# Patient Record
Sex: FEMALE | Race: WHITE | NOT HISPANIC OR LATINO | Employment: OTHER | URBAN - NONMETROPOLITAN AREA
[De-identification: names, ages, dates, MRNs, and addresses within clinical notes are randomized per-mention and may not be internally consistent; named-entity substitution may affect disease eponyms.]

---

## 2017-01-12 ENCOUNTER — OFFICE VISIT (OUTPATIENT)
Dept: NEUROLOGY | Facility: CLINIC | Age: 32
End: 2017-01-12

## 2017-01-12 VITALS
HEIGHT: 66 IN | DIASTOLIC BLOOD PRESSURE: 72 MMHG | OXYGEN SATURATION: 97 % | HEART RATE: 78 BPM | SYSTOLIC BLOOD PRESSURE: 112 MMHG

## 2017-01-12 DIAGNOSIS — E66.01 MORBID OBESITY DUE TO EXCESS CALORIES (HCC): ICD-10-CM

## 2017-01-12 DIAGNOSIS — M51.27 HERNIATED NUCLEUS PULPOSUS, L5-S1: ICD-10-CM

## 2017-01-12 DIAGNOSIS — M54.40 ACUTE LOW BACK PAIN WITH SCIATICA, SCIATICA LATERALITY UNSPECIFIED, UNSPECIFIED BACK PAIN LATERALITY: Primary | ICD-10-CM

## 2017-01-12 PROCEDURE — 99213 OFFICE O/P EST LOW 20 MIN: CPT | Performed by: PSYCHIATRY & NEUROLOGY

## 2017-01-12 RX ORDER — ARIPIPRAZOLE 20 MG/1
20 TABLET ORAL DAILY
COMMUNITY
End: 2017-06-08 | Stop reason: ALTCHOICE

## 2017-01-12 NOTE — MR AVS SNAPSHOT
Vicky Maxwell   1/12/2017 9:00 AM   Office Visit    Dept Phone:  [phone]   Encounter #:  [id number]    Provider:  Mariano Leon MD, FAAN   Department:  [facility] NEUROLOGY                Your Full Care Plan              Today's Medication Changes          These changes are accurate as of: 1/12/17  9:47 AM.  If you have any questions, ask your nurse or doctor.               Medication(s)that have changed:     ABILIFY 20 MG tablet   Generic drug:  ARIPiprazole   Take 20 mg by mouth Daily.   What changed:  Another medication with the same name was removed. Continue taking this medication, and follow the directions you see here.       lamoTRIgine 200 MG tablet   Commonly known as:  LaMICtal   Take 200 mg by mouth 2 (Two) Times a Day. 2 TABS BID   What changed:  Another medication with the same name was removed. Continue taking this medication, and follow the directions you see here.         Stop taking medication(s)listed here:     hydrOXYzine 25 MG tablet   Commonly known as:  ATARAX                      Your Updated Medication List          This list is accurate as of: 1/12/17  9:47 AM.  Always use your most recent med list.                ABILIFY 20 MG tablet   Generic drug:  ARIPiprazole       clonazePAM 0.5 MG tablet   Commonly known as:  KlonoPIN       ibuprofen 800 MG tablet   Commonly known as:  ADVIL,MOTRIN       lamoTRIgine 200 MG tablet   Commonly known as:  LaMICtal       VENTOLIN  (90 BASE) MCG/ACT inhaler   Generic drug:  albuterol               You Were Diagnosed With        Codes Comments    Acute low back pain with sciatica, sciatica laterality unspecified, unspecified back pain laterality    -  Primary ICD-10-CM: M54.40  ICD-9-CM: 724.2, 724.3       Instructions     None    Patient Instructions History      Upcoming Appointments     Visit Type Date Time Department    FOLLOW UP 1/12/2017  9:00 AM [facility] [place] [place]    FOLLOW UP 4/13/2017   "9:30 AM [facility]     Our records indicate that you have declined [facility] sign[facility]. If you would like to sign up for [email or web address], please email [email or web address] or call [phone] to obtain an activation code.             Other Info from Your Visit           Your Appointments     Apr 13, 2017  9:30 AM EDT   Follow Up with Mariano Leon MD, FAAN   [facility] NEUROLOGY (--)    [address], [address]  [facility] [address] [phone]           Arrive 15 minutes prior to appointment.              Allergies     No Known Allergies      Reason for Visit     Follow-up MRI. Dr Leon requested pt be seen sooner to review results      Vital Signs     Blood Pressure Pulse Height Oxygen Saturation Smoking Status       112/72 (BP Location: Right arm, Patient Position: Sitting) 78 66\" (167.6 cm) 97% Never Smoker       Problems and Diagnoses Noted     Acute low back pain with sciatica, sciatica laterality unspecified, unspecified back pain laterality    -  Primary        "

## 2017-01-13 NOTE — PROGRESS NOTES
[facility] NEUROLOGY [place] PROGRESS NOTE  History of Present Illness     Date: 2/19/2017    Patient Identification  Vicky Maxwell is a 32 y.o. female.    Patient information was obtained from patient.  History/Exam limitations: none.    Original consultation requested by: Aria JUÁREZ      Chief Complaint   Follow-up (MRI. Dr Leon requested pt be seen sooner to review results)      History of Present Illness    Vicky is a delightful 32-year-old referred to neurology clinic for evaluation of right foot numbness involving the last 2 digits of her right foot.  Her symptoms has been going on for the last 6 months.   she has tried conservative nonsteroidal anti-inflammatory drugs without significant improvement .    Interval history since last visit , and underwent MRI of the lumbar sacral spine.   I personally reviewed the MRI scan with the patient.   MRI scan show multilevel degenerative changes with significant broad based disc bulging at L5-S1 giving rise to central canal stenosis and bilateral foraminal narrowing .     In today's visit , after reviewing the MRI with the patient we provide opportunity for the patient asked questions we have also discussed about treatment options including physical therapy and neurosurgical intervention .    PMH:   Past Medical History   Diagnosis Date   • Anxiety    • Asthma    • Bipolar disorder    • Depression    • Mitral valve prolapse    • Tattoo        Past Surgical History:   Past Surgical History   Procedure Laterality Date   • Fibula fracture surgery  2014, 2015   • Mandible fracture surgery  2002       Family Hisotry:   Family History   Problem Relation Age of Onset   • Breast cancer Mother    • Colon cancer Neg Hx        Social History:   Social History     Social History   • Marital status:      Spouse name: N/A   • Number of children: N/A   • Years of education: N/A     Occupational History   • Not on file.     Social History Main Topics   • Smoking  status: Never Smoker   • Smokeless tobacco: Never Used   • Alcohol use 0.6 oz/week     1 Glasses of wine per week      Comment: social   • Drug use: No   • Sexual activity: Not on file     Other Topics Concern   • Not on file     Social History Narrative       Medications:   Current Outpatient Prescriptions   Medication Sig Dispense Refill   • ARIPiprazole (ABILIFY) 20 MG tablet Take 20 mg by mouth Daily.     • clonazePAM (KlonoPIN) 0.5 MG tablet TAKE 1 TABLET TWICE A DAY  0   • ibuprofen (ADVIL,MOTRIN) 800 MG tablet 3 (Three) Times a Day As Needed.  1   • lamoTRIgine (LaMICtal) 200 MG tablet Take 200 mg by mouth 2 (Two) Times a Day. 2 TABS BID  1   • VENTOLIN  (90 BASE) MCG/ACT inhaler INHALE 2 PUFF BY INHALATION ROUTE EVERY 4 - 6 HOURS AS NEEDED  0     No current facility-administered medications for this visit.        Allergy: No Known Allergies    Review of Systems:  Review of Systems   Constitutional: Negative for chills and fever.   HENT: Negative for congestion, ear pain, hearing loss, rhinorrhea and sore throat.    Eyes: Negative for pain, discharge and redness.   Respiratory: Negative for cough, shortness of breath, wheezing and stridor.    Cardiovascular: Negative for chest pain, palpitations and leg swelling.   Gastrointestinal: Negative for abdominal pain, constipation, nausea and vomiting.   Endocrine: Negative for cold intolerance, heat intolerance and polyphagia.   Genitourinary: Negative for dysuria, flank pain, frequency and urgency.   Musculoskeletal: Positive for arthralgias, back pain and gait problem. Negative for joint swelling, myalgias, neck pain and neck stiffness.   Skin: Negative for pallor, rash and wound.   Allergic/Immunologic: Negative for environmental allergies.   Neurological: Positive for numbness. Negative for dizziness, tremors, seizures, syncope, facial asymmetry, speech difficulty, weakness, light-headedness and headaches.   Hematological: Negative for adenopathy.  "  Psychiatric/Behavioral: Negative for confusion and hallucinations. The patient is not nervous/anxious.        Physical Exam     Vitals:    01/12/17 0912   BP: 112/72   BP Location: Right arm   Patient Position: Sitting   Pulse: 78   SpO2: 97%   Weight: Comment: scale could not register   Height: 66\" (167.6 cm)     GENERAL: Patient is pleasant, cooperative, appears to be stated age.  Body habitus is endomorphic.  SKIN AND EXTREMITIES:  No skin rashes or lesions are noted.  No cyanosis, clubbing or edema of the extremities.    HEAD:  Head is normocephalic and atraumatic.    NECK: Neck are non-tender without thyromegaly or adenopathy.  Carotic upstrokes are 1+/4.  No cranial or cervical bruits.  The neck is supple with a full range of motion.   ENT: palate elevate symmetrically, no evidence of high arch palate, tongue midline erythema in posterior pharynx, Mallampati Classification Class III   CARDIOVASCULAR:  Regular rate and rhythm with normal S1 and S2 without rub or gallop.  RESPIRATORY:  Clear to auscultation without wheezes or crackle   ABDOMEN:  Soft and non-tender, positive bowel sound without hepatosplenomegaly  BACK:  Back is straight without midline defect.    PSYCH:  Higher cortical function/mental status:  The patient is alert.  She is oriented x3 to time, place and person.  Recent and the remote memory appear normal.  The patient has a good fund of knowledge.  There is no visual or auditory hallucination or suicidal or homicidal ideation.  SPEECH:There is no gross evidence of aphasia, dysarthria or agnosia.      CRANIAL NERVES:  Pupils are 4mm, equal round reactive to light, reacting briskly to 2mm without afferent pupillary defect.  Visual fields are intact to confrontation testing.  Fundoscopic examination reveals sharp disk margins with normal vasculature.  No papilledema, hemorrhages or exudates.  Extraocular movements are full and smooth with normal pursuits and saccades.  No nystagmus noted.  The " face is symmetric. palate elevate symmetrically, Tongue midline, positive gag reflex. The remainder of the cranial nerves are intact and symmetrical.    MOTOR: Strength is 5/5 throughout with normal tone and bulk with the following exceptions, 4/5 intrinsic muscles of the hands and feet.  No involuntary movements noted.    Deep Tendon Reflexes: are 1/4 and symmetrical in the upper extremities, 2/4 and symmetrical at the knees and 0/4 at the Achilles tendon.  Plantar responses were down-going bilaterally.    SENSATION:  Intact to pinprick, light touch, vibration and proprioception.  Decreased to light touch sensation and temperature sensation following S1 dermatome on her right foot.  Coordination:  The patient normally performs finger-nose-finger, heel-to-knee-to-shin and rapid alternating movements in symmetrical fashion.    COORDINATION AND GAIT:  The patient walks with a narrow-based gait.  Patient is able to heel-toe and tandem walk forward and backwards without difficulty.  Romberg and monopedal  Romberg are negative.    MUSCULOSKELETAL: Range of motion normal, no clubbing, cyanosis, or edema.  No joint swelling.            Review of Imaging: I have personally reviewed the following images and discussed with the patient.  MRI scan show multilevel degenerative changes with significant broad based disc bulging at L5-S1 giving rise to central canal stenosis and bilateral foraminal narrowing .     Records Reviewed: I have personally reviewed her previous medical record.    Vicky was seen today for follow-up.    Diagnoses and all orders for this visit:    Acute low back pain with sciatica, sciatica laterality unspecified, unspecified back pain laterality  -     Ambulatory Referral to Physical Therapy    Herniated nucleus pulposus, L5-S1    Morbid obesity due to excess calories      Treatments:  1.  We have discussed about the importance of weight reduction of her low back pain   2.  We have discussed about  [employer] exercises for her low back pain   3.  Patient is not familiar with [employer] exercises we will refer this patient to have physical therapy to evaluate and treat her low back pain   4.  We have discussed about surgical intervention the patient had failed conservative treatment with medication and physical therapy after 6 month trial .  5.  Counseled patient has follow   Chronic low back pain is a common problem in clinical practice. A history and physical examination should place patients into one of several categories: (1) nonspecific low back pain; (2) back pain associated with radiculopathy or spinal stenosis; (3) back pain referred from a nonspinal source; or (4) back pain associated with another specific spinal cause. For patients who have back pain associated with radiculopathy, spinal stenosis, or another specific spinal cause, magnetic resonance imaging or computed tomography may establish the diagnosis and guide management.  Acetaminophen and nonsteroidal anti-inflammatory drugs are first-line medications for chronic low back pain. Tramadol, opioids, and other adjunctive medications may benefit some patients who do not respond to nonsteroidal anti-inflammatory drugs. Acupuncture, exercise therapy, multidisciplinary rehabilitation programs, massage, behavior therapy, and spinal manipulation are effective in certain clinical situations. Patients with radicular symptoms may benefit from epidural steroid injections, but studies have produced mixed results. Most patients with chronic low back pain would not require surgical intervention. A surgical evaluation may be considered for select patients with functional disabilities or refractory pain despite multiple nonsurgical treatments.       This Document is signed by Mariano Leon MD, FAAN, FAASM    January 12, 2017

## 2017-02-08 ENCOUNTER — TREATMENT (OUTPATIENT)
Dept: PHYSICAL THERAPY | Facility: CLINIC | Age: 32
End: 2017-02-08

## 2017-02-08 DIAGNOSIS — M54.40 ACUTE LOW BACK PAIN WITH SCIATICA, SCIATICA LATERALITY UNSPECIFIED, UNSPECIFIED BACK PAIN LATERALITY: Primary | ICD-10-CM

## 2017-02-08 PROCEDURE — 97110 THERAPEUTIC EXERCISES: CPT | Performed by: PHYSICAL THERAPIST

## 2017-02-08 PROCEDURE — 97161 PT EVAL LOW COMPLEX 20 MIN: CPT | Performed by: PHYSICAL THERAPIST

## 2017-02-08 NOTE — PROGRESS NOTES
Physical Therapy Initial Evaluation and Plan of Care      Patient: Vicky Maxwell   : 1985  Diagnosis/ICD-10 Code:  Acute low back pain with sciatica, sciatica laterality unspecified, unspecified back pain laterality [M54.40]  Referring practitioner: Mariano Leon MD, FAAN    Subjective Evaluation    History of Present Illness  Date of onset: 2007  Mechanism of injury: Insidious onset of back pain and then R LE pain 5 years ago.  R LE pain to the toes and top of the foot.     Pain  Current pain ratin  At best pain ratin  At worst pain ratin  Location: R L/S primary area now.  R LE pain after sitting for long periods of time or walking long distances.   Quality: pressure and dull ache  Relieving factors: relaxation, rest and medications (laying on her side.  R side better. )  Aggravating factors: keyboarding (walking and standing)  Progression: worsening    Diagnostic Tests  MRI studies: abnormal (bulging disk)    Patient Goals  Patient goals for therapy: independence with ADLs/IADLs, decreased pain and increased motion  Patient goal: Walk for 30 minutes without increased pain.            Objective     Neurological Testing   Sensation     Lumbar     Right   Diminished: light touch    Comments   Right light touch: R lateral thigh    Active Range of Motion   Cervical/Thoracic Spine     Thoracic   Flexion: WFL and with pain  Extension: WFL and with pain  Left lateral flexion: WFL and with pain  Right lateral flexion: WFL    Lumbar   Flexion: WFL and with pain  Extension: WFL and with pain  Left lateral flexion: WFL and with pain  Right lateral flexion: WFL    Additional Active Range of Motion Details  Segmental limitations secondary to adipose tissue.     R hip flexion limited and groin pain noted passively and actively.     General Comments     Spine Comments  Supine Hooklying 3/10 initially,  Then complete relief for only 30 seconds.   Prone position 0/10 pain initially Then slow elevation  in pain after 3 minutes.     Sitting EOB trunk flexion + relief.          Assessment & Plan     Assessment  Impairments: impaired physical strength, lacks appropriate home exercise program, pain with function and weight-bearing intolerance  Assessment details: Patient is a 32 year old female who comes to physical therapy with chronic back pain. Signs and symptoms are consistent with lumbar strain/sprain resulting in pain, decreased ROM, decreased strength, and inability to perform all essential functional activities. Pt has some limitations secondary to obesity. Pt will benefit from skilled PT services to address the above issues.     Barriers to therapy: Obesity  Prognosis: fair  Prognosis details:      SHORT TERM GOALS:     2 weeks  1. Pt independent with HEP  2. Pt to demonstrate trunk AROM 50-75% of expected norms to allow for improved ability to perform ADL's  3. Pt to demonstrate bilateral hip strength 4/5 in all planes to improved stability of the core/trunk     LONG TERM GOALS:   6 weeks  1. Pt to demonstrate trunk AROM % of expected norms to allow for improved ability to perform functional activities  2. Pt to demonstrate ability to perform full functional squat with good form and without increased pain in the low back   3. Pt to report being able to work full shift or work in the home without increase in pain in the back  4. Pt to report cessation of pain/numbness/tingling into the right leg to show decreased nerve compression      Goals  Walk for 30 minutes without increased pain.     Plan  Therapy options: will be seen for skilled physical therapy services  Planned modality interventions: thermotherapy (hydrocollator packs) and cryotherapy  Planned therapy interventions: abdominal trunk stabilization, body mechanics training, flexibility, functional ROM exercises, home exercise program, joint mobilization, therapeutic activities, stretching, strengthening, spinal/joint mobilization and manual  therapy  Frequency: 1x week  Duration in weeks: 6  Treatment plan discussed with: patient        Manual Therapy:    0     mins  98151;  Therapeutic Exercise:    13     mins  95879;     Neuromuscular Tico:        mins  82992;    Therapeutic Activity:     3     mins  32952;     Gait Training:           mins  89725;     Ultrasound:          mins  33495;    Electrical Stimulation:         mins  84016 ( );  Dry Needling          mins self-pay    Timed Treatment:   16   mins   Total Treatment:     50   mins    PT SIGNATURE: Huber Camacho, PT   DATE TREATMENT INITIATED: 2/8/2017    Initial Certification  Certification Period: 5/9/2017  I certify that the therapy services are furnished while this patient is under my care.  The services outlined above are required by this patient, and will be reviewed every 90 days.     PHYSICIAN: Mariano Leon MD, FAAN      DATE:     Please sign and return via fax to  .. Thank you, [facility] Physical Therapy.

## 2017-02-15 ENCOUNTER — TREATMENT (OUTPATIENT)
Dept: PHYSICAL THERAPY | Facility: CLINIC | Age: 32
End: 2017-02-15

## 2017-02-15 DIAGNOSIS — M54.40 ACUTE LOW BACK PAIN WITH SCIATICA, SCIATICA LATERALITY UNSPECIFIED, UNSPECIFIED BACK PAIN LATERALITY: Primary | ICD-10-CM

## 2017-02-15 PROCEDURE — 97110 THERAPEUTIC EXERCISES: CPT | Performed by: PHYSICAL THERAPIST

## 2017-02-15 PROCEDURE — 97140 MANUAL THERAPY 1/> REGIONS: CPT | Performed by: PHYSICAL THERAPIST

## 2017-02-15 NOTE — PROGRESS NOTES
Physical Therapy Daily Progress Note        Vicky Hans reports: Pt reports feeling about the same.  Pain is in the same location. 6/10. Pt reports trunk flexion in sitting seems to help a little.          Subjective     Objective   Pt with no obvious distress at rest.  Pt able to reproduce sitting trunk flexion exercise with only minimal assistance.     Prone over T-ball 0/10 pain during activity.     Post PT 2/10 pain.  See Exercise, Manual, and Modality Logs for complete treatment.       Assessment/Plan  Pt with some relief from PT activity today.  Pts pain is fairly localized in the R Upper L/S today.     Progress per Plan of Care           Manual Therapy:    9     mins  21784;  Therapeutic Exercise:    44     mins  10797;     Neuromuscular Tico:        mins  27662;    Therapeutic Activity:          mins  51815;     Gait Training:           mins  33766;     Ultrasound:          mins  65874;    Electrical Stimulation:         mins  20351 ( );  Dry Needling          mins self-pay    Timed Treatment:   53   mins   Total Treatment:     56   mins    Huber Camacho, PT  Physical Therapist

## 2017-06-08 ENCOUNTER — OFFICE VISIT (OUTPATIENT)
Dept: NEUROLOGY | Facility: CLINIC | Age: 32
End: 2017-06-08

## 2017-06-08 VITALS
HEIGHT: 66 IN | SYSTOLIC BLOOD PRESSURE: 128 MMHG | DIASTOLIC BLOOD PRESSURE: 80 MMHG | OXYGEN SATURATION: 97 % | HEART RATE: 110 BPM | BODY MASS INDEX: 47.09 KG/M2 | WEIGHT: 293 LBS

## 2017-06-08 DIAGNOSIS — G89.29 CHRONIC LOW BACK PAIN WITH SCIATICA, SCIATICA LATERALITY UNSPECIFIED, UNSPECIFIED BACK PAIN LATERALITY: Primary | ICD-10-CM

## 2017-06-08 DIAGNOSIS — G57.11 MERALGIA PARAESTHETICA, RIGHT: ICD-10-CM

## 2017-06-08 DIAGNOSIS — M54.40 CHRONIC LOW BACK PAIN WITH SCIATICA, SCIATICA LATERALITY UNSPECIFIED, UNSPECIFIED BACK PAIN LATERALITY: Primary | ICD-10-CM

## 2017-06-08 PROCEDURE — 99213 OFFICE O/P EST LOW 20 MIN: CPT | Performed by: PSYCHIATRY & NEUROLOGY

## 2017-06-08 RX ORDER — RISPERIDONE 4 MG/1
1 TABLET ORAL DAILY
Refills: 1 | COMMUNITY
Start: 2017-05-17 | End: 2017-07-21

## 2017-06-08 RX ORDER — BUPROPION HYDROCHLORIDE 150 MG/1
300 TABLET ORAL
Refills: 0 | COMMUNITY
Start: 2017-05-17 | End: 2017-11-20 | Stop reason: DRUGHIGH

## 2017-06-08 NOTE — PROGRESS NOTES
[facility] NEUROLOGY [place] PROGRESS NOTE  History of Present Illness     Date: 6/8/2017    Patient Identification  Vicky Maxwell is a 32 y.o. female.    Patient information was obtained from patient.  History/Exam limitations: none.    Original consultation requested by: Aria JUÁREZ      Chief Complaint   Back Pain (After MRI pt was referred to PT. Pt states she had not been able to go to PT due to losing insurance, has been doing what she learned at home) and Numbness (Pt c/o numbness in Right thigh )      History of Present Illness   Patient is a pleasant 32-year-old lady presented office for follow-up.  Patient persistent back pain.  She underwent physical therapy for short period of time but when her insurance ran out she lost her physical therapist.  She is now complaining of having numbness radiating down to her right thigh but not yet on her right knee following the lateral femoral cutaneous nerve distribution.  Also reports to have difficulty with sleeping at nighttime because of the persistent pain.    PMH:   Past Medical History:   Diagnosis Date   • Anxiety    • Asthma    • Bipolar disorder    • Depression    • Mitral valve prolapse    • Tattoo        Past Surgical History:   Past Surgical History:   Procedure Laterality Date   • FIBULA FRACTURE SURGERY  2014, 2015   • MANDIBLE FRACTURE SURGERY  2002       Family Hisotry:   Family History   Problem Relation Age of Onset   • Breast cancer Mother    • Colon cancer Neg Hx        Social History:   Social History     Social History   • Marital status:      Spouse name: N/A   • Number of children: N/A   • Years of education: N/A     Occupational History   • Not on file.     Social History Main Topics   • Smoking status: Never Smoker   • Smokeless tobacco: Never Used   • Alcohol use 0.6 oz/week     1 Glasses of wine per week      Comment: social   • Drug use: No   • Sexual activity: Not on file     Other Topics Concern   • Not on file      Social History Narrative       Medications:   Current Outpatient Prescriptions   Medication Sig Dispense Refill   • buPROPion XL (WELLBUTRIN XL) 150 MG 24 hr tablet 300 mg.  0   • clonazePAM (KlonoPIN) 0.5 MG tablet TAKE 1 TABLET TWICE A DAY  0   • ibuprofen (ADVIL,MOTRIN) 800 MG tablet 3 (Three) Times a Day As Needed.  1   • lamoTRIgine (LaMICtal) 200 MG tablet Take 200 mg by mouth 2 (Two) Times a Day. 2 TABS BID  1   • risperiDONE (risperDAL) 4 MG tablet 1 tablet Daily.    1   • VENTOLIN  (90 BASE) MCG/ACT inhaler INHALE 2 PUFF BY INHALATION ROUTE EVERY 4 - 6 HOURS AS NEEDED  0     No current facility-administered medications for this visit.        Allergy: No Known Allergies    Review of Systems:  Review of Systems   Constitutional: Negative for chills and fever.   HENT: Negative for congestion, ear pain, hearing loss, rhinorrhea and sore throat.    Eyes: Negative for pain, discharge and redness.   Respiratory: Negative for cough, shortness of breath, wheezing and stridor.    Cardiovascular: Negative for chest pain, palpitations and leg swelling.   Gastrointestinal: Negative for abdominal pain, constipation, nausea and vomiting.   Endocrine: Negative for cold intolerance, heat intolerance and polyphagia.   Genitourinary: Negative for dysuria, flank pain, frequency and urgency.   Musculoskeletal: Positive for back pain and gait problem. Negative for joint swelling, myalgias, neck pain and neck stiffness.   Skin: Negative for pallor, rash and wound.   Allergic/Immunologic: Negative for environmental allergies.   Neurological: Positive for weakness and numbness. Negative for dizziness, tremors, seizures, syncope, facial asymmetry, speech difficulty, light-headedness and headaches.   Hematological: Negative for adenopathy.   Psychiatric/Behavioral: Positive for sleep disturbance. Negative for confusion and hallucinations. The patient is not nervous/anxious.        Physical Exam     Vitals:    06/08/17 1051  "  BP: 128/80   Pulse: 110   SpO2: 97%   Weight: (!) 400 lb 2 oz (181 kg)   Height: 66\" (167.6 cm)     GENERAL: Patient is pleasant, cooperative, appears to be stated age.  Body habitus is endomorphic.  SKIN AND EXTREMITIES:  No skin rashes or lesions are noted.  No cyanosis, clubbing or edema of the extremities.    HEAD:  Head is normocephalic and atraumatic.    NECK: Neck are non-tender without thyromegaly or adenopathy.  Carotic upstrokes are 1+/4.  No cranial or cervical bruits.  The neck is supple with a full range of motion.   ENT: palate elevate symmetrically, no evidence of high arch palate, tongue midline erythema in posterior pharynx, Mallampati Classification Class III   CARDIOVASCULAR:  Regular rate and rhythm with normal S1 and S2 without rub or gallop.  RESPIRATORY:  Clear to auscultation without wheezes or crackle   ABDOMEN:  Soft and non-tender, positive bowel sound without hepatosplenomegaly  BACK:  Back is straight without midline defect.    PSYCH:  Higher cortical function/mental status:  The patient is alert.  She is oriented x3 to time, place and person.  Recent and the remote memory appear normal.  The patient has a good fund of knowledge.  There is no visual or auditory hallucination or suicidal or homicidal ideation.  SPEECH:There is no gross evidence of aphasia, dysarthria or agnosia.      CRANIAL NERVES:  Pupils are 4mm, equal round reactive to light, reacting briskly to 2mm without afferent pupillary defect.  Visual fields are intact to confrontation testing.  Fundoscopic examination reveals sharp disk margins with normal vasculature.  No papilledema, hemorrhages or exudates.  Extraocular movements are full and smooth with normal pursuits and saccades.  No nystagmus noted.  The face is symmetric. palate elevate symmetrically, Tongue midline, positive gag reflex. The remainder of the cranial nerves are intact and symmetrical.    MOTOR: Strength is 5/5 throughout with normal tone and bulk " with the following exceptions, 4/5 intrinsic muscles of the hands and feet.  No involuntary movements noted.    Deep Tendon Reflexes: are 2/4 and symmetrical in the upper extremities, 2/4 and symmetrical at the knees and 1/4 and symmetrical at the Achilles tendon.  Plantar responses were down-going bilaterally.    SENSATION:  Intact to pinprick, light touch, vibration and proprioception.  Coordination:  The patient normally performs finger-nose-finger, heel-to-knee-to-shin and rapid alternating movements in symmetrical fashion.    COORDINATION AND GAIT:  The patient walks with a narrow-based gait.  Patient is able to heel-toe and tandem walk forward and backwards without difficulty.  Romberg and monopedal  Romberg are negative.    MUSCULOSKELETAL: Range of motion normal, no clubbing, cyanosis, or edema.  No joint swelling.            Studies: I have personally reviewed the following and discussed with the patient.  Results for orders placed or performed during the hospital encounter of 10/17/16   Sedimentation Rate   Result Value Ref Range    Sed Rate 20 0 - 20 mm/hr   Antinuclear Antibody With Reflex Cascade   Result Value Ref Range    COLLEEN Negative Negative    COLLEEN Comment .       Records Reviewed: I have personally reviewed her previous medical record.    Vicky was seen today for back pain and numbness.    Diagnoses and all orders for this visit:    Chronic low back pain with sciatica, sciatica laterality unspecified, unspecified back pain laterality  -     Inject Trigger Points, > 3    Meralgia paraesthetica, right        Treatments:  1.  We'll schedule patient to have a follow-up visit for trigger point injection  2.  We'll try to see if we can make another arrangement for her to see physical therapist.  3.  Continue muscle relaxant  4.  Continue anti-inflammatory medication  5.  Stretching exercise  6 counseled patient low back pain is follow  Chronic low back pain is a common problem in clinical practice. A  history and physical examination should place patients into one of several categories: (1) nonspecific low back pain; (2) back pain associated with radiculopathy or spinal stenosis; (3) back pain referred from a nonspinal source; or (4) back pain associated with another specific spinal cause. For patients who have back pain associated with radiculopathy, spinal stenosis, or another specific spinal cause, magnetic resonance imaging or computed tomography may establish the diagnosis and guide management.  Acetaminophen and nonsteroidal anti-inflammatory drugs are first-line medications for chronic low back pain. Tramadol, opioids, and other adjunctive medications may benefit some patients who do not respond to nonsteroidal anti-inflammatory drugs. Acupuncture, exercise therapy, multidisciplinary rehabilitation programs, massage, behavior therapy, and spinal manipulation are effective in certain clinical situations. Patients with radicular symptoms may benefit from epidural steroid injections, but studies have produced mixed results. Most patients with chronic low back pain would not require surgical intervention. A surgical evaluation may be considered for select patients with functional disabilities or refractory pain despite multiple nonsurgical treatments.   .    This Document is signed by Mariano Leon MD, FAAN, FAASM   June 8, 20179:48 PM

## 2017-07-21 ENCOUNTER — OFFICE VISIT (OUTPATIENT)
Dept: OBSTETRICS AND GYNECOLOGY | Facility: CLINIC | Age: 32
End: 2017-07-21

## 2017-07-21 VITALS
DIASTOLIC BLOOD PRESSURE: 70 MMHG | HEIGHT: 65 IN | BODY MASS INDEX: 48.82 KG/M2 | WEIGHT: 293 LBS | SYSTOLIC BLOOD PRESSURE: 132 MMHG

## 2017-07-21 DIAGNOSIS — N92.6 IRREGULAR MENSES: Primary | ICD-10-CM

## 2017-07-21 PROCEDURE — 99203 OFFICE O/P NEW LOW 30 MIN: CPT | Performed by: OBSTETRICS & GYNECOLOGY

## 2017-07-21 RX ORDER — ARIPIPRAZOLE 30 MG/1
TABLET ORAL DAILY
COMMUNITY
Start: 2017-07-19 | End: 2020-01-23

## 2017-07-21 NOTE — PATIENT INSTRUCTIONS
Abnormal Uterine Bleeding  Abnormal uterine bleeding means bleeding from the vagina that is not your normal menstrual period. This can be:  · Bleeding or spotting between periods.  · Bleeding after sex (sexual intercourse).  · Bleeding that is heavier or more than normal.  · Periods that last longer than usual.  · Bleeding after menopause.  There are many problems that may cause this. Treatment will depend on the cause of the bleeding. Any kind of bleeding that is not normal should be reviewed by your doctor.   HOME CARE  Watch your condition for any changes. These actions may lessen any discomfort you are having:  · Do not use tampons or douches as told by your doctor.  · Change your pads often.  You should get regular pelvic exams and Pap tests. Keep all appointments for tests as told by your doctor.  GET HELP IF:  · You are bleeding for more than 1 week.  · You feel dizzy at times.  GET HELP RIGHT AWAY IF:   · You pass out.  · You have to change pads every 15 to 30 minutes.  · You have belly pain.  · You have a fever.  · You become sweaty or weak.  · You are passing large blood clots from the vagina.  · You feel sick to your stomach (nauseous) and throw up (vomit).  MAKE SURE YOU:  · Understand these instructions.  · Will watch your condition.  · Will get help right away if you are not doing well or get worse.     This information is not intended to replace advice given to you by your health care provider. Make sure you discuss any questions you have with your health care provider.     Document Released: 10/15/2010 Document Revised: 12/23/2014 Document Reviewed: 07/17/2014  [employer] Patient Education ©2017 [employer]

## 2017-07-21 NOTE — PROGRESS NOTES
Subjective   Chief Complaint   Patient presents with   • Polycystic Ovary Syndrome      NEW PT   having alot of S&S of pcos   • Amenorrhea     2 months with no periods, [facility]     Vicky Maxwell is a 32 y.o. year old .  Patient's last menstrual period was 2017.  She presents to be seen because of amnoreha for 2m oths-was on REsperdal. Period usually regular proir to starting Resperdal--has been taken off of it. Sexually active--no OCP   LABs WNL-no DM, normal Thyroid  On psychotrpoics since --stefanyonw lsight elevation of PRL.--Anxiety/Dpressoin/Auditory hallucination--Mariano Walker MD    OTHER COMPLAINTS:  Nothing else    The following portions of the patient's history were reviewed and updated as appropriate:  She  has a past medical history of Anxiety; Anxiety; Asthma; Bipolar disorder; Depression; Mitral valve prolapse; and Tattoo.  She  does not have a problem list on file.  She  has a past surgical history that includes Fibula Fracture Surgery (, ) and Mandible fracture surgery ().  Her family history includes Breast cancer in her mother; Diabetes in her maternal grandfather. There is no history of Colon cancer.  She  reports that she has never smoked. She has never used smokeless tobacco. She reports that she drinks about 0.6 oz of alcohol per week  She reports that she does not use illicit drugs.  Current Outpatient Prescriptions   Medication Sig Dispense Refill   • ARIPiprazole (ABILIFY) 30 MG tablet      • buPROPion XL (WELLBUTRIN XL) 150 MG 24 hr tablet 300 mg.  0   • clonazePAM (KlonoPIN) 0.5 MG tablet TAKE 1 TABLET TWICE A DAY  0   • ibuprofen (ADVIL,MOTRIN) 800 MG tablet 3 (Three) Times a Day As Needed.  1   • lamoTRIgine (LaMICtal) 200 MG tablet Take 200 mg by mouth 2 (Two) Times a Day. 2 TABS BID  1   • VENTOLIN  (90 BASE) MCG/ACT inhaler INHALE 2 PUFF BY INHALATION ROUTE EVERY 4 - 6 HOURS AS NEEDED  0     No current  "facility-administered medications for this visit.      Current Outpatient Prescriptions on File Prior to Visit   Medication Sig   • buPROPion XL (WELLBUTRIN XL) 150 MG 24 hr tablet 300 mg.   • clonazePAM (KlonoPIN) 0.5 MG tablet TAKE 1 TABLET TWICE A DAY   • ibuprofen (ADVIL,MOTRIN) 800 MG tablet 3 (Three) Times a Day As Needed.   • lamoTRIgine (LaMICtal) 200 MG tablet Take 200 mg by mouth 2 (Two) Times a Day. 2 TABS BID   • VENTOLIN  (90 BASE) MCG/ACT inhaler INHALE 2 PUFF BY INHALATION ROUTE EVERY 4 - 6 HOURS AS NEEDED   • [DISCONTINUED] risperiDONE (risperDAL) 4 MG tablet 1 tablet Daily.       No current facility-administered medications on file prior to visit.      She has No Known Allergies.    Smoking status: Never Smoker                                                              Smokeless status: Never Used                        Review of Systems  Consitutional POS: nothing reported    NEG: anorexia or night sweats   Gastointestinal POS: nothing reported    NEG: bloating, change in bowel habits, melena or reflux symptoms   Genitourinary POS: nothing reported    NEG: dysuria or hematuria   Integument POS: nothing reported    NEG: moles that are changing in size, shape, color or rashes   Breast POS: nothing reported    NEG: persistent breast lump, skin dimpling or nipple discharge         Respiratory: negative  Cardiovascular: negative  Musculoskeletal:negative  Neurological: negative  Behavioral/Psych: positive for anxiety and depression          Objective   /70  Ht 65\" (165.1 cm)  Wt (!) 401 lb (182 kg)  LMP 05/01/2017  BMI 66.73 kg/m2    General:  well developed; well nourished  no acute distress  obese - Body mass index is 66.73 kg/(m^2).   Skin:  No suspicious lesions seen   Thyroid: normal to inspection and palpation   Lungs:  breathing is unlabored   Heart:  regular rate and rhythm, S1, S2 normal, no murmur, click, rub or gallop   Breasts:  Not performed.   Abdomen: soft, non-tender; " no masses  no umbilical or inginual hernias are present  no hepato-splenomegaly  Abnormal findings: morbid obesity   Pelvis: Not performed.     Lab Review   No data reviewed    Imaging   No data reviewed        Assessment   1. Irregular menstruation.  Patient is a long history of psychotropic use most recent prolactin was elevated over 100 though she denies galactorrhea.  She has super morbid obesity.  And infertility for 5+ years.  She is an open relationship with 2 partners.  1 of which has had a child.  She does use marijuana on occasion as well.  However preceding these last 2 skies cycles that she skipped she states her periods were regular and she had moliminal symptoms.     Plan   1. PCO labs have been ordered.  TVS in 3 weeks.       This note was electronically signed.      July 21, 2017

## 2017-07-22 LAB
DHEA-S SERPL-MCNC: 419.8 UG/DL (ref 84.8–378)
HBA1C MFR BLD: 5.3 %
PROLACTIN SERPL-MCNC: 26.8 NG/ML (ref 4.8–23.3)
TESTOST FREE SERPL-MCNC: 4 PG/ML (ref 0–4.2)

## 2017-07-26 ENCOUNTER — TRANSCRIBE ORDERS (OUTPATIENT)
Dept: MAMMOGRAPHY | Facility: HOSPITAL | Age: 32
End: 2017-07-26

## 2017-07-26 DIAGNOSIS — Z13.9 SCREENING: Primary | ICD-10-CM

## 2017-08-07 ENCOUNTER — PROCEDURE VISIT (OUTPATIENT)
Dept: NEUROLOGY | Facility: CLINIC | Age: 32
End: 2017-08-07

## 2017-08-07 VITALS
SYSTOLIC BLOOD PRESSURE: 128 MMHG | DIASTOLIC BLOOD PRESSURE: 70 MMHG | HEIGHT: 65 IN | BODY MASS INDEX: 48.82 KG/M2 | WEIGHT: 293 LBS | OXYGEN SATURATION: 95 % | HEART RATE: 101 BPM

## 2017-08-07 DIAGNOSIS — M54.40 BILATERAL LOW BACK PAIN WITH SCIATICA, SCIATICA LATERALITY UNSPECIFIED, UNSPECIFIED CHRONICITY: Primary | ICD-10-CM

## 2017-08-07 DIAGNOSIS — M79.18 MYOFASCIAL MUSCLE PAIN: ICD-10-CM

## 2017-08-07 PROCEDURE — 20553 NJX 1/MLT TRIGGER POINTS 3/>: CPT | Performed by: PSYCHIATRY & NEUROLOGY

## 2017-08-07 RX ORDER — METHYLPREDNISOLONE ACETATE 40 MG/ML
200 INJECTION, SUSPENSION INTRA-ARTICULAR; INTRALESIONAL; INTRAMUSCULAR; SOFT TISSUE ONCE
Status: COMPLETED | OUTPATIENT
Start: 2017-08-07 | End: 2017-08-07

## 2017-08-07 RX ORDER — BUPIVACAINE HYDROCHLORIDE 7.5 MG/ML
5 INJECTION, SOLUTION EPIDURAL; RETROBULBAR ONCE
Status: DISCONTINUED | OUTPATIENT
Start: 2017-08-07 | End: 2019-08-28

## 2017-08-07 RX ADMIN — METHYLPREDNISOLONE ACETATE 200 MG: 40 INJECTION, SUSPENSION INTRA-ARTICULAR; INTRALESIONAL; INTRAMUSCULAR; SOFT TISSUE at 19:30

## 2017-08-07 NOTE — PROGRESS NOTES
Procedure note    Procedure: Trigger point injection    Indication: Persistent low back pain and myofascial muscle pain.   patient underwent physical therapy for duration of time but her insurance ran out she lost her physical therapist.  Patient has tried ibuprofen without any benefit.  Patient subsequently presented here for trigger point injection.    Procedure note  Patient is suffering from headache and myofacial pain syndrome. Risks and benefits of the Trigger point injection procedure have been explained to the patient.  Patient has no contraindications such as bleed diathesis, recent acute trauma at the muscle sites, anesthetic allergy or anticoagulation.  Mechanism of trigger point injection has been explained to patient.     Procedure Note:  1.         Patient was positioned comfortably  2.         Before injections are started, 10 Trigger Points sites are identified throughout the bilateral upper trapezius muscle, levator scapulae, and erector spinae muscles.    3.         Overlying skin area was cleaned with Alcohol swab                                                                                                              4.         Before injection, trigger points sites were palpated for local twitch and referred pain to confirms placement                         5.         Local anesthetic was mixed with Depo-Medrol (5 cc of Marcaine 0.5% mixed with 5 cc of Depo-Medrol at 40 mg/ml - for a total of 10 cc)  6.         30 gauge ½ inch needle was utilized to ensure patient comfort          7.         I started with the most tender spot in above mentioned Trigger Points   1.   Localize most tender spot within taut muscle-fibers  2.   Fix tender spot between fingers (1-2 cm in size)   1.   Prevents from rolling away from needle  2.   Controls subcutaneous bleeding  3.   Before injection, patient was instructed of possible pain on injection  4.   Direct needle at 30 degree angle off skin   8.          Insert needle into skin 1-2 cm from Trigger Point   9.         Advance needle into Trigger Point   10.       Use 1cc anesthetic at each Trigger Points identified in step #2  11.       Redirect needle and reinject                                                                                              1.   Withdraw needle to subcutaneous tissue  2.   Redirect needle into adjacent tender areas  3.   Repeat until local twitch or tautness resolves  12.   After each of the 10 injections I held direct pressure at injection site for 2 minutes to prevents hematoma formation  13.   The same procedure was repeated for other paraspinous muscles in lumbar spine at L1 to L5 bilaterally  14.   Patient was instructed to gently stretch injected areas to full active range of motion in all directions and was instructed to repeat range of motion three times after injection  15.   Post-Procedure patient was instructed to avoid over-using injected area for 3-4 days   1.   Maintain active range of motion of injected muscle  2.   Patient applies ice to injected areas for a few hours  3.   Anticipate post-injection soreness for 3-4 days  There was no evidence of complications from injection was noted such as local Skin Infection  at injection site or local hematoma at injection site      Marcaine lot #[id number]  Expiration date January 1, 2019    Depo-Medrol lots number [id number]  Expiration date October 2019    Mariano Leon MD, FAAN  08/07/2017

## 2017-08-11 ENCOUNTER — HOSPITAL ENCOUNTER (OUTPATIENT)
Dept: MAMMOGRAPHY | Facility: HOSPITAL | Age: 32
Discharge: HOME | End: 2017-08-11

## 2017-08-11 DIAGNOSIS — Z13.9 SCREENING: ICD-10-CM

## 2017-10-17 ENCOUNTER — OFFICE VISIT (OUTPATIENT)
Dept: OBSTETRICS AND GYNECOLOGY | Facility: CLINIC | Age: 32
End: 2017-10-17

## 2017-10-17 VITALS
DIASTOLIC BLOOD PRESSURE: 70 MMHG | BODY MASS INDEX: 48.82 KG/M2 | HEIGHT: 65 IN | SYSTOLIC BLOOD PRESSURE: 141 MMHG | WEIGHT: 293 LBS

## 2017-10-17 DIAGNOSIS — Z31.69 INFERTILITY COUNSELING: Primary | ICD-10-CM

## 2017-10-17 PROCEDURE — 99213 OFFICE O/P EST LOW 20 MIN: CPT | Performed by: OBSTETRICS & GYNECOLOGY

## 2017-10-17 NOTE — PROGRESS NOTES
Subjective   Chief Complaint   Patient presents with   • Follow-up     TVS- irregular menses, infertility     Vicky Maxwell is a 32 y.o. year old .  No LMP recorded.  She presents to be seen because of AUB and infert. Labs shows mildly elevated DHEAS, PRL 26--dpown from over 100 since adjusting psychotropics. Since being seen has had 2 periods. TVS today WNL, ET 8mm, no masses, no FF    OTHER COMPLAINTS:  Nothing else    The following portions of the patient's history were reviewed and updated as appropriate:  She  has a past medical history of Anxiety; Anxiety; Asthma; Bipolar disorder; Depression; Mitral valve prolapse; and Tattoo.  She  does not have a problem list on file.  She  has a past surgical history that includes Fibula Fracture Surgery (, ) and Mandible fracture surgery ().  Her family history includes Breast cancer in her mother; Diabetes in her maternal grandfather. There is no history of Colon cancer.  She  reports that she has never smoked. She has never used smokeless tobacco. She reports that she drinks about 0.6 oz of alcohol per week  She reports that she does not use illicit drugs.  Current Outpatient Prescriptions   Medication Sig Dispense Refill   • ARIPiprazole (ABILIFY) 30 MG tablet      • buPROPion XL (WELLBUTRIN XL) 150 MG 24 hr tablet 300 mg.  0   • clonazePAM (KlonoPIN) 0.5 MG tablet TAKE 1 TABLET TWICE A DAY  0   • ibuprofen (ADVIL,MOTRIN) 800 MG tablet 3 (Three) Times a Day As Needed.  1   • lamoTRIgine (LaMICtal) 200 MG tablet Take 200 mg by mouth 2 (Two) Times a Day. 2 TABS BID  1   • VENTOLIN  (90 BASE) MCG/ACT inhaler INHALE 2 PUFF BY INHALATION ROUTE EVERY 4 - 6 HOURS AS NEEDED  0     Current Facility-Administered Medications   Medication Dose Route Frequency Provider Last Rate Last Dose   • bupivacaine PF (MARCAINE) 0.75 % injection 37.5 mg  5 mL Injection Once Mariano Leon MD, FAAN         Current Outpatient Prescriptions on File Prior to Visit  "  Medication Sig   • ARIPiprazole (ABILIFY) 30 MG tablet    • buPROPion XL (WELLBUTRIN XL) 150 MG 24 hr tablet 300 mg.   • clonazePAM (KlonoPIN) 0.5 MG tablet TAKE 1 TABLET TWICE A DAY   • ibuprofen (ADVIL,MOTRIN) 800 MG tablet 3 (Three) Times a Day As Needed.   • lamoTRIgine (LaMICtal) 200 MG tablet Take 200 mg by mouth 2 (Two) Times a Day. 2 TABS BID   • VENTOLIN  (90 BASE) MCG/ACT inhaler INHALE 2 PUFF BY INHALATION ROUTE EVERY 4 - 6 HOURS AS NEEDED     Current Facility-Administered Medications on File Prior to Visit   Medication   • bupivacaine PF (MARCAINE) 0.75 % injection 37.5 mg     She has No Known Allergies.    Smoking status: Never Smoker                                                              Smokeless status: Never Used                        Review of Systems  Consitutional POS: nothing reported    NEG: anorexia or night sweats   Gastointestinal POS: nothing reported    NEG: bloating, change in bowel habits, melena or reflux symptoms   Genitourinary POS: nothing reported    NEG: dysuria or hematuria   Integument POS: nothing reported    NEG: moles that are changing in size, shape, color or rashes   Breast POS: nothing reported    NEG: persistent breast lump, skin dimpling or nipple discharge         Pertinent items are noted in HPI.          Objective   /70  Ht 65\" (165.1 cm)  Wt (!) 400 lb (181 kg)  BMI 66.56 kg/m2    General:  well developed; well nourished  no acute distress   Skin:  No suspicious lesions seen   Thyroid: normal to inspection and palpation   Lungs:  breathing is unlabored  clear to auscultation bilaterally   Heart:  regular rate and rhythm, S1, S2 normal, no murmur, click, rub or gallop   Breasts:  Not performed.   Abdomen: Not performed.   Pelvis: Not performed.     Psychiatric: Alert and oriented ×3, mood and affect appropriate  HEENT: Atraumatic, normocephalic, normal scleral icterus  Extremities: 2+ pulses bilaterally, no edema      Lab Review   CMP, PRL and " TSH    Imaging   Pelvic ultrasound images independantly reviewed - as above        Assessment   1. Infertilty x 5     Plan   1. Semen analysis. Timed intercourse. OPK's  2. Day 21 progsterone if OPK's not success    No orders of the defined types were placed in this encounter.         This note was electronically signed.      October 17, 2017

## 2017-11-20 ENCOUNTER — PROCEDURE VISIT (OUTPATIENT)
Dept: NEUROLOGY | Facility: CLINIC | Age: 32
End: 2017-11-20

## 2017-11-20 VITALS
DIASTOLIC BLOOD PRESSURE: 68 MMHG | HEART RATE: 94 BPM | SYSTOLIC BLOOD PRESSURE: 126 MMHG | OXYGEN SATURATION: 96 % | HEIGHT: 65 IN | WEIGHT: 293 LBS | BODY MASS INDEX: 48.82 KG/M2

## 2017-11-20 DIAGNOSIS — M54.40 MIDLINE LOW BACK PAIN WITH SCIATICA, SCIATICA LATERALITY UNSPECIFIED, UNSPECIFIED CHRONICITY: Primary | ICD-10-CM

## 2017-11-20 DIAGNOSIS — M79.18 MYOFASCIAL MUSCLE PAIN: ICD-10-CM

## 2017-11-20 DIAGNOSIS — R26.9 GAIT DIFFICULTY: ICD-10-CM

## 2017-11-20 PROCEDURE — 20553 NJX 1/MLT TRIGGER POINTS 3/>: CPT | Performed by: PSYCHIATRY & NEUROLOGY

## 2017-11-20 RX ORDER — METHYLPREDNISOLONE ACETATE 40 MG/ML
200 INJECTION, SUSPENSION INTRA-ARTICULAR; INTRALESIONAL; INTRAMUSCULAR; SOFT TISSUE ONCE
Status: COMPLETED | OUTPATIENT
Start: 2017-11-20 | End: 2017-11-20

## 2017-11-20 RX ORDER — DICLOFENAC SODIUM 75 MG/1
1 TABLET, DELAYED RELEASE ORAL 2 TIMES DAILY
Refills: 0 | COMMUNITY
Start: 2017-11-13 | End: 2018-02-26

## 2017-11-20 RX ORDER — BUPROPION HYDROCHLORIDE 300 MG/1
1 TABLET ORAL DAILY
Refills: 2 | COMMUNITY
Start: 2017-10-04 | End: 2019-10-11

## 2017-11-20 RX ORDER — BUPIVACAINE HYDROCHLORIDE 7.5 MG/ML
5 INJECTION, SOLUTION EPIDURAL; RETROBULBAR ONCE
Status: DISCONTINUED | OUTPATIENT
Start: 2017-11-20 | End: 2019-08-28

## 2017-11-20 RX ADMIN — METHYLPREDNISOLONE ACETATE 200 MG: 40 INJECTION, SUSPENSION INTRA-ARTICULAR; INTRALESIONAL; INTRAMUSCULAR; SOFT TISSUE at 10:20

## 2017-11-20 NOTE — PROGRESS NOTES
Procedure note    Procedure: Trigger point injection    Indication: Patient has persistent bilateral low back pain.  Patient underwent physical therapy, patient has tried ibuprofen without significant improvement.  Patient benefited from trigger point injection in last visit.  Patient reported that the benefit wear off a week prior to this injection.  She had difficulty even tried to get off from the chair.    Procedure note  Patient is suffering from low back pain and myofacial pain syndrome. Risks and benefits of the Trigger point injection procedure have been explained to the patient.  Patient has no contraindications such as bleed diathesis, recent acute trauma at the muscle sites, anesthetic allergy or anticoagulation.  Mechanism of trigger point injection has been explained to patient.     Procedure Note:  1.         Patient was positioned comfortably  2.         Before injections are started, 10 Trigger Points sites are identified throughout the bilateral L1, L2, L3, L4, L5 paraspinals muscle bilaterally.    3.         Overlying skin area was cleaned with Alcohol swab                                                                                                              4.         Before injection, trigger points sites were palpated for local twitch and referred pain to confirms placement                         5.         Local anesthetic was mixed with Depo-Medrol (5 cc of Marcaine 0.5% mixed with 5 cc of Depo-Medrol at 40 mg/ml - for a total of 10 cc)  6.         30 gauge ½ inch needle was utilized to ensure patient comfort          7.         I started with the most tender spot in above mentioned Trigger Points   1.   Localize most tender spot within taut muscle-fibers  2.   Fix tender spot between fingers (1-2 cm in size)   1.   Prevents from rolling away from needle  2.   Controls subcutaneous bleeding  3.   Before injection, patient was instructed of possible pain on injection  4.   Direct needle  at 30 degree angle off skin   8.         Insert needle into skin 1-2 cm from Trigger Point   9.         Advance needle into Trigger Point   10.       Use 1cc anesthetic at each Trigger Points identified in step #2  11.       Redirect needle and reinject                                                                                              1.   Withdraw needle to subcutaneous tissue  2.   Redirect needle into adjacent tender areas  3.   Repeat until local twitch or tautness resolves  12.   After each of the 10 injections I held direct pressure at injection site for 2 minutes to prevents hematoma formation  13.   The same procedure was repeated for other Tender Points located in L1, L2, L3, L4, L5 paraspinous muscles on both sides  14.   Patient was instructed to gently stretch injected areas to full active range of motion in all directions and was instructed to repeat range of motion three times after injection  15.   Post-Procedure patient was instructed to avoid over-using injected area for 3-4 days   1.   Maintain active range of motion of injected muscle  2.   Patient applies ice to injected areas for a few hours  3.   Anticipate post-injection soreness for 3-4 days  There was no evidence of complications from injection was noted such as local Skin Infection  at injection site or local hematoma at injection site    Marcaine lot #[id number]  Expiration date February 1, 2019  NDC #0409-161 0-5 0    Depo-Medrol lots number [id number]  Expiration date April 2020  NDC #0009-028 0-02    Mariano Leon MD, FAAN  11/20/2017

## 2018-02-14 ENCOUNTER — TELEPHONE (OUTPATIENT)
Dept: NEUROLOGY | Facility: CLINIC | Age: 33
End: 2018-02-14

## 2018-02-14 ENCOUNTER — HOSPITAL ENCOUNTER (EMERGENCY)
Facility: HOSPITAL | Age: 33
Discharge: HOME | End: 2018-02-14
Attending: EMERGENCY MEDICINE | Admitting: EMERGENCY MEDICINE

## 2018-02-14 VITALS
HEART RATE: 112 BPM | TEMPERATURE: 98.7 F | DIASTOLIC BLOOD PRESSURE: 86 MMHG | OXYGEN SATURATION: 98 % | WEIGHT: 293 LBS | SYSTOLIC BLOOD PRESSURE: 129 MMHG | HEIGHT: 65 IN | BODY MASS INDEX: 48.82 KG/M2 | RESPIRATION RATE: 18 BRPM

## 2018-02-14 DIAGNOSIS — IMO0001 RADICULAR PAIN OF RIGHT LOWER BACK: Primary | ICD-10-CM

## 2018-02-14 PROCEDURE — 99282 EMERGENCY DEPT VISIT SF MDM: CPT

## 2018-02-14 RX ORDER — CYCLOBENZAPRINE HCL 10 MG
10 TABLET ORAL 2 TIMES DAILY PRN
Qty: 12 TABLET | Refills: 0 | Status: SHIPPED | OUTPATIENT
Start: 2018-02-14 | End: 2018-06-04

## 2018-02-14 NOTE — TELEPHONE ENCOUNTER
"Pt called stating that her back \"popped\" last night and since that happened she has been having pain in her legs and causing her difficulty to walk.      Please advise  "

## 2018-02-14 NOTE — DISCHARGE INSTRUCTIONS
You may take Tylenol 650mg every 6-8 hours as well as ibuprofen 600mg every 6-8 hours as needed for pain or fever.    Please take the prescribed flexeril only when your pain is not otherwise controlled with Tylenol or ibuprofen and when you do not have any dangerous activities to perform such as driving, as this can be dangerous.           Back Exercises  If you have pain in your back, do these exercises 2-3 times each day or as told by your doctor. When the pain goes away, do the exercises once each day, but repeat the steps more times for each exercise (do more repetitions). If you do not have pain in your back, do these exercises once each day or as told by your doctor.  Exercises  Single Knee to Chest     Do these steps 3-5 times in a row for each le. Lie on your back on a firm bed or the floor with your legs stretched out.  2. Bring one knee to your chest.  3. Hold your knee to your chest by grabbing your knee or thigh.  4. Pull on your knee until you feel a gentle stretch in your lower back.  5. Keep doing the stretch for 10-30 seconds.  6. Slowly let go of your leg and straighten it.  Pelvic Tilt     Do these steps 5-10 times in a row:  1. Lie on your back on a firm bed or the floor with your legs stretched out.  2. Bend your knees so they point up to the ceiling. Your feet should be flat on the floor.  3. Tighten your lower belly (abdomen) muscles to press your lower back against the floor. This will make your tailbone point up to the ceiling instead of pointing down to your feet or the floor.  4. Stay in this position for 5-10 seconds while you gently tighten your muscles and breathe evenly.  Cat-Cow     Do these steps until your lower back bends more easily:  1. Get on your hands and knees on a firm surface. Keep your hands under your shoulders, and keep your knees under your hips. You may put padding under your knees.  2. Let your head hang down, and make your tailbone point down to the floor so your  lower back is round like the back of a cat.  3. Stay in this position for 5 seconds.  4. Slowly lift your head and make your tailbone point up to the ceiling so your back hangs low (sags) like the back of a cow.  5. Stay in this position for 5 seconds.  Press-Ups     Do these steps 5-10 times in a row:  1. Lie on your belly (face-down) on the floor.  2. Place your hands near your head, about shoulder-width apart.  3. While you keep your back relaxed and keep your hips on the floor, slowly straighten your arms to raise the top half of your body and lift your shoulders. Do not use your back muscles. To make yourself more comfortable, you may change where you place your hands.  4. Stay in this position for 5 seconds.  5. Slowly return to lying flat on the floor.  Bridges     Do these steps 10 times in a row:  1. Lie on your back on a firm surface.  2. Bend your knees so they point up to the ceiling. Your feet should be flat on the floor.  3. Tighten your butt muscles and lift your butt off of the floor until your waist is almost as high as your knees. If you do not feel the muscles working in your butt and the back of your thighs, slide your feet 1-2 inches farther away from your butt.  4. Stay in this position for 3-5 seconds.  5. Slowly lower your butt to the floor, and let your butt muscles relax.  If this exercise is too easy, try doing it with your arms crossed over your chest.  Belly Crunches     Do these steps 5-10 times in a row:  1. Lie on your back on a firm bed or the floor with your legs stretched out.  2. Bend your knees so they point up to the ceiling. Your feet should be flat on the floor.  3. Cross your arms over your chest.  4. Tip your chin a little bit toward your chest but do not bend your neck.  5. Tighten your belly muscles and slowly raise your chest just enough to lift your shoulder blades a tiny bit off of the floor.  6. Slowly lower your chest and your head to the floor.  Back Lifts   Do these  steps 5-10 times in a row:  1. Lie on your belly (face-down) with your arms at your sides, and rest your forehead on the floor.  2. Tighten the muscles in your legs and your butt.  3. Slowly lift your chest off of the floor while you keep your hips on the floor. Keep the back of your head in line with the curve in your back. Look at the floor while you do this.  4. Stay in this position for 3-5 seconds.  5. Slowly lower your chest and your face to the floor.  Contact a doctor if:  · Your back pain gets a lot worse when you do an exercise.  · Your back pain does not lessen 2 hours after you exercise.  If you have any of these problems, stop doing the exercises. Do not do them again unless your doctor says it is okay.  Get help right away if:  · You have sudden, very bad back pain. If this happens, stop doing the exercises. Do not do them again unless your doctor says it is okay.  This information is not intended to replace advice given to you by your health care provider. Make sure you discuss any questions you have with your health care provider.  Document Released: 01/20/2012 Document Revised: 05/25/2017 Document Reviewed: 02/11/2016  Elsevier Interactive Patient Education © 2017 [employer]

## 2018-02-14 NOTE — ED PROVIDER NOTES
Subjective   History of Present Illness   33M with morbid obesity, chronic lower back pain presenting with right lower back pain radiating down the leg.  States that she was in bed 2 nights ago when she changes position and felt a pop in her lower back.  Since then she has had radicular pain from her right lower back to find her right knee.  Worse when she stands up or walks.  Better when she is reclining. Denies any weakness/numbness in legs, loss of control of bowels or bladder, fever, IVDA.       Review of Systems   Musculoskeletal: Positive for back pain.   All other systems reviewed and are negative.      Past Medical History:   Diagnosis Date   • Anxiety    • Anxiety    • Asthma    • Bipolar disorder    • Depression    • Mitral valve prolapse    • Tattoo        No Known Allergies    Past Surgical History:   Procedure Laterality Date   • FIBULA FRACTURE SURGERY  2014, 2015   • MANDIBLE FRACTURE SURGERY  2002       Family History   Problem Relation Age of Onset   • Breast cancer Mother    • Diabetes Maternal Grandfather    • Colon cancer Neg Hx        Social History     Social History   • Marital status:      Spouse name: N/A   • Number of children: N/A   • Years of education: N/A     Social History Main Topics   • Smoking status: Never Smoker   • Smokeless tobacco: Never Used   • Alcohol use 0.6 oz/week     1 Glasses of wine per week      Comment: social   • Drug use: No   • Sexual activity: Yes     Partners: Male     Birth control/ protection: None     Other Topics Concern   • None     Social History Narrative           Objective   Physical Exam   Constitutional: She is oriented to person, place, and time. She appears well-nourished. No distress.   HENT:   Head: Normocephalic.   Mouth/Throat: Oropharynx is clear and moist. No oropharyngeal exudate.   Eyes: No scleral icterus.   Neck: Neck supple. No tracheal deviation present.   Cardiovascular: Normal rate, regular rhythm, normal heart sounds and intact  distal pulses.  Exam reveals no gallop and no friction rub.    No murmur heard.  Pulmonary/Chest: Effort normal and breath sounds normal. No stridor. No respiratory distress. She has no wheezes. She has no rales.   Abdominal: Soft. She exhibits no distension and no mass. There is no tenderness. There is no rebound and no guarding.   Musculoskeletal: She exhibits tenderness (R SI joint. No midling back ttp). She exhibits no edema or deformity.   Neurological: She is alert and oriented to person, place, and time.   Strength and sensation intact to BLE     Skin: Skin is warm and dry. She is not diaphoretic. No erythema. No pallor.   Psychiatric: She has a normal mood and affect. Her behavior is normal.   Nursing note and vitals reviewed.      Procedures         ED Course  ED Course                  MDM  33-year-old female here with right lower back pain with radicular symptoms.  Afebrile, vital signs stable.  Likely sciatica versus herniated disc.  Discussed use of Tylenol, ibuprofen, will give a short prescription for Flexeril.  Discussed importance of follow-up with her primary care for further management.    Final diagnoses:   Radicular pain of right lower back            Navneet Napoles MD  02/14/18 [id number]

## 2018-02-15 NOTE — TELEPHONE ENCOUNTER
Pt was advised that pain like this could be due to injury that cannot be seen, especially after feeling a pop. Pt was advised to contact PCP or local ED and have this checked out immediately. Pt verbalized understanding

## 2018-02-19 ENCOUNTER — HOSPITAL ENCOUNTER (EMERGENCY)
Facility: HOSPITAL | Age: 33
Discharge: HOME | End: 2018-02-19
Attending: EMERGENCY MEDICINE | Admitting: EMERGENCY MEDICINE

## 2018-02-19 VITALS
HEART RATE: 97 BPM | TEMPERATURE: 98.9 F | HEIGHT: 65 IN | OXYGEN SATURATION: 97 % | WEIGHT: 293 LBS | DIASTOLIC BLOOD PRESSURE: 86 MMHG | BODY MASS INDEX: 48.82 KG/M2 | RESPIRATION RATE: 17 BRPM | SYSTOLIC BLOOD PRESSURE: 130 MMHG

## 2018-02-19 DIAGNOSIS — S91.312A LACERATION OF LEFT FOOT, INITIAL ENCOUNTER: Primary | ICD-10-CM

## 2018-02-19 DIAGNOSIS — M54.31 SCIATICA, RIGHT SIDE: ICD-10-CM

## 2018-02-19 PROCEDURE — 99283 EMERGENCY DEPT VISIT LOW MDM: CPT

## 2018-02-19 PROCEDURE — 63710000001 PREDNISONE PER 5 MG: Performed by: PHYSICIAN ASSISTANT

## 2018-02-19 PROCEDURE — 90715 TDAP VACCINE 7 YRS/> IM: CPT | Performed by: PHYSICIAN ASSISTANT

## 2018-02-19 PROCEDURE — 90471 IMMUNIZATION ADMIN: CPT | Performed by: PHYSICIAN ASSISTANT

## 2018-02-19 PROCEDURE — 25010000002 TDAP 5-2.5-18.5 LF-MCG/0.5 SUSPENSION: Performed by: PHYSICIAN ASSISTANT

## 2018-02-19 RX ORDER — PREDNISONE 20 MG/1
60 TABLET ORAL DAILY
Qty: 12 TABLET | Refills: 0 | Status: SHIPPED | OUTPATIENT
Start: 2018-02-19 | End: 2018-02-26

## 2018-02-19 RX ADMIN — PREDNISONE 60 MG: 50 TABLET ORAL at 18:35

## 2018-02-19 RX ADMIN — TETANUS TOXOID, REDUCED DIPHTHERIA TOXOID AND ACELLULAR PERTUSSIS VACCINE, ADSORBED 0.5 ML: 5; 2.5; 8; 8; 2.5 SUSPENSION INTRAMUSCULAR at 18:50

## 2018-02-19 NOTE — ED NOTES
"Pt reports \"hearing a pop\" about two weeks ago, states pain in R hip that radiates down right leg since then. Pt states ambulation and bending down to sit cause pain to worsen. Pt denies any numbness/tingling at this time.      Karen Mathews RN  02/19/18 1802    "

## 2018-02-20 NOTE — DISCHARGE INSTRUCTIONS
Take meds as directed.  Follow with Edward tomorrow.  Follow with priamry care.  Return to ER for worsening symptms.

## 2018-02-20 NOTE — ED PROVIDER NOTES
Subjective   HPI Comments: Patient is 33-year-old female who is here today complaining of right buttocks pain.  Patient states that 1-1/2-2 weeks ago she heard a pop in her back as she was changing positions, she then began to have pain in her central buttocks on the right side that radiates to her right calf.  She states it is radiating in nature begins at the buttocks.  Worse with walking movement, burning in nature.  She states she was seen here 4-5 days ago given ibuprofen and Flexeril without significant improvement in her symptoms.  Patient denies loss of bowel or bladder, weakness of the right lower extremity back pain.      History provided by:  Patient      Review of Systems   Musculoskeletal:        Pain right lower extremity   All other systems reviewed and are negative.      Past Medical History:   Diagnosis Date   • Anxiety    • Anxiety    • Asthma    • Bipolar disorder    • Depression    • Mitral valve prolapse    • Tattoo        No Known Allergies    Past Surgical History:   Procedure Laterality Date   • FIBULA FRACTURE SURGERY  2014, 2015   • MANDIBLE FRACTURE SURGERY  2002       Family History   Problem Relation Age of Onset   • Breast cancer Mother    • Diabetes Maternal Grandfather    • Colon cancer Neg Hx        Social History     Social History   • Marital status:      Spouse name: N/A   • Number of children: N/A   • Years of education: N/A     Social History Main Topics   • Smoking status: Never Smoker   • Smokeless tobacco: Never Used   • Alcohol use 0.6 oz/week     1 Glasses of wine per week      Comment: social   • Drug use: No   • Sexual activity: Yes     Partners: Male     Birth control/ protection: None     Other Topics Concern   • None     Social History Narrative           Objective   Physical Exam   Constitutional: She is oriented to person, place, and time. She appears well-developed and well-nourished.   Morbid obesity   HENT:   Head: Normocephalic and atraumatic.   Eyes: EOM  are normal.   Cardiovascular: Regular rhythm and normal heart sounds.    tachy   Pulmonary/Chest: Effort normal and breath sounds normal.   Abdominal: Soft. Bowel sounds are normal.   Musculoskeletal: Normal range of motion.     In the right buttocks cheek, positive straight leg raise right side.  Range of motion is normal   Neurological: She is alert and oriented to person, place, and time.   Sensation right lower extremity is normal, strength is symmetric bilateral lower extremities without deficit   Skin: Skin is warm. No rash noted. No erythema.   Psychiatric: She has a normal mood and affect. Her behavior is normal. Thought content normal.   Nursing note and vitals reviewed.      Procedures         ED Course  ED Course      Patient was seen here in the emergency department for similar symptoms 2-3 days prior.  I personally reviewed that note.  Patient continues to have radicular symptoms of the right lower extremity, it is most consistent with sciatica.  We will give patient a steroid burst.  Patient has a follow-up appointment with Dr. Leon Monday after next and she should follow with that appointment.  Return for any worsening symptoms.            MDM    Final diagnoses:   Laceration of left foot, initial encounter   Sciatica, right side            Rina Raymundo PA-C  02/19/18 [id number]

## 2018-02-26 ENCOUNTER — PROCEDURE VISIT (OUTPATIENT)
Dept: NEUROLOGY | Facility: CLINIC | Age: 33
End: 2018-02-26

## 2018-02-26 VITALS
SYSTOLIC BLOOD PRESSURE: 132 MMHG | HEIGHT: 65 IN | OXYGEN SATURATION: 98 % | HEART RATE: 122 BPM | WEIGHT: 293 LBS | BODY MASS INDEX: 48.82 KG/M2 | DIASTOLIC BLOOD PRESSURE: 78 MMHG

## 2018-02-26 DIAGNOSIS — M79.18 MYOFASCIAL MUSCLE PAIN: Primary | ICD-10-CM

## 2018-02-26 PROCEDURE — 20553 NJX 1/MLT TRIGGER POINTS 3/>: CPT | Performed by: PSYCHIATRY & NEUROLOGY

## 2018-02-26 RX ORDER — TOPIRAMATE 100 MG/1
TABLET, FILM COATED ORAL
Refills: 1 | COMMUNITY
Start: 2018-01-31 | End: 2019-10-11

## 2018-02-26 RX ORDER — BUPIVACAINE HYDROCHLORIDE 7.5 MG/ML
5 INJECTION, SOLUTION EPIDURAL; RETROBULBAR ONCE
Status: DISCONTINUED | OUTPATIENT
Start: 2018-02-26 | End: 2019-08-28

## 2018-02-26 RX ORDER — METHYLPREDNISOLONE ACETATE 40 MG/ML
200 INJECTION, SUSPENSION INTRA-ARTICULAR; INTRALESIONAL; INTRAMUSCULAR; SOFT TISSUE ONCE
Status: COMPLETED | OUTPATIENT
Start: 2018-02-26 | End: 2018-02-26

## 2018-02-26 RX ADMIN — METHYLPREDNISOLONE ACETATE 200 MG: 40 INJECTION, SUSPENSION INTRA-ARTICULAR; INTRALESIONAL; INTRAMUSCULAR; SOFT TISSUE at 20:06

## 2018-02-27 NOTE — PROGRESS NOTES
Procedure note    Procedure: Trigger point injection    Indication: Patient has persistent bilateral low back pain patient underwent physical therapy, ibuprofen without significant improvement patient reports that she only benefits from previous trigger point injection and last visit.  Patient reported that her low back pain recur up to 2-1/2 weeks of benefit.  Patient even have difficulty arising from chair.    Procedure note  Patient is suffering from headache and myofacial pain syndrome. Risks and benefits of the Trigger point injection procedure have been explained to the patient.  Patient has no contraindications such as bleed diathesis, recent acute trauma at the muscle sites, anesthetic allergy or anticoagulation.  Mechanism of trigger point injection has been explained to patient.     Procedure Note:  1.         Patient was positioned comfortably  2.         Before injections are started, 10 Trigger Points sites are identified throughout the bilateral upper trapezius muscle, levator scapulae, and erector spinae muscles.    3.         Overlying skin area was cleaned with Alcohol swab                                                                                                              4.         Before injection, trigger points sites were palpated for local twitch and referred pain to confirms placement                         5.         Local anesthetic was mixed with Depo-Medrol (5 cc of Marcaine 0.5% mixed with 5 cc of Depo-Medrol at 40 mg/ml - for a total of 10 cc)  6.         30 gauge ½ inch needle was utilized to ensure patient comfort          7.         I started with the most tender spot in above mentioned Trigger Points   1.   Localize most tender spot within taut muscle-fibers  2.   Fix tender spot between fingers (1-2 cm in size)   1.   Prevents from rolling away from needle  2.   Controls subcutaneous bleeding  3.   Before injection, patient was instructed of possible pain on injection  4.    Direct needle at 30 degree angle off skin   8.         Insert needle into skin 1-2 cm from Trigger Point   9.         Advance needle into Trigger Point   10.       Use 1cc anesthetic at each Trigger Points identified in step #2  11.       Redirect needle and reinject                                                                                              1.   Withdraw needle to subcutaneous tissue  2.   Redirect needle into adjacent tender areas  3.   Repeat until local twitch or tautness resolves  12.   After each of the 10 injections I held direct pressure at injection site for 2 minutes to prevents hematoma formation  13.   The same procedure was repeated for other paraspinous muscle in bilateral lumbar region L1, L2, L3, L4, L5 and bilateral hip  14.   Patient was instructed to gently stretch injected areas to full active range of motion in all directions and was instructed to repeat range of motion three times after injection  15.   Post-Procedure patient was instructed to avoid over-using injected area for 3-4 days   1.   Maintain active range of motion of injected muscle  2.   Patient applies ice to injected areas for a few hours  3.   Anticipate post-injection soreness for 3-4 days  There was no evidence of complications from injection was noted such as local Skin Infection  at injection site or local hematoma at injection site    Marcaine lot number [id number]  Expiration date October 2019  NDC #5515 0-1 6 9-1 0    Depo-Medrol NDC #0009-028 0-02  Expiration date July 2020  Lots number [id number]    Mariano Leon MD, FAAN  02/26/2018

## 2018-03-06 ENCOUNTER — APPOINTMENT (OUTPATIENT)
Dept: CT IMAGING | Facility: HOSPITAL | Age: 33
End: 2018-03-06

## 2018-03-06 ENCOUNTER — HOSPITAL ENCOUNTER (EMERGENCY)
Facility: HOSPITAL | Age: 33
Discharge: HOME | End: 2018-03-06
Attending: EMERGENCY MEDICINE | Admitting: EMERGENCY MEDICINE

## 2018-03-06 VITALS
TEMPERATURE: 98 F | SYSTOLIC BLOOD PRESSURE: 125 MMHG | OXYGEN SATURATION: 97 % | HEART RATE: 99 BPM | HEIGHT: 65 IN | BODY MASS INDEX: 48.82 KG/M2 | WEIGHT: 293 LBS | RESPIRATION RATE: 18 BRPM | DIASTOLIC BLOOD PRESSURE: 74 MMHG

## 2018-03-06 DIAGNOSIS — N39.0 URINARY TRACT INFECTION WITH HEMATURIA, SITE UNSPECIFIED: ICD-10-CM

## 2018-03-06 DIAGNOSIS — R31.9 URINARY TRACT INFECTION WITH HEMATURIA, SITE UNSPECIFIED: ICD-10-CM

## 2018-03-06 DIAGNOSIS — R10.9 LEFT FLANK PAIN: Primary | ICD-10-CM

## 2018-03-06 LAB
ALBUMIN SERPL-MCNC: 4.5 G/DL (ref 3.5–5)
ALBUMIN/GLOB SERPL: 1.5 G/DL (ref 1–2)
ALP SERPL-CCNC: 91 U/L (ref 38–126)
ALT SERPL W P-5'-P-CCNC: 34 U/L (ref 13–69)
AMORPH URATE CRY URNS QL MICRO: ABNORMAL /HPF
ANION GAP SERPL CALCULATED.3IONS-SCNC: 20.7 MMOL/L
AST SERPL-CCNC: 19 U/L (ref 15–46)
B-HCG UR QL: NEGATIVE
BACTERIA UR QL AUTO: ABNORMAL /HPF
BASOPHILS # BLD AUTO: 0.04 10*3/MM3 (ref 0–0.2)
BASOPHILS NFR BLD AUTO: 0.4 % (ref 0–2.5)
BILIRUB SERPL-MCNC: 0.5 MG/DL (ref 0.2–1.3)
BILIRUB UR QL STRIP: ABNORMAL
BUN BLD-MCNC: 21 MG/DL (ref 7–20)
BUN/CREAT SERPL: 16.2 (ref 7.1–23.5)
CALCIUM SPEC-SCNC: 9.8 MG/DL (ref 8.4–10.2)
CHLORIDE SERPL-SCNC: 104 MMOL/L (ref 98–107)
CLARITY UR: CLEAR
CO2 SERPL-SCNC: 21 MMOL/L (ref 26–30)
COD CRY URNS QL: ABNORMAL /HPF
COLOR UR: YELLOW
CREAT BLD-MCNC: 1.3 MG/DL (ref 0.6–1.3)
DEPRECATED RDW RBC AUTO: 49.1 FL (ref 37–54)
EOSINOPHIL # BLD AUTO: 0.03 10*3/MM3 (ref 0–0.7)
EOSINOPHIL NFR BLD AUTO: 0.3 % (ref 0–7)
ERYTHROCYTE [DISTWIDTH] IN BLOOD BY AUTOMATED COUNT: 13.6 % (ref 11.5–14.5)
GFR SERPL CREATININE-BSD FRML MDRD: 47 ML/MIN/1.73
GLOBULIN UR ELPH-MCNC: 3 GM/DL
GLUCOSE BLD-MCNC: 119 MG/DL (ref 74–98)
GLUCOSE UR STRIP-MCNC: NEGATIVE MG/DL
HCT VFR BLD AUTO: 44.2 % (ref 37–47)
HGB BLD-MCNC: 13.8 G/DL (ref 12–16)
HGB UR QL STRIP.AUTO: ABNORMAL
HYALINE CASTS UR QL AUTO: ABNORMAL /LPF
IMM GRANULOCYTES # BLD: 0.04 10*3/MM3 (ref 0–0.06)
IMM GRANULOCYTES NFR BLD: 0.4 % (ref 0–0.6)
KETONES UR QL STRIP: NEGATIVE
LEUKOCYTE ESTERASE UR QL STRIP.AUTO: NEGATIVE
LIPASE SERPL-CCNC: 119 U/L (ref 23–300)
LYMPHOCYTES # BLD AUTO: 1.29 10*3/MM3 (ref 0.6–3.4)
LYMPHOCYTES NFR BLD AUTO: 13.9 % (ref 10–50)
MCH RBC QN AUTO: 30.5 PG (ref 27–31)
MCHC RBC AUTO-ENTMCNC: 31.2 G/DL (ref 30–37)
MCV RBC AUTO: 97.8 FL (ref 81–99)
MONOCYTES # BLD AUTO: 0.85 10*3/MM3 (ref 0–0.9)
MONOCYTES NFR BLD AUTO: 9.1 % (ref 0–12)
NEUTROPHILS # BLD AUTO: 7.04 10*3/MM3 (ref 2–6.9)
NEUTROPHILS NFR BLD AUTO: 75.9 % (ref 37–80)
NITRITE UR QL STRIP: NEGATIVE
NRBC BLD MANUAL-RTO: 0 /100 WBC (ref 0–0)
PH UR STRIP.AUTO: 5.5 [PH] (ref 5–8)
PLATELET # BLD AUTO: 232 10*3/MM3 (ref 130–400)
PMV BLD AUTO: 10.5 FL (ref 6–12)
POTASSIUM BLD-SCNC: 3.7 MMOL/L (ref 3.5–5.1)
PROT SERPL-MCNC: 7.5 G/DL (ref 6.3–8.2)
PROT UR QL STRIP: ABNORMAL
RBC # BLD AUTO: 4.52 10*6/MM3 (ref 4.2–5.4)
RBC # UR: ABNORMAL /HPF
REF LAB TEST METHOD: ABNORMAL
SODIUM BLD-SCNC: 142 MMOL/L (ref 137–145)
SP GR UR STRIP: >=1.03 (ref 1–1.03)
SQUAMOUS #/AREA URNS HPF: ABNORMAL /HPF
UROBILINOGEN UR QL STRIP: ABNORMAL
WBC NRBC COR # BLD: 9.29 10*3/MM3 (ref 4.8–10.8)
WBC UR QL AUTO: ABNORMAL /HPF

## 2018-03-06 PROCEDURE — 87086 URINE CULTURE/COLONY COUNT: CPT | Performed by: PHYSICIAN ASSISTANT

## 2018-03-06 PROCEDURE — 99283 EMERGENCY DEPT VISIT LOW MDM: CPT

## 2018-03-06 PROCEDURE — 81025 URINE PREGNANCY TEST: CPT | Performed by: PHYSICIAN ASSISTANT

## 2018-03-06 PROCEDURE — 96361 HYDRATE IV INFUSION ADD-ON: CPT

## 2018-03-06 PROCEDURE — 25010000002 ONDANSETRON PER 1 MG: Performed by: PHYSICIAN ASSISTANT

## 2018-03-06 PROCEDURE — 81001 URINALYSIS AUTO W/SCOPE: CPT | Performed by: PHYSICIAN ASSISTANT

## 2018-03-06 PROCEDURE — 96374 THER/PROPH/DIAG INJ IV PUSH: CPT

## 2018-03-06 PROCEDURE — 85025 COMPLETE CBC W/AUTO DIFF WBC: CPT | Performed by: PHYSICIAN ASSISTANT

## 2018-03-06 PROCEDURE — 80053 COMPREHEN METABOLIC PANEL: CPT | Performed by: PHYSICIAN ASSISTANT

## 2018-03-06 PROCEDURE — 83690 ASSAY OF LIPASE: CPT | Performed by: PHYSICIAN ASSISTANT

## 2018-03-06 PROCEDURE — 74176 CT ABD & PELVIS W/O CONTRAST: CPT

## 2018-03-06 PROCEDURE — 25010000002 KETOROLAC TROMETHAMINE PER 15 MG: Performed by: PHYSICIAN ASSISTANT

## 2018-03-06 PROCEDURE — 96375 TX/PRO/DX INJ NEW DRUG ADDON: CPT

## 2018-03-06 RX ORDER — ONDANSETRON 2 MG/ML
4 INJECTION INTRAMUSCULAR; INTRAVENOUS ONCE
Status: COMPLETED | OUTPATIENT
Start: 2018-03-06 | End: 2018-03-06

## 2018-03-06 RX ORDER — SODIUM CHLORIDE 0.9 % (FLUSH) 0.9 %
10 SYRINGE (ML) INJECTION AS NEEDED
Status: DISCONTINUED | OUTPATIENT
Start: 2018-03-06 | End: 2018-03-07 | Stop reason: HOSPADM

## 2018-03-06 RX ORDER — NAPROXEN 500 MG/1
500 TABLET ORAL 2 TIMES DAILY PRN
Qty: 14 TABLET | Refills: 0 | Status: SHIPPED | OUTPATIENT
Start: 2018-03-06 | End: 2019-10-11

## 2018-03-06 RX ORDER — KETOROLAC TROMETHAMINE 30 MG/ML
30 INJECTION, SOLUTION INTRAMUSCULAR; INTRAVENOUS ONCE
Status: COMPLETED | OUTPATIENT
Start: 2018-03-06 | End: 2018-03-06

## 2018-03-06 RX ORDER — CEPHALEXIN 250 MG/1
500 CAPSULE ORAL ONCE
Status: COMPLETED | OUTPATIENT
Start: 2018-03-06 | End: 2018-03-06

## 2018-03-06 RX ORDER — ONDANSETRON 4 MG/1
4 TABLET, ORALLY DISINTEGRATING ORAL EVERY 6 HOURS PRN
Qty: 12 TABLET | Refills: 0 | Status: SHIPPED | OUTPATIENT
Start: 2018-03-06 | End: 2018-06-04

## 2018-03-06 RX ORDER — PHENAZOPYRIDINE HYDROCHLORIDE 100 MG/1
200 TABLET, FILM COATED ORAL ONCE
Status: COMPLETED | OUTPATIENT
Start: 2018-03-06 | End: 2018-03-06

## 2018-03-06 RX ORDER — CEPHALEXIN 500 MG/1
500 CAPSULE ORAL 3 TIMES DAILY
Qty: 30 CAPSULE | Refills: 0 | Status: SHIPPED | OUTPATIENT
Start: 2018-03-06 | End: 2018-06-04

## 2018-03-06 RX ORDER — PHENAZOPYRIDINE HYDROCHLORIDE 200 MG/1
200 TABLET, FILM COATED ORAL 3 TIMES DAILY PRN
Qty: 5 TABLET | Refills: 0 | Status: SHIPPED | OUTPATIENT
Start: 2018-03-06 | End: 2018-06-04

## 2018-03-06 RX ADMIN — KETOROLAC TROMETHAMINE 30 MG: 30 INJECTION, SOLUTION INTRAMUSCULAR at 19:59

## 2018-03-06 RX ADMIN — PHENAZOPYRIDINE HYDROCHLORIDE 200 MG: 100 TABLET ORAL at 22:56

## 2018-03-06 RX ADMIN — SODIUM CHLORIDE 1000 ML: 9 INJECTION, SOLUTION INTRAVENOUS at 19:59

## 2018-03-06 RX ADMIN — ONDANSETRON 4 MG: 2 INJECTION INTRAMUSCULAR; INTRAVENOUS at 20:00

## 2018-03-06 RX ADMIN — CEPHALEXIN 500 MG: 250 CAPSULE ORAL at 22:57

## 2018-03-07 NOTE — DISCHARGE INSTRUCTIONS
Increase fluids, lots of cranberry juice.  Return to the ER for markedly worsening flank pain, high fevers, vomiting, new or worsening symptoms.    Follow-up with your doctor in 2-3 days for further workup, treatment and evaluation if not improving.

## 2018-03-07 NOTE — ED PROVIDER NOTES
Subjective   HPI Comments: 33-year-old female here with severe, sharp, stabbing pain in her left flank and lower back radiating through to her left lower quadrant and down into her left groin region since around 5:30 this evening, sudden onset.  No urinary complaints, vomiting or diarrhea.  She does have some nausea.  No history of kidney stones or kidney infections.  LMP 3 weeks ago.  Denies pregnancy.  Symptoms are progressively worsening.    Aggravating factors: None.  Alleviating factors: None.  Treatment prior to arrival: None.      History provided by:  Patient  History limited by:  Acuity of condition   used: No        Review of Systems   Constitutional: Negative.    HENT: Negative.    Eyes: Negative.    Respiratory: Negative.    Cardiovascular: Negative.  Negative for chest pain.   Gastrointestinal: Positive for abdominal pain and nausea.   Endocrine: Negative.    Genitourinary: Positive for flank pain. Negative for dysuria.   Skin: Negative.    Allergic/Immunologic: Negative.    Neurological: Negative.    Hematological: Negative.    Psychiatric/Behavioral: Negative.    All other systems reviewed and are negative.      Past Medical History:   Diagnosis Date   • Anxiety    • Anxiety    • Asthma    • Bipolar disorder    • Depression    • Mitral valve prolapse    • Tattoo        No Known Allergies    Past Surgical History:   Procedure Laterality Date   • FIBULA FRACTURE SURGERY  2014, 2015   • MANDIBLE FRACTURE SURGERY  2002       Family History   Problem Relation Age of Onset   • Breast cancer Mother    • Diabetes Maternal Grandfather    • Colon cancer Neg Hx        Social History     Social History   • Marital status:      Social History Main Topics   • Smoking status: Never Smoker   • Smokeless tobacco: Never Used   • Alcohol use 0.6 oz/week     1 Glasses of wine per week      Comment: social   • Drug use: No   • Sexual activity: Yes     Partners: Male     Birth control/  protection: None           Objective   Physical Exam   Constitutional: She is oriented to person, place, and time. She appears well-developed and well-nourished. No distress.   HENT:   Head: Normocephalic and atraumatic.   Right Ear: External ear normal.   Left Ear: External ear normal.   Eyes: EOM are normal. Pupils are equal, round, and reactive to light.   Neck: Normal range of motion. Neck supple.   Cardiovascular: Normal rate, regular rhythm and normal heart sounds.    Pulmonary/Chest: Effort normal and breath sounds normal. No stridor. She has no wheezes. She exhibits no tenderness.   Abdominal: Soft. She exhibits no distension. There is no tenderness. There is no rebound and no guarding.   Left CVA tenderness noted.   Musculoskeletal: Normal range of motion. She exhibits no edema.   Neurological: She is alert and oriented to person, place, and time.   Skin: Skin is warm and dry. No rash noted. She is not diaphoretic.   Psychiatric: She has a normal mood and affect. Her behavior is normal. Judgment and thought content normal.   Nursing note and vitals reviewed.      Procedures         ED Course  ED Course                  MDM  Number of Diagnoses or Management Options  Left flank pain: new and requires workup  Urinary tract infection with hematuria, site unspecified: new and requires workup     Amount and/or Complexity of Data Reviewed  Clinical lab tests: reviewed and ordered  Tests in the radiology section of CPT®: ordered and reviewed  Discuss the patient with other providers: yes    Risk of Complications, Morbidity, and/or Mortality  Presenting problems: moderate  Diagnostic procedures: low  Management options: moderate  General comments: Left flank pain with hematuria and bacteriuria.  No pyuria.  Negative CT scan for kidney stones.  We will treat for UTI as the cause of her left flank pain. Dr Leyva agrees.    Patient Progress  Patient progress: stable      Final diagnoses:   Left flank pain    Urinary tract infection with hematuria, site unspecified            Payal Biggs PA-C  03/06/18 [id number]

## 2018-03-08 LAB — BACTERIA SPEC AEROBE CULT: NORMAL

## 2018-03-20 ENCOUNTER — TRANSCRIBE ORDERS (OUTPATIENT)
Dept: LAB | Facility: HOSPITAL | Age: 33
End: 2018-03-20

## 2018-03-20 ENCOUNTER — LAB (OUTPATIENT)
Dept: LAB | Facility: HOSPITAL | Age: 33
End: 2018-03-20

## 2018-03-20 DIAGNOSIS — N39.9 DISEASE OF URINARY TRACT: Primary | ICD-10-CM

## 2018-03-20 DIAGNOSIS — R73.9 BLOOD GLUCOSE ELEVATED: ICD-10-CM

## 2018-03-20 DIAGNOSIS — N39.9 DISEASE OF URINARY TRACT: ICD-10-CM

## 2018-03-20 LAB
ANION GAP SERPL CALCULATED.3IONS-SCNC: 19.1 MMOL/L
BILIRUB UR QL STRIP: NEGATIVE
BUN BLD-MCNC: 17 MG/DL (ref 7–20)
BUN/CREAT SERPL: 14.2 (ref 7.1–23.5)
CALCIUM SPEC-SCNC: 9.7 MG/DL (ref 8.4–10.2)
CHLORIDE SERPL-SCNC: 103 MMOL/L (ref 98–107)
CLARITY UR: ABNORMAL
CO2 SERPL-SCNC: 27 MMOL/L (ref 26–30)
COLOR UR: YELLOW
CREAT BLD-MCNC: 1.2 MG/DL (ref 0.6–1.3)
GFR SERPL CREATININE-BSD FRML MDRD: 52 ML/MIN/1.73
GLUCOSE BLD-MCNC: 87 MG/DL (ref 74–98)
GLUCOSE UR STRIP-MCNC: NEGATIVE MG/DL
HBA1C MFR BLD: 5.2 % (ref 3–6)
HGB UR QL STRIP.AUTO: ABNORMAL
KETONES UR QL STRIP: NEGATIVE
LEUKOCYTE ESTERASE UR QL STRIP.AUTO: NEGATIVE
NITRITE UR QL STRIP: NEGATIVE
PH UR STRIP.AUTO: 6.5 [PH] (ref 5–8)
POTASSIUM BLD-SCNC: 4.1 MMOL/L (ref 3.5–5.1)
PROT UR QL STRIP: NEGATIVE
SODIUM BLD-SCNC: 145 MMOL/L (ref 137–145)
SP GR UR STRIP: 1.02 (ref 1–1.03)
UROBILINOGEN UR QL STRIP: ABNORMAL

## 2018-03-20 PROCEDURE — 81003 URINALYSIS AUTO W/O SCOPE: CPT

## 2018-03-20 PROCEDURE — 80048 BASIC METABOLIC PNL TOTAL CA: CPT

## 2018-03-20 PROCEDURE — 87086 URINE CULTURE/COLONY COUNT: CPT

## 2018-03-20 PROCEDURE — 36415 COLL VENOUS BLD VENIPUNCTURE: CPT

## 2018-03-20 PROCEDURE — 83036 HEMOGLOBIN GLYCOSYLATED A1C: CPT

## 2018-03-22 LAB — BACTERIA SPEC AEROBE CULT: NORMAL

## 2018-05-23 ENCOUNTER — TRANSCRIBE ORDERS (OUTPATIENT)
Dept: ADMINISTRATIVE | Facility: HOSPITAL | Age: 33
End: 2018-05-23

## 2018-05-23 ENCOUNTER — LAB (OUTPATIENT)
Dept: LAB | Facility: HOSPITAL | Age: 33
End: 2018-05-23

## 2018-05-23 DIAGNOSIS — N28.9 KIDNEY DYSFUNCTION: Primary | ICD-10-CM

## 2018-05-23 DIAGNOSIS — N28.9 KIDNEY DYSFUNCTION: ICD-10-CM

## 2018-05-23 LAB
ANION GAP SERPL CALCULATED.3IONS-SCNC: 15.3 MMOL/L (ref 10–20)
BUN BLD-MCNC: 18 MG/DL (ref 7–20)
BUN/CREAT SERPL: 15 (ref 7.1–23.5)
CALCIUM SPEC-SCNC: 9.6 MG/DL (ref 8.4–10.2)
CHLORIDE SERPL-SCNC: 104 MMOL/L (ref 98–107)
CO2 SERPL-SCNC: 27 MMOL/L (ref 26–30)
CREAT BLD-MCNC: 1.2 MG/DL (ref 0.6–1.3)
GFR SERPL CREATININE-BSD FRML MDRD: 52 ML/MIN/1.73
GLUCOSE BLD-MCNC: 84 MG/DL (ref 74–98)
POTASSIUM BLD-SCNC: 4.3 MMOL/L (ref 3.5–5.1)
SODIUM BLD-SCNC: 142 MMOL/L (ref 137–145)

## 2018-05-23 PROCEDURE — 36415 COLL VENOUS BLD VENIPUNCTURE: CPT

## 2018-05-23 PROCEDURE — 80048 BASIC METABOLIC PNL TOTAL CA: CPT

## 2018-06-04 ENCOUNTER — PROCEDURE VISIT (OUTPATIENT)
Dept: NEUROLOGY | Facility: CLINIC | Age: 33
End: 2018-06-04

## 2018-06-04 VITALS
BODY MASS INDEX: 48.82 KG/M2 | HEIGHT: 65 IN | HEART RATE: 96 BPM | SYSTOLIC BLOOD PRESSURE: 134 MMHG | WEIGHT: 293 LBS | DIASTOLIC BLOOD PRESSURE: 84 MMHG | OXYGEN SATURATION: 98 %

## 2018-06-04 DIAGNOSIS — M54.50 CHRONIC BILATERAL LOW BACK PAIN WITHOUT SCIATICA: Primary | ICD-10-CM

## 2018-06-04 DIAGNOSIS — G89.29 CHRONIC BILATERAL LOW BACK PAIN WITHOUT SCIATICA: Primary | ICD-10-CM

## 2018-06-04 PROCEDURE — 20553 NJX 1/MLT TRIGGER POINTS 3/>: CPT | Performed by: PSYCHIATRY & NEUROLOGY

## 2018-06-04 RX ORDER — METHYLPREDNISOLONE ACETATE 40 MG/ML
200 INJECTION, SUSPENSION INTRA-ARTICULAR; INTRALESIONAL; INTRAMUSCULAR; SOFT TISSUE ONCE
Status: COMPLETED | OUTPATIENT
Start: 2018-06-04 | End: 2018-06-04

## 2018-06-04 RX ORDER — BUPIVACAINE HYDROCHLORIDE 5 MG/ML
5 INJECTION, SOLUTION PERINEURAL ONCE
Status: DISCONTINUED | OUTPATIENT
Start: 2018-06-04 | End: 2019-08-28

## 2018-06-04 RX ORDER — LAMOTRIGINE 200 MG/1
300 TABLET ORAL 2 TIMES DAILY
COMMUNITY
End: 2019-10-11 | Stop reason: SDUPTHER

## 2018-06-04 RX ADMIN — METHYLPREDNISOLONE ACETATE 200 MG: 40 INJECTION, SUSPENSION INTRA-ARTICULAR; INTRALESIONAL; INTRAMUSCULAR; SOFT TISSUE at 21:12

## 2018-06-04 NOTE — PROGRESS NOTES
Procedure note    Procedure: Trigger point injection    Indication: Patient has persistent bilateral low back pain patient underwent physical therapy and ibuprofen without any improvement.  Patient has benefited from previous trigger point injection.  Patient reported that the benefits wear off a week prior to each injection.  When it wear off patient reported that she had difficulty even get off from the chair.    Procedure note  Patient is suffering from headache and myofacial pain syndrome. Risks and benefits of the Trigger point injection procedure have been explained to the patient.  Patient has no contraindications such as bleed diathesis, recent acute trauma at the muscle sites, anesthetic allergy or anticoagulation.  Mechanism of trigger point injection has been explained to patient.     Procedure Note:  1.         Patient was positioned comfortably  2.         Before injections are started, 10 Trigger Points sites are identified throughout the bilateral L1, L2 ,  L3, L4,  L5,   paraspinous muscle                                                                                                  4.         Before injection, trigger points sites were palpated for local twitch and referred pain to confirms placement                         5.         Local anesthetic was mixed with Depo-Medrol (5 cc of Marcaine 0.5% mixed with 5 cc of Depo-Medrol at 40 mg/ml - for a total of 10 cc)  6.         30 gauge ½ inch needle was utilized to ensure patient comfort          7.         I started with the most tender spot in above mentioned Trigger Points   1.   Localize most tender spot within taut muscle-fibers  2.   Fix tender spot between fingers (1-2 cm in size)   1.   Prevents from rolling away from needle  2.   Controls subcutaneous bleeding  3.   Before injection, patient was instructed of possible pain on injection  4.   Direct needle at 30 degree angle off skin   8.         Insert needle into skin 1-2 cm from  Trigger Point   9.         Advance needle into Trigger Point   10.       Use 1cc anesthetic at each Trigger Points identified in step #2  11.       Redirect needle and reinject                                                                                              1.   Withdraw needle to subcutaneous tissue  2.   Redirect needle into adjacent tender areas  3.   Repeat until local twitch or tautness resolves  12.   After each of the 10 injections I held direct pressure at injection site for 2 minutes to prevents hematoma formation  13.   The same procedure was repeated for other Tender Points located in the bilateral L1, L2 ,  L3, L4,  L5,   paraspinous muscle.                                                                                      14.   Patient was instructed to gently stretch injected areas to full active range of motion in all directions and was instructed to repeat range of motion three times after injection  15.   Post-Procedure patient was instructed to avoid over-using injected area for 3-4 days   1.   Maintain active range of motion of injected muscle  2.   Patient applies ice to injected areas for a few hours  3.   Anticipate post-injection soreness for 3-4 days  There was no evidence of complications from injection was noted such as local Skin Infection  at injection site or local hematoma at injection site    Marcaine  NDC #0409-116 0-1 8  Lots number [id number]  Expiration date January 1, 2020    Depo-Medrol  NDC #0009-028 0-02  Lots number [id number]  Expiration date July 2020    Mariano Leon MD, FAAN  06/04/2018

## 2018-06-05 ENCOUNTER — LAB (OUTPATIENT)
Dept: LAB | Facility: HOSPITAL | Age: 33
End: 2018-06-05
Attending: INTERNAL MEDICINE

## 2018-06-05 ENCOUNTER — TRANSCRIBE ORDERS (OUTPATIENT)
Dept: LAB | Facility: HOSPITAL | Age: 33
End: 2018-06-05

## 2018-06-05 DIAGNOSIS — I47.1 SVT (SUPRAVENTRICULAR TACHYCARDIA) (HCC): Primary | ICD-10-CM

## 2018-06-05 DIAGNOSIS — I47.1 SVT (SUPRAVENTRICULAR TACHYCARDIA) (HCC): ICD-10-CM

## 2018-06-05 LAB
T4 FREE SERPL-MCNC: 1.09 NG/DL (ref 0.78–2.19)
TSH SERPL DL<=0.05 MIU/L-ACNC: 1.56 MIU/ML (ref 0.47–4.68)

## 2018-06-05 PROCEDURE — 84439 ASSAY OF FREE THYROXINE: CPT

## 2018-06-05 PROCEDURE — 84443 ASSAY THYROID STIM HORMONE: CPT

## 2018-06-05 PROCEDURE — 36415 COLL VENOUS BLD VENIPUNCTURE: CPT

## 2018-07-10 ENCOUNTER — OFFICE VISIT (OUTPATIENT)
Dept: UROLOGY | Facility: CLINIC | Age: 33
End: 2018-07-10

## 2018-07-10 VITALS
TEMPERATURE: 98.3 F | WEIGHT: 293 LBS | OXYGEN SATURATION: 99 % | BODY MASS INDEX: 48.82 KG/M2 | HEIGHT: 65 IN | HEART RATE: 90 BPM

## 2018-07-10 DIAGNOSIS — R31.9 HEMATURIA, UNSPECIFIED TYPE: Primary | ICD-10-CM

## 2018-07-10 LAB
BILIRUB BLD-MCNC: NEGATIVE MG/DL
CLARITY, POC: CLEAR
COLOR UR: YELLOW
GLUCOSE UR STRIP-MCNC: NEGATIVE MG/DL
KETONES UR QL: NEGATIVE
LEUKOCYTE EST, POC: NEGATIVE
NITRITE UR-MCNC: NEGATIVE MG/ML
PH UR: 6 [PH] (ref 5–8)
PROT UR STRIP-MCNC: NEGATIVE MG/DL
RBC # UR STRIP: ABNORMAL /UL
SP GR UR: 1.03 (ref 1–1.03)
UROBILINOGEN UR QL: NORMAL

## 2018-07-10 PROCEDURE — 52265 CYSTOSCOPY AND TREATMENT: CPT | Performed by: UROLOGY

## 2018-07-10 PROCEDURE — 81003 URINALYSIS AUTO W/O SCOPE: CPT | Performed by: UROLOGY

## 2018-07-10 PROCEDURE — 99203 OFFICE O/P NEW LOW 30 MIN: CPT | Performed by: UROLOGY

## 2018-07-10 NOTE — PROGRESS NOTES
Chief Complaint  Blood in Urine (Patient was referred for hematuria.)      HPI  Vicky Maxwell is a 33 y.o.female who referred for evaluation of microscopic hematuria.  On 3 occasions she's had a tiny trace of blood and microscopically revealed 0-2 red cells which is insignificant.  She's had several urine cultures that were contaminated are no growth based on voided samples.  She has a history of irregular menstrual periods although she states that's better now.  On one occasion she went to the emergency room for left-sided flank pain and underwent a CT scan that was within normal limits with specifically no signs of any renal pathology.  She also had a CT scan in 2016 that was within normal limits.    Vitals:    07/10/18 1500   Pulse: 90   Temp: 98.3 °F (36.8 °C)   SpO2: 99%       Past Medical History  Past Medical History:   Diagnosis Date   • Anxiety    • Anxiety    • Asthma    • Bipolar disorder (CMS/HCC)    • Depression    • Mitral valve prolapse    • Tattoo        Past Surgical History  Past Surgical History:   Procedure Laterality Date   • FIBULA FRACTURE SURGERY  2014, 2015   • MANDIBLE FRACTURE SURGERY  2002       Medications  has a current medication list which includes the following prescription(s): aripiprazole, bupropion xl, clonazepam, ibuprofen, lamotrigine, naproxen, topiramate, and ventolin hfa, and the following Facility-Administered Medications: bupivacaine, bupivacaine pf, bupivacaine pf, and bupivacaine pf.    Allergies  No Known Allergies    Social History  Social History     Social History   • Marital status:      Spouse name: N/A   • Number of children: N/A   • Years of education: N/A     Occupational History   • Not on file.     Social History Main Topics   • Smoking status: Never Smoker   • Smokeless tobacco: Never Used   • Alcohol use 0.6 oz/week     1 Glasses of wine per week      Comment: social   • Drug use: No   • Sexual activity: Yes     Partners: Male     Birth  control/ protection: None     Other Topics Concern   • Not on file     Social History Narrative   • No narrative on file       Family History  Family History   Problem Relation Age of Onset   • Breast cancer Mother    • Diabetes Maternal Grandfather    • Colon cancer Neg Hx        Review of Systems  Review of Systems  Positive for weight gain fast heart rate chest pain palpitations shortness of breath diarrhea numbness sleep disturbances anxiety depression emotional problems sadness but negative in all other categories.  Physical Exam  Physical Exam   Constitutional: She is oriented to person, place, and time. She appears well-developed and well-nourished.   HENT:   Head: Normocephalic.   Eyes: EOM are normal. Pupils are equal, round, and reactive to light.   Neck: Normal range of motion. Neck supple.   Cardiovascular: Normal rate, regular rhythm and normal heart sounds.    Pulmonary/Chest: Effort normal.   Abdominal: Soft. Bowel sounds are normal.   Genitourinary: Vagina normal and uterus normal.   Neurological: She is alert and oriented to person, place, and time.   Skin: Skin is warm and dry.   Psychiatric: She has a normal mood and affect.       Labs recent and today in the office:  Results for orders placed or performed in visit on 07/10/18   POC Urinalysis Dipstick, Automated   Result Value Ref Range    Color Yellow Yellow, Straw, Dark Yellow, Deepa    Clarity, UA Clear Clear    Specific Gravity  1.030 1.005 - 1.030    pH, Urine 6.0 5.0 - 8.0    Leukocytes Negative Negative    Nitrite, UA Negative Negative    Protein, POC Negative Negative mg/dL    Glucose, UA Negative Negative, 1000 mg/dL (3+) mg/dL    Ketones, UA Negative Negative    Urobilinogen, UA Normal Normal    Bilirubin Negative Negative    Blood, UA Trace (A) Negative      Female cystoscopy:     The patient is prepped and draped in the dorsal lithotomy position in a routine sterile fashion. The vulva and urethral meatus are inspected and found to be  normal. The panendoscope is inserted in the bladder which is visualized with the Foroblique and 70 degree lenses and found to be free of foreign bodies and mucosal lesions. Ureteral orifices are normal location and effluxing clear urine bilaterally.  Her postvoid residual is only 2 ml and occluded with Xylocaine jelly renders catheter specimen for dipstick hemoglobin inaccurate.  The bladder is distended to 500 mL drained and on reinspection there are no signs of stones tumor or polyp or interstitial cystitis.    Assessment & Plan  Hematuria: Microscopic hematuria of no clinical significance in light of 2 normal CT scans and cystoscopy.  No  pathology is demonstrated recommend she check back with her family doctor for problems.  With antibiotics to prevent infection from the instrumentation.

## 2018-10-12 ENCOUNTER — HOSPITAL ENCOUNTER (OUTPATIENT)
Dept: GENERAL RADIOLOGY | Facility: HOSPITAL | Age: 33
Discharge: HOME | End: 2018-10-12
Admitting: NURSE PRACTITIONER

## 2018-10-12 ENCOUNTER — TRANSCRIBE ORDERS (OUTPATIENT)
Dept: ADMINISTRATIVE | Facility: HOSPITAL | Age: 33
End: 2018-10-12

## 2018-10-12 DIAGNOSIS — M25.561 ACUTE PAIN OF RIGHT KNEE: Primary | ICD-10-CM

## 2018-10-12 PROCEDURE — 73562 X-RAY EXAM OF KNEE 3: CPT

## 2018-11-26 DIAGNOSIS — R52 PAIN: Primary | ICD-10-CM

## 2019-01-03 ENCOUNTER — OFFICE VISIT (OUTPATIENT)
Dept: ORTHOPEDIC SURGERY | Facility: CLINIC | Age: 34
End: 2019-01-03

## 2019-01-03 VITALS — HEIGHT: 65 IN | RESPIRATION RATE: 18 BRPM | BODY MASS INDEX: 48.82 KG/M2 | WEIGHT: 293 LBS

## 2019-01-03 DIAGNOSIS — M17.10 ARTHRITIS OF KNEE: Primary | ICD-10-CM

## 2019-01-03 PROCEDURE — 99204 OFFICE O/P NEW MOD 45 MIN: CPT | Performed by: ORTHOPAEDIC SURGERY

## 2019-01-03 RX ORDER — BUPROPION HYDROCHLORIDE 150 MG/1
150 TABLET ORAL DAILY
Refills: 1 | COMMUNITY
Start: 2018-12-26 | End: 2019-10-11

## 2019-01-03 RX ORDER — CLONAZEPAM 1 MG/1
1 TABLET ORAL 2 TIMES DAILY
Refills: 1 | COMMUNITY
Start: 2018-12-26 | End: 2019-10-11

## 2019-01-03 RX ORDER — BISOPROLOL FUMARATE 10 MG/1
10 TABLET, FILM COATED ORAL DAILY
Refills: 3 | COMMUNITY
Start: 2018-12-26 | End: 2020-01-23

## 2019-01-03 NOTE — PROGRESS NOTES
Subjective   Patient ID: Vicky Maxwell is a 33 y.o. female  Pain of the Right Knee (Patient states she fell about 3 months ago and landed on her butt, she doesn't recall twisting her knee but states ever since she fell, her knee has hurt. She states she has always had trouble with her knees and the right hurts more than the left. Her pa)             History of Present Illness    Bilateral knee pain right worse than left she has lost over 33 pounds last for 5 months, denies acute trauma, pain is worse going up and down stairs getting out of a chair, denies posterior knee pain paresthesias swelling in the ankle.  Has tried medications with no improvement has not done physical therapy.  Has never had injections.    Review of Systems   Constitutional: Negative for fever.   HENT: Negative for voice change.    Eyes: Negative for visual disturbance.   Respiratory: Negative for shortness of breath.    Cardiovascular: Negative for chest pain.   Gastrointestinal: Negative for abdominal distention and abdominal pain.   Genitourinary: Negative for dysuria.   Musculoskeletal: Positive for arthralgias. Negative for gait problem and joint swelling.   Skin: Negative for rash.   Neurological: Negative for speech difficulty.   Hematological: Does not bruise/bleed easily.   Psychiatric/Behavioral: Negative for confusion.       Past Medical History:   Diagnosis Date   • Anxiety    • Anxiety    • Asthma    • Bipolar disorder (CMS/HCC)    • Depression    • Mitral valve prolapse    • Tattoo         Past Surgical History:   Procedure Laterality Date   • FIBULA FRACTURE SURGERY  2014, 2015   • MANDIBLE FRACTURE SURGERY  2002       Family History   Problem Relation Age of Onset   • Breast cancer Mother    • Diabetes Maternal Grandfather    • Colon cancer Neg Hx        Social History     Socioeconomic History   • Marital status:      Spouse name: Not on file   • Number of children: Not on file   • Years of education: Not on  file   • Highest education level: Not on file   Social Needs   • Financial resource strain: Not on file   • Food insecurity - worry: Not on file   • Food insecurity - inability: Not on file   • Transportation needs - medical: Not on file   • Transportation needs - non-medical: Not on file   Occupational History   • Not on file   Tobacco Use   • Smoking status: Never Smoker   • Smokeless tobacco: Never Used   Substance and Sexual Activity   • Alcohol use: Yes     Alcohol/week: 0.6 oz     Types: 1 Glasses of wine per week     Comment: social   • Drug use: No   • Sexual activity: Yes     Partners: Male     Birth control/protection: None   Other Topics Concern   • Not on file   Social History Narrative   • Not on file       I have reviewed all of the above social hx, family hx, surgical hx, medications, allergies & ROS and confirm that it is accurate.    No Known Allergies      Current Outpatient Medications:   •  ARIPiprazole (ABILIFY) 30 MG tablet, Daily., Disp: , Rfl:   •  bisoprolol (ZEBeta) 10 MG tablet, Take 10 mg by mouth Daily., Disp: , Rfl: 3  •  buPROPion XL (WELLBUTRIN XL) 150 MG 24 hr tablet, Take 150 mg by mouth Daily., Disp: , Rfl: 1  •  buPROPion XL (WELLBUTRIN XL) 300 MG 24 hr tablet, 1 tablet Daily., Disp: , Rfl: 2  •  clonazePAM (KlonoPIN) 0.5 MG tablet, TAKE 1 TABLET TWICE A DAY, Disp: , Rfl: 0  •  clonazePAM (KlonoPIN) 1 MG tablet, Take 1 mg by mouth 2 (Two) Times a Day., Disp: , Rfl: 1  •  ibuprofen (ADVIL,MOTRIN) 800 MG tablet, 3 (Three) Times a Day As Needed., Disp: , Rfl: 1  •  lamoTRIgine (LaMICtal) 200 MG tablet, Take 300 mg by mouth 2 (Two) Times a Day., Disp: , Rfl:   •  naproxen (NAPROSYN) 500 MG tablet, Take 1 tablet by mouth 2 (Two) Times a Day As Needed for Moderate Pain ., Disp: 14 tablet, Rfl: 0  •  topiramate (TOPAMAX) 100 MG tablet, TAKE ONE TABLET BY MOUTH TWICE DAILY, Disp: , Rfl: 1  •  VENTOLIN  (90 BASE) MCG/ACT inhaler, INHALE 2 PUFF BY INHALATION ROUTE EVERY 4 - 6 HOURS  "AS NEEDED, Disp: , Rfl: 0    Current Facility-Administered Medications:   •  bupivacaine (MARCAINE) 0.5 % injection 5 mL, 5 mL, Injection, Once, Mariano Leon MD, FAAN  •  bupivacaine PF (MARCAINE) 0.75 % injection 37.5 mg, 5 mL, Injection, Once, Mariano Leon MD, FAAN  •  bupivacaine PF (MARCAINE) 0.75 % injection 37.5 mg, 5 mL, Injection, Once, Mariano Leon MD, FAAN  •  bupivacaine PF (MARCAINE) 0.75 % injection 37.5 mg, 5 mL, Injection, Once, Mariano Leon MD, FAAN    Objective   Resp 18   Ht 165.1 cm (65\")   Wt (!) 171 kg (377 lb)   BMI 62.74 kg/m²    Physical Exam  Constitutional: Patient is oriented to person, place, and time. Patient appears well-developed and well-nourished.   HENT:Head: Normocephalic and atraumatic.   Eyes: EOM are normal. Pupils are equal, round, and reactive to light.   Neck: Normal range of motion. Neck supple.   Cardiovascular: Normal rate.    Pulmonary/Chest: Effort normal and breath sounds normal.   Abdominal: Soft.   Neurological: Patient is alert and oriented to person, place, and time.   Skin: Skin is warm and dry.   Psychiatric: Patient has a normal mood and affect.   Nursing note and vitals reviewed.       [unfilled]   Left and right knees confirmed patellofemoral crepitus no effusion full range of motion negative ligament exam is negative Rinku's findings Supple some quadriceps atrophy no edema in the ankles    Assessment/Plan   Review of Radiographic Studies:    Radiographic images today of affected area I personally viewed and showed no sign of acute fracture or dislocation.      Procedures     Vicky was seen today for pain.    Diagnoses and all orders for this visit:    Arthritis of knee  -     Ambulatory Referral to Physical Therapy Evaluate and treat       Physical therapy referral given      Recommendations/Plan:   Referral: PT/OT 2-3 x wk x 4-6 wks    Patient agreeable to call or return sooner for any concerns.         I reviewed the patient's x-rays in " detail and explained the nature of the condition using models and descriptive illustrations demonstrated the presence of arthritis, its treatment options were reviewed, surgical and nonsurgical treatment options were reviewed discussed and explained along with risks and complications of each and natural history.  The patient had opportunity to ask questions which were all answered satisfactorily.    Impression:  BMI 62, bilateral anterior knee pain, bilateral knee osteoarthritis  Plan:  Continued weight loss, PT referral, recheck in 2 months if not improving consider steroid versus gel injections

## 2019-02-07 ENCOUNTER — TRANSCRIBE ORDERS (OUTPATIENT)
Dept: ADMINISTRATIVE | Facility: HOSPITAL | Age: 34
End: 2019-02-07

## 2019-02-07 DIAGNOSIS — N18.30 CHRONIC KIDNEY DISEASE, STAGE III (MODERATE) (HCC): Primary | ICD-10-CM

## 2019-02-13 ENCOUNTER — HOSPITAL ENCOUNTER (OUTPATIENT)
Dept: ULTRASOUND IMAGING | Facility: HOSPITAL | Age: 34
Discharge: HOME | End: 2019-02-13
Admitting: INTERNAL MEDICINE

## 2019-02-13 DIAGNOSIS — N18.30 CHRONIC KIDNEY DISEASE, STAGE III (MODERATE) (HCC): ICD-10-CM

## 2019-02-13 PROCEDURE — 76775 US EXAM ABDO BACK WALL LIM: CPT

## 2019-03-05 ENCOUNTER — OFFICE VISIT (OUTPATIENT)
Dept: ORTHOPEDIC SURGERY | Facility: CLINIC | Age: 34
End: 2019-03-05

## 2019-03-05 VITALS — HEIGHT: 65 IN | BODY MASS INDEX: 48.82 KG/M2 | WEIGHT: 293 LBS | RESPIRATION RATE: 18 BRPM

## 2019-03-05 DIAGNOSIS — M17.10 ARTHRITIS OF KNEE: Primary | ICD-10-CM

## 2019-03-05 DIAGNOSIS — M94.261 CHONDROMALACIA OF RIGHT KNEE: ICD-10-CM

## 2019-03-05 PROCEDURE — 99214 OFFICE O/P EST MOD 30 MIN: CPT | Performed by: ORTHOPAEDIC SURGERY

## 2019-03-05 PROCEDURE — 20610 DRAIN/INJ JOINT/BURSA W/O US: CPT | Performed by: ORTHOPAEDIC SURGERY

## 2019-03-05 RX ORDER — LIDOCAINE HYDROCHLORIDE 10 MG/ML
2 INJECTION, SOLUTION INFILTRATION; PERINEURAL
Status: COMPLETED | OUTPATIENT
Start: 2019-03-05 | End: 2019-03-05

## 2019-03-05 RX ORDER — TRIAMCINOLONE ACETONIDE 40 MG/ML
40 INJECTION, SUSPENSION INTRA-ARTICULAR; INTRAMUSCULAR
Status: COMPLETED | OUTPATIENT
Start: 2019-03-05 | End: 2019-03-05

## 2019-03-05 RX ADMIN — LIDOCAINE HYDROCHLORIDE 2 ML: 10 INJECTION, SOLUTION INFILTRATION; PERINEURAL at 16:06

## 2019-03-05 RX ADMIN — TRIAMCINOLONE ACETONIDE 40 MG: 40 INJECTION, SUSPENSION INTRA-ARTICULAR; INTRAMUSCULAR at 16:06

## 2019-03-05 NOTE — PROGRESS NOTES
Subjective   Patient ID: Vicky Maxwell is a 34 y.o. female  Follow-up and Pain of the Right Knee (Patient is here today for a follow up on her right knee, she states her knee was doing great until last week when her knee popped really loud and now the pain is increased and hurts more than ever; she also says weight bearing is a problem along with steps. Her pain level is 5-8/10.)             History of Present Illness  Right anterior knee pain worse with bending squatting twisting going up and down stairs all started when she was kneeling on the bed trying to push her sheet and when she got off the bed she felt a pop and had anterior knee pain.  Last x-ray in October showed arthritis of the right knee.  Currently has trouble taking anti-inflammatories due to stomach issues and she is worried about kidney side effects with medications.      Review of Systems   Constitutional: Negative for fever.   HENT: Negative for voice change.    Eyes: Negative for visual disturbance.   Respiratory: Negative for shortness of breath.    Cardiovascular: Negative for chest pain.   Gastrointestinal: Negative for abdominal distention and abdominal pain.   Genitourinary: Negative for dysuria.   Musculoskeletal: Positive for arthralgias. Negative for gait problem and joint swelling.   Skin: Negative for rash.   Neurological: Negative for speech difficulty.   Hematological: Does not bruise/bleed easily.   Psychiatric/Behavioral: Negative for confusion.       Past Medical History:   Diagnosis Date   • Anxiety    • Anxiety    • Asthma    • Bipolar disorder (CMS/HCC)    • Depression    • Mitral valve prolapse    • Tattoo         Past Surgical History:   Procedure Laterality Date   • FIBULA FRACTURE SURGERY  2014, 2015   • MANDIBLE FRACTURE SURGERY  2002       Family History   Problem Relation Age of Onset   • Breast cancer Mother    • Diabetes Maternal Grandfather    • Colon cancer Neg Hx        Social History     Socioeconomic  History   • Marital status:      Spouse name: Not on file   • Number of children: Not on file   • Years of education: Not on file   • Highest education level: Not on file   Social Needs   • Financial resource strain: Not on file   • Food insecurity - worry: Not on file   • Food insecurity - inability: Not on file   • Transportation needs - medical: Not on file   • Transportation needs - non-medical: Not on file   Occupational History   • Not on file   Tobacco Use   • Smoking status: Never Smoker   • Smokeless tobacco: Never Used   Substance and Sexual Activity   • Alcohol use: Yes     Alcohol/week: 0.6 oz     Types: 1 Glasses of wine per week     Comment: social   • Drug use: No   • Sexual activity: Yes     Partners: Male     Birth control/protection: None   Other Topics Concern   • Not on file   Social History Narrative   • Not on file       I have reviewed all of the above social hx, family hx, surgical hx, medications, allergies & ROS and confirm that it is accurate.    No Known Allergies      Current Outpatient Medications:   •  ARIPiprazole (ABILIFY) 30 MG tablet, Daily., Disp: , Rfl:   •  bisoprolol (ZEBeta) 10 MG tablet, Take 10 mg by mouth Daily., Disp: , Rfl: 3  •  buPROPion XL (WELLBUTRIN XL) 150 MG 24 hr tablet, Take 150 mg by mouth Daily., Disp: , Rfl: 1  •  buPROPion XL (WELLBUTRIN XL) 300 MG 24 hr tablet, 1 tablet Daily., Disp: , Rfl: 2  •  clonazePAM (KlonoPIN) 0.5 MG tablet, TAKE 1 TABLET TWICE A DAY, Disp: , Rfl: 0  •  clonazePAM (KlonoPIN) 1 MG tablet, Take 1 mg by mouth 2 (Two) Times a Day., Disp: , Rfl: 1  •  ibuprofen (ADVIL,MOTRIN) 800 MG tablet, 3 (Three) Times a Day As Needed., Disp: , Rfl: 1  •  lamoTRIgine (LaMICtal) 200 MG tablet, Take 300 mg by mouth 2 (Two) Times a Day., Disp: , Rfl:   •  naproxen (NAPROSYN) 500 MG tablet, Take 1 tablet by mouth 2 (Two) Times a Day As Needed for Moderate Pain ., Disp: 14 tablet, Rfl: 0  •  topiramate (TOPAMAX) 100 MG tablet, TAKE ONE TABLET BY  "MOUTH TWICE DAILY, Disp: , Rfl: 1  •  VENTOLIN  (90 BASE) MCG/ACT inhaler, INHALE 2 PUFF BY INHALATION ROUTE EVERY 4 - 6 HOURS AS NEEDED, Disp: , Rfl: 0    Current Facility-Administered Medications:   •  bupivacaine (MARCAINE) 0.5 % injection 5 mL, 5 mL, Injection, Once, Mariano Leon MD, FAAN  •  bupivacaine PF (MARCAINE) 0.75 % injection 37.5 mg, 5 mL, Injection, Once, Mariano Leon MD, FAAN  •  bupivacaine PF (MARCAINE) 0.75 % injection 37.5 mg, 5 mL, Injection, Once, Mariano Leon MD, FAAN  •  bupivacaine PF (MARCAINE) 0.75 % injection 37.5 mg, 5 mL, Injection, Once, Mariano Leon MD, FAAN    Objective   Resp 18   Ht 165.1 cm (65\")   Wt (!) 167 kg (369 lb)   BMI 61.40 kg/m²    Physical Exam  Constitutional: Patient is oriented to person, place, and time. Patient appears well-developed and well-nourished.   HENT:Head: Normocephalic and atraumatic.   Eyes: EOM are normal. Pupils are equal, round, and reactive to light.   Neck: Normal range of motion. Neck supple.   Cardiovascular: Normal rate.    Pulmonary/Chest: Effort normal and breath sounds normal.   Abdominal: Soft.   Neurological: Patient is alert and oriented to person, place, and time.   Skin: Skin is warm and dry.   Psychiatric: Patient has a normal mood and affect.   Nursing note and vitals reviewed.       [unfilled]   Right knee: 2+ effusion skin appears normal minimal warmth extension -5 flexion at 120 good stability Rinku sign negative for medial lateral joint line pain positive anterolateral patellofemoral crepitus calf supple no edema in the ankle.  Normal alignment some crepitus with range of motion.    Assessment/Plan   Review of Radiographic Studies:    No new imaging done today.      Large Joint Arthrocentesis: R knee  Date/Time: 3/5/2019 4:06 PM  Consent given by: patient  Site marked: site marked  Timeout: Immediately prior to procedure a time out was called to verify the correct patient, procedure, equipment, support staff " and site/side marked as required   Supporting Documentation  Indications: pain   Procedure Details  Location: knee - R knee  Preparation: Patient was prepped and draped in the usual sterile fashion  Needle size: 22 G  Medications administered: 40 mg triamcinolone acetonide 40 MG/ML; 2 mL lidocaine 1 %  Patient tolerance: patient tolerated the procedure well with no immediate complications           Vicky was seen today for follow-up and pain.    Diagnoses and all orders for this visit:    Arthritis of knee    Chondromalacia of right knee       Orthopedic activities reviewed and patient expressed appreciation and Risk, benefits, and merits of treatment alternatives reviewed with the patient and questions answered      Recommendations/Plan:   Work/Activity Status: May perform usual activities as tolerated    Patient agreeable to call or return sooner for any concerns.             Impression:  BMI 61, anterolateral right knee pain chondromalacia patella symptoms with osteoarthritis  Plan:  Therapy referral recheck 2 months if not improving order MRI at that time

## 2019-03-22 ENCOUNTER — TREATMENT (OUTPATIENT)
Dept: PHYSICAL THERAPY | Facility: CLINIC | Age: 34
End: 2019-03-22

## 2019-03-22 DIAGNOSIS — M25.561 ACUTE PAIN OF RIGHT KNEE: ICD-10-CM

## 2019-03-22 DIAGNOSIS — M25.361 PATELLAR INSTABILITY OF RIGHT KNEE: Primary | ICD-10-CM

## 2019-03-22 PROCEDURE — 97110 THERAPEUTIC EXERCISES: CPT | Performed by: PHYSICAL THERAPIST

## 2019-03-22 PROCEDURE — 97161 PT EVAL LOW COMPLEX 20 MIN: CPT | Performed by: PHYSICAL THERAPIST

## 2019-03-22 NOTE — PROGRESS NOTES
Physical Therapy Initial Evaluation and Plan of Care      Patient: Vicky Maxwell   : 1985  Diagnosis/ICD-10 Code:  No primary diagnosis found.  Referring practitioner: Sagar Zarate MD    Subjective Evaluation    History of Present Illness  Date of onset: 10/1/2018  Mechanism of injury: Pt reports falling back in October of last year after slipping on slick floor and having knee pain afterwards. Pt reports the R knee was feeling better until about a month ago when the knee popped while making her bed causing pain in R knee. Pt reports being unable to bend knee to go down the stairs. Pt reports going down stairs is the most painful. Pt reports having Cortizone injection in the R knee which has helped a little bit. Pt reports not having tried ice but heat does not help.       Patient Occupation: [employer]  Current pain rating: 3  At best pain ratin  At worst pain ratin  Quality: throbbing, dull ache and sharp  Relieving factors: rest  Aggravating factors: stairs, ambulation and squatting (prolonged sitting)  Progression: improved    Social Support  Lives in: multiple-level home    Diagnostic Tests  X-ray: normal    Treatments  Previous treatment: physical therapy  Current treatment: injection treatment  Patient Goals  Patient goals for therapy: decreased pain, increased motion, increased strength and independence with ADLs/IADLs             Objective       Tenderness   Left Knee   No tenderness in the ITB.     Right Knee   Tenderness in the lateral joint line and lateral patella. No tenderness in the ITB.     Active Range of Motion   Left Knee   Flexion: 122 degrees   Extension: 0 degrees     Right Knee   Flexion: 120 degrees   Extension: 0 degrees     Patellar Mobility     Right Knee Hypomobile in the medial, lateral, superior and inferior patellar tendon(s).     Strength/Myotome Testing     Left Hip   Planes of Motion   Flexion: 4+  Abduction: 4    Right Hip   Planes of Motion    Flexion: 4+  Abduction: 4-    Left Knee   Flexion: 4  Extension: 5    Right Knee   Flexion: 4  Extension: 4+    Left Ankle/Foot   Dorsiflexion: 4    Right Ankle/Foot   Dorsiflexion: 4+    Tests     Left Knee   Negative anterior drawer, posterior drawer, valgus stress test at 0 degrees and varus stress test at 0 degrees.     Right Knee   Positive patellar apprehension.   Negative anterior drawer, posterior drawer, valgus stress test at 0 degrees and varus stress test at 0 degrees.          Assessment & Plan     Assessment  Impairments: abnormal or restricted ROM, activity intolerance, impaired balance, impaired physical strength, lacks appropriate home exercise program and pain with function  Assessment details: Pt is a 34 year old female that presents with R knee pain. Pt equal ROM L to R and popping in patella with flexion and extension. Pt with pain with stairs and during swing phase of gait. Pt has no pain with WB. Pt with weakness in both hips greater on the R than the L. Pt with signs and symptoms of patellar instability in R knee. Pt would benefit from PT to help address the above issues.   Prognosis: good  Prognosis details: Short Term Goals (2 weeks)  1. Pt will demonstrate independence with HEP  2. Pt will increase hip abduction strength to 4/5 in R hip  3. Pt will be able to perform Patrick taping method without assistance from PT    Long Term Goals (6 weeks)  1. Pt will be able to ambulate for 10 mins without increased pain in R knee.  2. Pt will increased hip abduction strength to 4+/5 in R hip  3. Pt will be able to ascend and descend 10 steps without pain in R knee.   Functional Limitations: walking, uncomfortable because of pain and sitting  Plan  Planned modality interventions: cryotherapy and thermotherapy (hydrocollator packs)  Planned therapy interventions: balance/weight-bearing training, body mechanics training, fine motor coordination training, flexibility, functional ROM exercises, gait  training, home exercise program, joint mobilization, transfer training, therapeutic activities, stretching, strengthening, soft tissue mobilization, prosthetic fitting/training, neuromuscular re-education and motor coordination training  Frequency: 2x week  Treatment plan discussed with: patient  Plan details: Pt to be seen 2x per week for 4-6 weeks        Manual Therapy:         mins  44792;  Therapeutic Exercise:    10     mins  54928;     Neuromuscular Tico:        mins  86413;    Therapeutic Activity:          mins  38389;     Gait Training:           mins  62372;     Ultrasound:          mins  49143;    Electrical Stimulation:         mins  07482 ( );  Dry Needling          mins self-pay    Timed Treatment:   10   mins   Total Treatment:     41   mins    PT SIGNATURE: Calvin Granger PT   DATE TREATMENT INITIATED: 3/22/2019    Initial Certification  Certification Period: 6/20/2019  I certify that the therapy services are furnished while this patient is under my care.  The services outlined above are required by this patient, and will be reviewed every 90 days.     PHYSICIAN: Sagar Zarate MD      DATE:     Please sign and return via fax to [phone].. Thank you, [facility].

## 2019-04-05 ENCOUNTER — TREATMENT (OUTPATIENT)
Dept: PHYSICAL THERAPY | Facility: CLINIC | Age: 34
End: 2019-04-05

## 2019-04-05 DIAGNOSIS — M25.361 PATELLAR INSTABILITY OF RIGHT KNEE: Primary | ICD-10-CM

## 2019-04-05 DIAGNOSIS — M25.561 ACUTE PAIN OF RIGHT KNEE: ICD-10-CM

## 2019-04-05 PROCEDURE — 97110 THERAPEUTIC EXERCISES: CPT | Performed by: PHYSICAL THERAPIST

## 2019-04-05 PROCEDURE — 97140 MANUAL THERAPY 1/> REGIONS: CPT | Performed by: PHYSICAL THERAPIST

## 2019-04-05 NOTE — PROGRESS NOTES
Physical Therapy Daily Progress Note        Vicky Maxwell reports 5/10 pain today at rest.  Pt reports that she did something to her knee several days ago getting out of a chair and states that it hurt in the back of the knee. Pt reports that the knee now hurts all over.         Objective Pt present to PT today with no distress at rest.     Pt with tightness and increased pain with hamstring stretch at first but began to have relieved pain with stretch after time. Pt may have pulled hamstring    Pt tolerated exercises well today without increased pain in R knee.       See Exercise, Manual, and Modality Logs for complete treatment.     Assessment/Plan  Pt to continue non WB exercises to help strengthen hip and knee and decrease pain. Pt would benefit from PT to help increase motor control to decrease PF pain in knee with function.       Progress per Plan of Care  Progress non WB exercise           Manual Therapy:    10     mins  79448;  Therapeutic Exercise:    27     mins  97332;     Neuromuscular Tico:        mins  88663;    Therapeutic Activity:          mins  22815;     Gait Training:        ___  mins  81456;     Ultrasound:          mins  92220;    Electrical Stimulation:         mins  15595 ( );  Dry Needling          mins self-pay    Timed Treatment:   37   mins   Total Treatment:     40   mins    Calvin Granger, PT  Physical Therapist

## 2019-04-09 ENCOUNTER — TREATMENT (OUTPATIENT)
Dept: PHYSICAL THERAPY | Facility: CLINIC | Age: 34
End: 2019-04-09

## 2019-04-09 DIAGNOSIS — M25.361 PATELLAR INSTABILITY OF RIGHT KNEE: Primary | ICD-10-CM

## 2019-04-09 PROCEDURE — 97112 NEUROMUSCULAR REEDUCATION: CPT | Performed by: PHYSICAL THERAPIST

## 2019-04-09 PROCEDURE — 97110 THERAPEUTIC EXERCISES: CPT | Performed by: PHYSICAL THERAPIST

## 2019-04-09 NOTE — PROGRESS NOTES
Physical Therapy Daily Progress Note        Vicky Maxwell reports 5/10 pain today at rest.  Pt reports being a little sore after last visit although overall has felt a little better. Pt states that her knee hurts the most when still for a long period of time like when sitting. Pt states that knee was very sore Sunday.         Objective Pt present to PT today with no distress at rest.     Pt tolerated exercises well today with some relief with stretching and mobilization activities.       See Exercise, Manual, and Modality Logs for complete treatment.     Assessment/Plan  Pt continues to have patellar femoral pain with sitting and ambulation. Pt continues to get some relief from PT exercises and would benefit from increased motor control training and strengthening to help increase activity tolerance and decrease pain.       Progress per Plan of Care  Progress as tolerated           Manual Therapy:         mins  35342;  Therapeutic Exercise:    34     mins  23351;     Neuromuscular Tico:  9      mins  40320;    Therapeutic Activity:          mins  90150;     Gait Training:        ___  mins  60667;     Ultrasound:          mins  41712;    Electrical Stimulation:         mins  60927 ( );  Dry Needling          mins self-pay    Timed Treatment:   43   mins   Total Treatment:     43   mins    Calvin Granger, PT  Physical Therapist

## 2019-04-11 ENCOUNTER — TREATMENT (OUTPATIENT)
Dept: PHYSICAL THERAPY | Facility: CLINIC | Age: 34
End: 2019-04-11

## 2019-04-11 DIAGNOSIS — M25.561 ACUTE PAIN OF RIGHT KNEE: ICD-10-CM

## 2019-04-11 DIAGNOSIS — M25.361 PATELLAR INSTABILITY OF RIGHT KNEE: Primary | ICD-10-CM

## 2019-04-11 PROCEDURE — 97110 THERAPEUTIC EXERCISES: CPT | Performed by: PHYSICAL THERAPIST

## 2019-04-11 PROCEDURE — 97112 NEUROMUSCULAR REEDUCATION: CPT | Performed by: PHYSICAL THERAPIST

## 2019-04-11 NOTE — PROGRESS NOTES
Physical Therapy Daily Progress Note        Vicky Maxwell reports 4/10 pain today at rest.  Pt reports that the back of the knee is not hurting her today like last visit but the front is worse today. Pt states that overall she is better since starting. Pt reports attributing pain in knee today from sitting playing cards last night.         Objective Pt present to PT today with no distress at rest.     Pt had some pain in the R knee with lateral walking after finishing the exercise but not during. Pt pain subsided after resting for a few minutes and ice was applied to decrease irritation.       See Exercise, Manual, and Modality Logs for complete treatment.     Assessment/Plan  Pt able to tolerated increased exercises well today with minimal irritation to knee. Pt would benefit from continued PT in order to increase motor control, strength, and activity tolerance in order to return to pain free daily activities.       Progress per Plan of Care  Progress as tolerated           Manual Therapy:         mins  16404;  Therapeutic Exercise:    40     mins  42105;     Neuromuscular Tico:    11    mins  33695;    Therapeutic Activity:          mins  28103;     Gait Training:        ___  mins  35687;     Ultrasound:          mins  99161;    Electrical Stimulation:         mins  80536 ( );  Dry Needling          mins self-pay    Timed Treatment:   51   mins   Total Treatment:     61   mins    Calvin Granger, PT  Physical Therapist

## 2019-07-09 ENCOUNTER — LAB (OUTPATIENT)
Dept: LAB | Facility: HOSPITAL | Age: 34
End: 2019-07-09

## 2019-07-09 ENCOUNTER — TRANSCRIBE ORDERS (OUTPATIENT)
Dept: OBSTETRICS AND GYNECOLOGY | Facility: CLINIC | Age: 34
End: 2019-07-09

## 2019-07-09 ENCOUNTER — OFFICE VISIT (OUTPATIENT)
Dept: OBSTETRICS AND GYNECOLOGY | Facility: CLINIC | Age: 34
End: 2019-07-09

## 2019-07-09 VITALS
BODY MASS INDEX: 48.82 KG/M2 | WEIGHT: 293 LBS | DIASTOLIC BLOOD PRESSURE: 80 MMHG | SYSTOLIC BLOOD PRESSURE: 132 MMHG | HEIGHT: 65 IN

## 2019-07-09 DIAGNOSIS — Z31.69 INFERTILITY COUNSELING: Primary | ICD-10-CM

## 2019-07-09 DIAGNOSIS — N92.6 PROBLEM OF MENSTRUATION: ICD-10-CM

## 2019-07-09 DIAGNOSIS — N92.6 IRREGULAR MENSES: ICD-10-CM

## 2019-07-09 DIAGNOSIS — Z31.69 WANTED BIRTH: Primary | ICD-10-CM

## 2019-07-09 DIAGNOSIS — Z31.69 WANTED BIRTH: ICD-10-CM

## 2019-07-09 LAB
HBA1C MFR BLD: 5.4 % (ref 3–6)
HCG INTACT+B SERPL-ACNC: <2.39 MIU/ML
TSH SERPL DL<=0.05 MIU/L-ACNC: 2.14 MIU/ML (ref 0.47–4.68)

## 2019-07-09 PROCEDURE — 84702 CHORIONIC GONADOTROPIN TEST: CPT

## 2019-07-09 PROCEDURE — 83036 HEMOGLOBIN GLYCOSYLATED A1C: CPT

## 2019-07-09 PROCEDURE — 36415 COLL VENOUS BLD VENIPUNCTURE: CPT

## 2019-07-09 PROCEDURE — 99214 OFFICE O/P EST MOD 30 MIN: CPT | Performed by: OBSTETRICS & GYNECOLOGY

## 2019-07-09 PROCEDURE — 84443 ASSAY THYROID STIM HORMONE: CPT

## 2019-07-09 PROCEDURE — 84146 ASSAY OF PROLACTIN: CPT

## 2019-07-09 NOTE — PROGRESS NOTES
Subjective   Chief Complaint   Patient presents with   • Infertility   • Menstrual Problem     Vicky Lowery is a 34 y.o. year old .  Patient's last menstrual period was 2019.  She presents to be seen because of 9-day late menses this last time.  Her cycles are usually every 21days lasting up to 5 days.  The cycles been regular for the past almost 2 years.. Trying for pregnancy for 6 + years.  Patient was an open relationship during the initial several years of trying for pregnancy off and on.  That relationship is dissolved and her ex- has fathered a child as well as the other partner since that time..  Patient's current partner has not fathered any children.  No history of PID or STDs and furthermore patient was tested about 2 months ago and was negative/normal  OTHER COMPLAINTS:  Nothing else    The following portions of the patient's history were reviewed and updated as appropriate:  She  has a past medical history of Anxiety, Anxiety, Asthma, Bipolar disorder (CMS/HCC), Depression, Mitral valve prolapse, and Tattoo.  She does not have a problem list on file.  She  has a past surgical history that includes Fibula Fracture Surgery (, ) and Mandible fracture surgery ().  Her family history includes Breast cancer in her mother; Diabetes in her maternal grandfather.  She  reports that she has never smoked. She has never used smokeless tobacco. She reports that she drinks about 0.6 oz of alcohol per week. She reports that she does not use drugs.  Current Outpatient Medications   Medication Sig Dispense Refill   • ARIPiprazole (ABILIFY) 30 MG tablet Daily.     • bisoprolol (ZEBeta) 10 MG tablet Take 10 mg by mouth Daily.  3   • buPROPion XL (WELLBUTRIN XL) 150 MG 24 hr tablet Take 150 mg by mouth Daily.  1   • buPROPion XL (WELLBUTRIN XL) 300 MG 24 hr tablet 1 tablet Daily.  2   • clonazePAM (KlonoPIN) 0.5 MG tablet TAKE 1 TABLET TWICE A DAY  0   • clonazePAM (KlonoPIN) 1 MG  tablet Take 1 mg by mouth 2 (Two) Times a Day.  1   • ibuprofen (ADVIL,MOTRIN) 800 MG tablet 3 (Three) Times a Day As Needed.  1   • lamoTRIgine (LaMICtal) 200 MG tablet Take 300 mg by mouth 2 (Two) Times a Day.     • naproxen (NAPROSYN) 500 MG tablet Take 1 tablet by mouth 2 (Two) Times a Day As Needed for Moderate Pain . 14 tablet 0   • topiramate (TOPAMAX) 100 MG tablet TAKE ONE TABLET BY MOUTH TWICE DAILY  1   • VENTOLIN  (90 BASE) MCG/ACT inhaler INHALE 2 PUFF BY INHALATION ROUTE EVERY 4 - 6 HOURS AS NEEDED  0     Current Facility-Administered Medications   Medication Dose Route Frequency Provider Last Rate Last Dose   • bupivacaine (MARCAINE) 0.5 % injection 5 mL  5 mL Injection Once Mariano Leon MD, FAAN       • bupivacaine PF (MARCAINE) 0.75 % injection 37.5 mg  5 mL Injection Once Mariano Leon MD, FAAN       • bupivacaine PF (MARCAINE) 0.75 % injection 37.5 mg  5 mL Injection Once Mariano Leon MD, FAAN       • bupivacaine PF (MARCAINE) 0.75 % injection 37.5 mg  5 mL Injection Once Mariano Leon MD, FAAN         Current Outpatient Medications on File Prior to Visit   Medication Sig   • ARIPiprazole (ABILIFY) 30 MG tablet Daily.   • bisoprolol (ZEBeta) 10 MG tablet Take 10 mg by mouth Daily.   • buPROPion XL (WELLBUTRIN XL) 150 MG 24 hr tablet Take 150 mg by mouth Daily.   • buPROPion XL (WELLBUTRIN XL) 300 MG 24 hr tablet 1 tablet Daily.   • clonazePAM (KlonoPIN) 0.5 MG tablet TAKE 1 TABLET TWICE A DAY   • clonazePAM (KlonoPIN) 1 MG tablet Take 1 mg by mouth 2 (Two) Times a Day.   • ibuprofen (ADVIL,MOTRIN) 800 MG tablet 3 (Three) Times a Day As Needed.   • lamoTRIgine (LaMICtal) 200 MG tablet Take 300 mg by mouth 2 (Two) Times a Day.   • naproxen (NAPROSYN) 500 MG tablet Take 1 tablet by mouth 2 (Two) Times a Day As Needed for Moderate Pain .   • topiramate (TOPAMAX) 100 MG tablet TAKE ONE TABLET BY MOUTH TWICE DAILY   • VENTOLIN  (90 BASE) MCG/ACT inhaler INHALE 2 PUFF BY INHALATION  "ROUTE EVERY 4 - 6 HOURS AS NEEDED     Current Facility-Administered Medications on File Prior to Visit   Medication   • bupivacaine (MARCAINE) 0.5 % injection 5 mL   • bupivacaine PF (MARCAINE) 0.75 % injection 37.5 mg   • bupivacaine PF (MARCAINE) 0.75 % injection 37.5 mg   • bupivacaine PF (MARCAINE) 0.75 % injection 37.5 mg     She has No Known Allergies.    Social History    Tobacco Use      Smoking status: Never Smoker      Smokeless tobacco: Never Used    Review of Systems  Consitutional POS: nothing reported    NEG: anorexia or night sweats   Gastointestinal POS: nothing reported    NEG: bloating, change in bowel habits, melena or reflux symptoms   Genitourinary POS: nothing reported    NEG: dysuria or hematuria   Integument POS: nothing reported    NEG: moles that are changing in size, shape, color or rashes   Breast POS: nothing reported    NEG: persistent breast lump, skin dimpling or nipple discharge         Respiratory: negative  Cardiovascular: negative          Objective   /80   Ht 165.1 cm (65\")   Wt (!) 170 kg (375 lb)   LMP 06/23/2019   BMI 62.40 kg/m²     General:  well developed; well nourished  no acute distress  obese - Body mass index is 62.4 kg/m².   Skin:  No suspicious lesions seen   Thyroid: normal to inspection and palpation   Lungs:  breathing is unlabored   Heart:  Not performed.   Breasts:  Not performed.   Abdomen: soft, non-tender; no masses  no umbilical or inguinal hernias are present  no hepato-splenomegaly   Pelvis: Not performed.     Psychiatric: Alert and oriented ×3, mood and affect appropriate  HEENT: Atraumatic, normocephalic, normal scleral icterus  Extremities: 2+ pulses bilaterally, no edema      Lab Review   No data reviewed    Imaging   No data reviewed        Assessment   1. Late menses  2. Infertility for 6+ years  3. Schizoaffective d/o   4. Tachycardia     Plan   1. TSH and hCG today.  TVS is to 3 weeks  2.            This note was electronically " signed.      July 9, 2019

## 2019-07-10 LAB — PROLACTIN SERPL-MCNC: 23.3 NG/ML (ref 4.8–23.3)

## 2019-08-06 ENCOUNTER — HOSPITAL ENCOUNTER (EMERGENCY)
Facility: HOSPITAL | Age: 34
Discharge: HOME | End: 2019-08-06
Attending: EMERGENCY MEDICINE | Admitting: EMERGENCY MEDICINE

## 2019-08-06 VITALS
TEMPERATURE: 98.6 F | SYSTOLIC BLOOD PRESSURE: 142 MMHG | OXYGEN SATURATION: 98 % | HEIGHT: 65 IN | RESPIRATION RATE: 18 BRPM | HEART RATE: 78 BPM | DIASTOLIC BLOOD PRESSURE: 83 MMHG | BODY MASS INDEX: 48.82 KG/M2 | WEIGHT: 293 LBS

## 2019-08-06 DIAGNOSIS — R42 DIZZINESS: Primary | ICD-10-CM

## 2019-08-06 LAB
ANION GAP SERPL CALCULATED.3IONS-SCNC: 14.6 MMOL/L (ref 5–15)
B-HCG UR QL: NEGATIVE
BASOPHILS # BLD AUTO: 0.04 10*3/MM3 (ref 0–0.2)
BASOPHILS NFR BLD AUTO: 0.5 % (ref 0–1.5)
BILIRUB UR QL STRIP: NEGATIVE
BUN BLD-MCNC: 15 MG/DL (ref 6–20)
BUN/CREAT SERPL: 16.1 (ref 7–25)
CALCIUM SPEC-SCNC: 9.2 MG/DL (ref 8.6–10.5)
CHLORIDE SERPL-SCNC: 101 MMOL/L (ref 98–107)
CLARITY UR: ABNORMAL
CO2 SERPL-SCNC: 27.6 MMOL/L (ref 22–29)
COLOR UR: YELLOW
CREAT BLD-MCNC: 0.93 MG/DL (ref 0.57–1)
DEPRECATED RDW RBC AUTO: 46.5 FL (ref 37–54)
EOSINOPHIL # BLD AUTO: 0.14 10*3/MM3 (ref 0–0.4)
EOSINOPHIL NFR BLD AUTO: 1.8 % (ref 0.3–6.2)
ERYTHROCYTE [DISTWIDTH] IN BLOOD BY AUTOMATED COUNT: 13.5 % (ref 12.3–15.4)
GFR SERPL CREATININE-BSD FRML MDRD: 69 ML/MIN/1.73
GLUCOSE BLD-MCNC: 104 MG/DL (ref 65–99)
GLUCOSE UR STRIP-MCNC: NEGATIVE MG/DL
HCT VFR BLD AUTO: 41 % (ref 34–46.6)
HGB BLD-MCNC: 13.1 G/DL (ref 12–15.9)
HGB UR QL STRIP.AUTO: NEGATIVE
IMM GRANULOCYTES # BLD AUTO: 0.05 10*3/MM3 (ref 0–0.05)
IMM GRANULOCYTES NFR BLD AUTO: 0.6 % (ref 0–0.5)
KETONES UR QL STRIP: NEGATIVE
LEUKOCYTE ESTERASE UR QL STRIP.AUTO: NEGATIVE
LYMPHOCYTES # BLD AUTO: 2.25 10*3/MM3 (ref 0.7–3.1)
LYMPHOCYTES NFR BLD AUTO: 28.7 % (ref 19.6–45.3)
MCH RBC QN AUTO: 29.8 PG (ref 26.6–33)
MCHC RBC AUTO-ENTMCNC: 32 G/DL (ref 31.5–35.7)
MCV RBC AUTO: 93.4 FL (ref 79–97)
MONOCYTES # BLD AUTO: 0.71 10*3/MM3 (ref 0.1–0.9)
MONOCYTES NFR BLD AUTO: 9.1 % (ref 5–12)
NEUTROPHILS # BLD AUTO: 4.65 10*3/MM3 (ref 1.7–7)
NEUTROPHILS NFR BLD AUTO: 59.3 % (ref 42.7–76)
NITRITE UR QL STRIP: NEGATIVE
NRBC BLD AUTO-RTO: 0 /100 WBC (ref 0–0.2)
PH UR STRIP.AUTO: 7.5 [PH] (ref 5–8)
PLATELET # BLD AUTO: 250 10*3/MM3 (ref 140–450)
PMV BLD AUTO: 10.6 FL (ref 6–12)
POTASSIUM BLD-SCNC: 4.2 MMOL/L (ref 3.5–5.2)
PROT UR QL STRIP: NEGATIVE
RBC # BLD AUTO: 4.39 10*6/MM3 (ref 3.77–5.28)
SODIUM BLD-SCNC: 139 MMOL/L (ref 136–145)
SP GR UR STRIP: 1.02 (ref 1–1.03)
UROBILINOGEN UR QL STRIP: ABNORMAL
WBC NRBC COR # BLD: 7.84 10*3/MM3 (ref 3.4–10.8)

## 2019-08-06 PROCEDURE — 80048 BASIC METABOLIC PNL TOTAL CA: CPT | Performed by: PHYSICIAN ASSISTANT

## 2019-08-06 PROCEDURE — 85025 COMPLETE CBC W/AUTO DIFF WBC: CPT | Performed by: PHYSICIAN ASSISTANT

## 2019-08-06 PROCEDURE — 81003 URINALYSIS AUTO W/O SCOPE: CPT | Performed by: PHYSICIAN ASSISTANT

## 2019-08-06 PROCEDURE — 81025 URINE PREGNANCY TEST: CPT | Performed by: PHYSICIAN ASSISTANT

## 2019-08-06 PROCEDURE — 99283 EMERGENCY DEPT VISIT LOW MDM: CPT

## 2019-08-06 RX ORDER — MECLIZINE HYDROCHLORIDE 25 MG/1
50 TABLET ORAL ONCE
Status: COMPLETED | OUTPATIENT
Start: 2019-08-06 | End: 2019-08-06

## 2019-08-06 RX ORDER — MECLIZINE HYDROCHLORIDE 25 MG/1
25 TABLET ORAL EVERY 6 HOURS PRN
Qty: 20 TABLET | Refills: 0 | Status: SHIPPED | OUTPATIENT
Start: 2019-08-06 | End: 2019-10-11

## 2019-08-06 RX ADMIN — MECLIZINE HYDROCHLORIDE 50 MG: 25 TABLET ORAL at 19:33

## 2019-08-06 NOTE — ED PROVIDER NOTES
Subjective   34-year-old female presents emergency room with dizziness that started last night.  She states it feels like the room is spinning around and changes.  She turns her head left and right.  She has nausea but no vomiting.  She denies fevers or chills.  She denies any diarrhea constipation.  She denies any ear pain or sore throat.  She is unsure if she could be pregnant.  She has been trying.  She does not use birth control.  She has a history of anxiety, asthma, bipolar disorder, depression, mitral valve prolapse.  She does not smoke cigarettes but she does use alcohol occasionally.  Is not allergic to any medications.  She takes mainly psychiatric drugs.  She has not a diabetic.  She denies chest pain or shortness of breath.  She has no other complaints at this time.            Review of Systems   Constitutional: Positive for activity change. Negative for appetite change, chills, fatigue and fever.   HENT: Negative for dental problem, ear discharge, ear pain, sinus pressure, sinus pain, sore throat, tinnitus and trouble swallowing.    Eyes: Negative for photophobia, pain and discharge.   Respiratory: Negative for chest tightness, shortness of breath and wheezing.    Cardiovascular: Negative for chest pain and leg swelling.   Gastrointestinal: Positive for nausea. Negative for abdominal pain, diarrhea and vomiting.   Genitourinary: Negative for decreased urine volume, difficulty urinating, dysuria, frequency and urgency.   Musculoskeletal: Negative for arthralgias, back pain, gait problem and joint swelling.   Skin: Negative for color change, rash and wound.   Neurological: Positive for dizziness. Negative for syncope, weakness, light-headedness and headaches.   Hematological: Does not bruise/bleed easily.       Past Medical History:   Diagnosis Date   • Anxiety    • Anxiety    • Asthma    • Bipolar disorder (CMS/[facility])    • Depression    • Mitral valve prolapse    • Tattoo        No Known Allergies    Past  Surgical History:   Procedure Laterality Date   • FIBULA FRACTURE SURGERY  2014, 2015   • MANDIBLE FRACTURE SURGERY  2002       Family History   Problem Relation Age of Onset   • Breast cancer Mother    • Diabetes Maternal Grandfather    • Colon cancer Neg Hx        Social History     Socioeconomic History   • Marital status:      Spouse name: Not on file   • Number of children: Not on file   • Years of education: Not on file   • Highest education level: Not on file   Tobacco Use   • Smoking status: Never Smoker   • Smokeless tobacco: Never Used   Substance and Sexual Activity   • Alcohol use: Yes     Alcohol/week: 0.6 oz     Types: 1 Glasses of wine per week     Comment: social   • Drug use: No   • Sexual activity: Yes     Partners: Male     Birth control/protection: None           Objective   Physical Exam   Constitutional: She is oriented to person, place, and time. Vital signs are normal. She appears well-developed and well-nourished. She is cooperative.  Non-toxic appearance. She does not have a sickly appearance. She does not appear ill. No distress.   HENT:   Head: Normocephalic and atraumatic.   Right Ear: Hearing, tympanic membrane, external ear and ear canal normal.   Left Ear: Hearing, tympanic membrane, external ear and ear canal normal.   Nose: Nose normal. No mucosal edema.   Mouth/Throat: Uvula is midline, oropharynx is clear and moist and mucous membranes are normal.   Neck: Normal range of motion. No neck rigidity.   Cardiovascular: Normal rate, regular rhythm, intact distal pulses and normal pulses.   Pulmonary/Chest: Effort normal and breath sounds normal. No respiratory distress.   Abdominal: Soft.   Musculoskeletal: Normal range of motion. She exhibits no edema or deformity.   Neurological: She is alert and oriented to person, place, and time. She has normal strength. She is not disoriented. Gait normal. GCS eye subscore is 4. GCS verbal subscore is 5. GCS motor subscore is 6.   Skin:  Skin is warm, dry and intact. Capillary refill takes less than 2 seconds. No rash noted.   Psychiatric: She has a normal mood and affect. Her behavior is normal. Judgment normal.   Nursing note and vitals reviewed.      Procedures        Lab Results (last 24 hours)     Procedure Component Value Units Date/Time    Pregnancy, Urine - Urine, Clean Catch [[id number]]  (Normal) Collected:  08/06/19 1933    Specimen:  Urine, Clean Catch Updated:  08/06/19 1942     HCG, Urine QL Negative    Urinalysis With Culture If Indicated - Urine, Clean Catch [[id number]]  (Abnormal) Collected:  08/06/19 1933    Specimen:  Urine, Clean Catch Updated:  08/06/19 1941     Color, UA Yellow     Appearance, UA Cloudy     pH, UA 7.5     Specific Gravity, UA 1.018     Glucose, UA Negative     Ketones, UA Negative     Bilirubin, UA Negative     Blood, UA Negative     Protein, UA Negative     Leuk Esterase, UA Negative     Nitrite, UA Negative     Urobilinogen, UA 0.2 E.U./dL    Narrative:       Urine microscopic not indicated.    CBC & Differential [[id number]] Collected:  08/06/19 2000    Specimen:  Blood Updated:  08/06/19 2009    Narrative:       The following orders were created for panel order CBC & Differential.  Procedure                               Abnormality         Status                     ---------                               -----------         ------                     CBC Auto Differential[[id number]]        Abnormal            Final result                 Please view results for these tests on the individual orders.    Basic Metabolic Panel [[id number]] Collected:  08/06/19 2000    Specimen:  Blood Updated:  08/06/19 2005    CBC Auto Differential [[id number]]  (Abnormal) Collected:  08/06/19 2000    Specimen:  Blood Updated:  08/06/19 2009     WBC 7.84 10*3/mm3      RBC 4.39 10*6/mm3      Hemoglobin 13.1 g/dL      Hematocrit 41.0 %      MCV 93.4 fL      MCH 29.8 pg      MCHC 32.0 g/dL      RDW 13.5 %      RDW-SD 46.5 fl       MPV 10.6 fL      Platelets 250 10*3/mm3      Neutrophil % 59.3 %      Lymphocyte % 28.7 %      Monocyte % 9.1 %      Eosinophil % 1.8 %      Basophil % 0.5 %      Immature Grans % 0.6 %      Neutrophils, Absolute 4.65 10*3/mm3      Lymphocytes, Absolute 2.25 10*3/mm3      Monocytes, Absolute 0.71 10*3/mm3      Eosinophils, Absolute 0.14 10*3/mm3      Basophils, Absolute 0.04 10*3/mm3      Immature Grans, Absolute 0.05 10*3/mm3      nRBC 0.0 /100 WBC               ED Course      8:20 PM-everything looks good so far.  I will discharge the patient home.    I have reviewed all labs, and all imaging that has been ordered for this patient.  I have discussed the results of this testing with the patient.  Together the patient, their family and I discussed the plan for treatment and disposition.    Red flags, indicating immediate need to return to the emergency room, were discussed with the patient and/or the patient's family and they verbalized understanding.  The patient and/or the family were encouraged to return to the emergency room for any worsening or concerning symptoms.      .          MDM  Number of Diagnoses or Management Options  Dizziness: new and requires workup     Amount and/or Complexity of Data Reviewed  Clinical lab tests: reviewed and ordered    Risk of Complications, Morbidity, and/or Mortality  Presenting problems: low  Diagnostic procedures: low  Management options: low    Patient Progress  Patient progress: improved        Final diagnoses:   Dizziness            Monica Sykes PA-C  08/06/19 2023

## 2019-08-08 ENCOUNTER — OFFICE VISIT (OUTPATIENT)
Dept: ORTHOPEDIC SURGERY | Facility: CLINIC | Age: 34
End: 2019-08-08

## 2019-08-08 VITALS — HEIGHT: 65 IN | RESPIRATION RATE: 18 BRPM | BODY MASS INDEX: 48.82 KG/M2 | WEIGHT: 293 LBS

## 2019-08-08 DIAGNOSIS — M17.10 ARTHRITIS OF KNEE: Primary | ICD-10-CM

## 2019-08-08 PROCEDURE — 99213 OFFICE O/P EST LOW 20 MIN: CPT | Performed by: ORTHOPAEDIC SURGERY

## 2019-08-08 NOTE — PROGRESS NOTES
Subjective   Patient ID: Vicky Lowery is a 34 y.o. female  Follow-up and Pain of the Right Knee (Patient states her knee is doing much better but states inclines and stairs are still painful.)             History of Present Illness  50% improvement in right lateral knee pain after her last injection, still has pain with squatting stairs getting out of the chair, no fall or injury no posterior knee pain no swelling in the ankle no instability symptoms at the knee, minimal medial joint line pain.      Review of Systems   Constitutional: Negative for fever.   HENT: Negative for voice change.    Eyes: Negative for visual disturbance.   Respiratory: Negative for shortness of breath.    Cardiovascular: Negative for chest pain.   Gastrointestinal: Negative for abdominal pain.   Genitourinary: Negative for dysuria.   Musculoskeletal: Positive for arthralgias. Negative for gait problem and joint swelling.   Skin: Negative for rash.   Neurological: Negative for speech difficulty.   Hematological: Does not bruise/bleed easily.   Psychiatric/Behavioral: Negative for confusion.       Past Medical History:   Diagnosis Date   • Anxiety    • Anxiety    • Asthma    • Bipolar disorder (CMS/HCC)    • Depression    • Mitral valve prolapse    • Tattoo         Past Surgical History:   Procedure Laterality Date   • FIBULA FRACTURE SURGERY  2014, 2015   • MANDIBLE FRACTURE SURGERY  2002       Family History   Problem Relation Age of Onset   • Breast cancer Mother    • Diabetes Maternal Grandfather    • Colon cancer Neg Hx        Social History     Socioeconomic History   • Marital status:      Spouse name: Not on file   • Number of children: Not on file   • Years of education: Not on file   • Highest education level: Not on file   Tobacco Use   • Smoking status: Never Smoker   • Smokeless tobacco: Never Used   Substance and Sexual Activity   • Alcohol use: Yes     Alcohol/week: 0.6 oz     Types: 1 Glasses of wine per week      Comment: social   • Drug use: No   • Sexual activity: Yes     Partners: Male     Birth control/protection: None       I have reviewed all of the above social hx, family hx, surgical hx, medications, allergies & ROS and confirm that it is accurate.    No Known Allergies      Current Outpatient Medications:   •  ARIPiprazole (ABILIFY) 30 MG tablet, Daily., Disp: , Rfl:   •  bisoprolol (ZEBeta) 10 MG tablet, Take 10 mg by mouth Daily., Disp: , Rfl: 3  •  buPROPion XL (WELLBUTRIN XL) 150 MG 24 hr tablet, Take 150 mg by mouth Daily., Disp: , Rfl: 1  •  buPROPion XL (WELLBUTRIN XL) 300 MG 24 hr tablet, 1 tablet Daily., Disp: , Rfl: 2  •  clonazePAM (KlonoPIN) 0.5 MG tablet, TAKE 1 TABLET TWICE A DAY, Disp: , Rfl: 0  •  clonazePAM (KlonoPIN) 1 MG tablet, Take 1 mg by mouth 2 (Two) Times a Day., Disp: , Rfl: 1  •  ibuprofen (ADVIL,MOTRIN) 800 MG tablet, 3 (Three) Times a Day As Needed., Disp: , Rfl: 1  •  lamoTRIgine (LaMICtal) 200 MG tablet, Take 300 mg by mouth 2 (Two) Times a Day., Disp: , Rfl:   •  meclizine (ANTIVERT) 25 MG tablet, Take 1 tablet by mouth Every 6 (Six) Hours As Needed for dizziness., Disp: 20 tablet, Rfl: 0  •  naproxen (NAPROSYN) 500 MG tablet, Take 1 tablet by mouth 2 (Two) Times a Day As Needed for Moderate Pain ., Disp: 14 tablet, Rfl: 0  •  topiramate (TOPAMAX) 100 MG tablet, TAKE ONE TABLET BY MOUTH TWICE DAILY, Disp: , Rfl: 1  •  VENTOLIN  (90 BASE) MCG/ACT inhaler, INHALE 2 PUFF BY INHALATION ROUTE EVERY 4 - 6 HOURS AS NEEDED, Disp: , Rfl: 0    Current Facility-Administered Medications:   •  bupivacaine (MARCAINE) 0.5 % injection 5 mL, 5 mL, Injection, Once, Mariano Leon MD, FAAN  •  bupivacaine PF (MARCAINE) 0.75 % injection 37.5 mg, 5 mL, Injection, Once, Mariano Leon MD, FAAN  •  bupivacaine PF (MARCAINE) 0.75 % injection 37.5 mg, 5 mL, Injection, Once, Mariano Leon MD, FAAN  •  bupivacaine PF (MARCAINE) 0.75 % injection 37.5 mg, 5 mL, Injection, Once, Mariano Leon MD,  "FAAN    Objective   Resp 18   Ht 165.1 cm (65\")   Wt (!) 170 kg (375 lb)   BMI 62.40 kg/m²    Physical Exam  Constitutional: Patient is oriented to person, place, and time. Patient appears well-developed and well-nourished.   HENT:Head: Normocephalic and atraumatic.   Eyes: EOM are normal. Pupils are equal, round, and reactive to light.   Neck: Normal range of motion. Neck supple.   Cardiovascular: Normal rate.    Pulmonary/Chest: Effort normal and breath sounds normal.   Abdominal: Soft.   Neurological: Patient is alert and oriented to person, place, and time.   Skin: Skin is warm and dry.   Psychiatric: Patient has a normal mood and affect.   Nursing note and vitals reviewed.       [unfilled]   Right knee: Positive patellofemoral crepitus positive crepitus consistent with plica at the lateral patellofemoral compartment, minimal lateral patellar pressure, medial lateral joint line nontender full extension no effusion flexion full calf supple no change in appearance of her chronic varicosities and chronic venous stasis of right lower leg.    Assessment/Plan   Review of Radiographic Studies:    No new imaging done today.      Procedures     Vicky was seen today for follow-up and pain.    Diagnoses and all orders for this visit:    Arthritis of knee       Orthopedic activities reviewed and patient expressed appreciation and Using illustrations and models, the nature of the pathology was explained to the patient      Recommendations/Plan:   Work/Activity Status: May perform usual activities as tolerated    Patient agreeable to call or return sooner for any concerns.       I reviewed the patient's radiographs indicating advanced osteoarthritis discussed the natural history treatment options and pros and cons and risks and complications of surgical and nonsurgical care.  I also reviewed advantages and disadvantages risks and complications of steroid injections versus viscus gel injections.  The patient had " opportunity to ask questions which were answered.      Impression:  Chronic lateral right anterior knee pain patellofemoral in nature improved in the past with steroid injections currently without meniscal symptoms or instability  Plan:  Return as needed for repeat steroid injection, weight loss, pool therapy

## 2019-08-28 ENCOUNTER — OFFICE VISIT (OUTPATIENT)
Dept: NEUROLOGY | Facility: CLINIC | Age: 34
End: 2019-08-28

## 2019-08-28 VITALS
DIASTOLIC BLOOD PRESSURE: 82 MMHG | SYSTOLIC BLOOD PRESSURE: 132 MMHG | BODY MASS INDEX: 64.23 KG/M2 | TEMPERATURE: 99 F | OXYGEN SATURATION: 98 % | HEART RATE: 96 BPM | WEIGHT: 293 LBS

## 2019-08-28 DIAGNOSIS — M51.9 LUMBAR DISC DISEASE: ICD-10-CM

## 2019-08-28 DIAGNOSIS — M54.16 LUMBAR BACK PAIN WITH RADICULOPATHY AFFECTING RIGHT LOWER EXTREMITY: Primary | ICD-10-CM

## 2019-08-28 PROCEDURE — 99213 OFFICE O/P EST LOW 20 MIN: CPT | Performed by: NURSE PRACTITIONER

## 2019-09-10 ENCOUNTER — OFFICE VISIT (OUTPATIENT)
Dept: OBSTETRICS AND GYNECOLOGY | Facility: CLINIC | Age: 34
End: 2019-09-10

## 2019-09-10 VITALS — DIASTOLIC BLOOD PRESSURE: 74 MMHG | SYSTOLIC BLOOD PRESSURE: 130 MMHG | WEIGHT: 293 LBS | BODY MASS INDEX: 65.07 KG/M2

## 2019-09-10 DIAGNOSIS — Z31.9 INFERTILITY MANAGEMENT: Primary | ICD-10-CM

## 2019-09-10 PROCEDURE — 99213 OFFICE O/P EST LOW 20 MIN: CPT | Performed by: OBSTETRICS & GYNECOLOGY

## 2019-09-10 NOTE — PROGRESS NOTES
Subjective   Chief Complaint   Patient presents with   • Follow-up     TVS- infertility counseling      Vicky Lowery is a 34 y.o. year old .  Patient's last menstrual period was 2019.  She presents to be seen because of infetrility-- + moliminal symptoms  Pregnacy loss 15 years ago.  Multiple pyschotropics.     OTHER COMPLAINTS:  Nothing else    The following portions of the patient's history were reviewed and updated as appropriate:current medications and allergies    Social History    Tobacco Use      Smoking status: Never Smoker      Smokeless tobacco: Never Used    Review of Systems  Consitutional POS: nothing reported    NEG: anorexia or night sweats   Gastointestinal POS: nothing reported    NEG: bloating, change in bowel habits, melena or reflux symptoms   Genitourinary POS: nothing reported    NEG: dysuria or hematuria   Integument POS: nothing reported    NEG: moles that are changing in size, shape, color or rashes   Breast POS: nothing reported    NEG: persistent breast lump, skin dimpling or nipple discharge         Pertinent items are noted in HPI.          Objective   /74   Wt (!) 177 kg (391 lb)   LMP 2019   BMI 65.07 kg/m²     General:  well developed; well nourished  no acute distress  obese - Body mass index is 65.07 kg/m².   Skin:  No suspicious lesions seen   Thyroid: normal to inspection and palpation   Lungs:  breathing is unlabored   Heart:  Not performed.   Breasts:  Not performed.   Abdomen: soft, non-tender; no masses  no umbilical or inguinal hernias are present  no hepato-splenomegaly   Pelvis: Not performed.     Psychiatric: Alert and oriented ×3, mood and affect appropriate  HEENT: Atraumatic, normocephalic, normal scleral icterus  Extremities: 2+ pulses bilaterally, no edema      Lab Review   CBC, CMP and TSH    Imaging   Pelvic ultrasound images independantly reviewed - Uterus is 7.7 x 5.2 x 4 cm.  The endometrium is 7.4 mm.  Normal adnexa.  simple  1.8cm simple cyst.  No masses.  No free fluid.        Assessment   1. Infertility: normal TVS and labs and regular menses.      Plan   1. Discussed taling with [facility] PCP re meds.  Would not recommend patient being on these medicines during the first trimester.  Patient is to do timed intercourse for the next 4 to 6 months.  Day 21 progesterone's have been ordered.  2.     No orders of the defined types were placed in this encounter.         This note was electronically signed.      September 10, 2019

## 2019-10-04 ENCOUNTER — LAB (OUTPATIENT)
Dept: LAB | Facility: HOSPITAL | Age: 34
End: 2019-10-04

## 2019-10-04 DIAGNOSIS — Z31.9 INFERTILITY MANAGEMENT: ICD-10-CM

## 2019-10-04 DIAGNOSIS — Z31.9 INFERTILITY MANAGEMENT: Primary | ICD-10-CM

## 2019-10-04 LAB — PROGEST SERPL-MCNC: 2.21 NG/ML

## 2019-10-04 PROCEDURE — 36415 COLL VENOUS BLD VENIPUNCTURE: CPT

## 2019-10-04 PROCEDURE — 84144 ASSAY OF PROGESTERONE: CPT

## 2019-10-11 ENCOUNTER — HOSPITAL ENCOUNTER (OUTPATIENT)
Dept: GENERAL RADIOLOGY | Facility: HOSPITAL | Age: 34
Discharge: HOME | End: 2019-10-11
Admitting: FAMILY MEDICINE

## 2019-10-11 ENCOUNTER — OFFICE VISIT (OUTPATIENT)
Dept: INTERNAL MEDICINE | Facility: CLINIC | Age: 34
End: 2019-10-11

## 2019-10-11 VITALS
OXYGEN SATURATION: 98 % | RESPIRATION RATE: 16 BRPM | DIASTOLIC BLOOD PRESSURE: 70 MMHG | SYSTOLIC BLOOD PRESSURE: 128 MMHG | BODY MASS INDEX: 48.82 KG/M2 | HEART RATE: 83 BPM | HEIGHT: 65 IN | TEMPERATURE: 97.7 F | WEIGHT: 293 LBS

## 2019-10-11 DIAGNOSIS — M54.9 UPPER BACK PAIN ON LEFT SIDE: Primary | ICD-10-CM

## 2019-10-11 DIAGNOSIS — R07.9 CHEST PAIN, UNSPECIFIED TYPE: ICD-10-CM

## 2019-10-11 PROCEDURE — 99203 OFFICE O/P NEW LOW 30 MIN: CPT | Performed by: FAMILY MEDICINE

## 2019-10-11 PROCEDURE — 71046 X-RAY EXAM CHEST 2 VIEWS: CPT

## 2019-10-11 PROCEDURE — 72072 X-RAY EXAM THORAC SPINE 3VWS: CPT

## 2019-10-11 RX ORDER — LAMOTRIGINE 25 MG/1
25 TABLET ORAL DAILY
COMMUNITY
Start: 2019-09-09 | End: 2020-01-23

## 2019-10-11 RX ORDER — TIZANIDINE 4 MG/1
4 TABLET ORAL DAILY
Refills: 0 | COMMUNITY
Start: 2019-10-08 | End: 2020-01-23

## 2019-10-11 RX ORDER — LIDOCAINE 50 MG/G
1 PATCH TOPICAL EVERY 24 HOURS
Qty: 30 EACH | Refills: 0 | Status: SHIPPED | OUTPATIENT
Start: 2019-10-11 | End: 2020-01-23

## 2019-10-11 NOTE — PROGRESS NOTES
Vicky Lowery is a 34 y.o. female.    Chief Complaint   Patient presents with   • Back Pain     Patient states she has noticed upper left back pain, patient states it feels like a pressure sensation, and sometimes she has left sided chest pain with it. Patient states she has noticed the pain now for about a week, and the pain and pressure comes and goes. Patient is establishing care, and has been seen at [facility] previously. Patient is prescribed several medications but is not taking anything currently due to her trying ot get pregnant.       HPI   Patient has been complaining of back - left pain for 1 week. Pain is a 9 on a scale of 0-10 at it's worst. Pain is ache, sharp and pressure. Pain radiates to chest. Pain is worse with nothing, better with leaning forward and laying down. Symptoms are unchanged.  Patient has tried muscle relaxants with no relief.    The following portions of the patient's history were reviewed and updated as appropriate: allergies, current medications, past family history, past medical history, past social history, past surgical history and problem list.     Past Medical History:   Diagnosis Date   • Anxiety    • Anxiety    • Asthma    • Bipolar disorder (CMS/HCC)    • Depression    • Mitral valve prolapse    • Tattoo        Past Surgical History:   Procedure Laterality Date   • FIBULA FRACTURE SURGERY  2014, 2015   • MANDIBLE FRACTURE SURGERY  2002   • WISDOM TOOTH EXTRACTION         Family History   Problem Relation Age of Onset   • Breast cancer Mother    • Arthritis Mother    • Diabetes Maternal Grandfather    • Hypertension Father    • Migraines Father    • Arthritis Maternal Grandmother    • Colon cancer Neg Hx        Social History     Socioeconomic History   • Marital status:      Spouse name: Not on file   • Number of children: Not on file   • Years of education: Not on file   • Highest education level: Not on file   Tobacco Use   • Smoking status: Never  Smoker   • Smokeless tobacco: Never Used   Substance and Sexual Activity   • Alcohol use: Yes     Alcohol/week: 0.6 oz     Types: 1 Glasses of wine per week     Comment: social   • Drug use: No   • Sexual activity: Yes     Partners: Male     Birth control/protection: None       No Known Allergies      Current Outpatient Medications:   •  ARIPiprazole (ABILIFY) 30 MG tablet, Daily., Disp: , Rfl:   •  bisoprolol (ZEBeta) 10 MG tablet, Take 10 mg by mouth Daily., Disp: , Rfl: 3  •  lamoTRIgine (LaMICtal) 25 MG tablet, Take 25 mg by mouth Daily., Disp: , Rfl:   •  lidocaine (LIDODERM) 5 %, Place 1 patch on the skin as directed by provider Daily. Remove & Discard patch within 12 hours or as directed by MD, Disp: 30 each, Rfl: 0  •  tiZANidine (ZANAFLEX) 4 MG tablet, Take 4 mg by mouth Daily., Disp: , Rfl: 0  •  VENTOLIN  (90 BASE) MCG/ACT inhaler, INHALE 2 PUFF BY INHALATION ROUTE EVERY 4 - 6 HOURS AS NEEDED, Disp: , Rfl: 0    ROS    Review of Systems   Constitutional: Negative for chills, fatigue and fever.   HENT: Negative for congestion, postnasal drip and sore throat.    Eyes: Positive for blurred vision and visual disturbance.   Respiratory: Positive for shortness of breath (on exertion). Negative for cough.    Cardiovascular: Positive for chest pain. Negative for leg swelling.   Gastrointestinal: Positive for nausea. Negative for abdominal pain, constipation, diarrhea and vomiting.   Endocrine: Negative for cold intolerance and heat intolerance.   Musculoskeletal: Positive for back pain. Negative for arthralgias.   Skin: Negative for color change and rash.   Allergic/Immunologic: Negative for environmental allergies.   Neurological: Positive for headache. Negative for weakness.   Hematological: Does not bruise/bleed easily.   Psychiatric/Behavioral: Negative for depressed mood. The patient is not nervous/anxious.        Vitals:    10/11/19 1022   BP: 128/70   Pulse: 83   Resp: 16   Temp: 97.7 °F (36.5 °C)  "  SpO2: 98%   Weight: (!) 177 kg (391 lb)   Height: 165.1 cm (65\")   PainSc: 0-No pain  Comment: PAtient states currently in no pain, but when the pain does come it's a 6.     Body mass index is 65.07 kg/m².    Physical Exam     Physical Exam   Constitutional: She is oriented to person, place, and time. She appears well-developed and well-nourished. No distress.   HENT:   Head: Normocephalic and atraumatic.   Right Ear: External ear normal.   Left Ear: External ear normal.   Eyes: Conjunctivae and EOM are normal.   Neck: Normal range of motion. Neck supple.   Cardiovascular: Normal rate and regular rhythm.   No murmur heard.  Pulmonary/Chest: Effort normal and breath sounds normal. No respiratory distress. She has no wheezes.   Abdominal: Soft. Bowel sounds are normal. She exhibits no distension. There is no tenderness.   Musculoskeletal: She exhibits no edema or deformity.   Lymphadenopathy:     She has no cervical adenopathy.   Neurological: She is alert and oriented to person, place, and time. No cranial nerve deficit.   Skin: Skin is warm and dry.   Psychiatric: She has a normal mood and affect. Her behavior is normal.       Assessment/Plan    ECG 12 Lead  Date/Time: 10/17/2019 5:47 PM  Performed by: Naz Unger DO  Authorized by: Naz Unger DO   Comparison: not compared with previous ECG   Previous ECG: no previous ECG available  Rhythm: sinus rhythm  Rate: normal  Conduction: conduction normal  ST Segments: ST segments normal  T Waves: T waves normal  QRS axis: normal  Other: no other findings    Clinical impression: normal ECG            Problem List Items Addressed This Visit     None      Visit Diagnoses     Upper back pain on left side    -  Primary    Relevant Orders    XR Spine Thoracic 3 View (Completed)    Chest pain, unspecified type        Relevant Orders    XR Chest PA & Lateral (Completed)    CBC & Differential (Completed)    Comprehensive Metabolic Panel (Completed)    TSH " (Completed)    CK (Completed)        Patient's EKG was unremarkable.  With her description of the pain there was initial concern for pericarditis.  However, this is not of much concern any longer.  Will obtain x-rays and blood work for further evaluation.  Patient encouraged to try lidocaine patches for pain relief.     New Medications Ordered This Visit   Medications   • lidocaine (LIDODERM) 5 %     Sig: Place 1 patch on the skin as directed by provider Daily. Remove & Discard patch within 12 hours or as directed by MD     Dispense:  30 each     Refill:  0       No orders of the defined types were placed in this encounter.      No Follow-up on file.      Naz Unger DO

## 2019-10-12 LAB
ALBUMIN SERPL-MCNC: 4.2 G/DL (ref 3.5–5.2)
ALBUMIN/GLOB SERPL: 1.6 G/DL
ALP SERPL-CCNC: 78 U/L (ref 39–117)
ALT SERPL-CCNC: 19 U/L (ref 1–33)
AST SERPL-CCNC: 18 U/L (ref 1–32)
BASOPHILS # BLD AUTO: 0.04 10*3/MM3 (ref 0–0.2)
BASOPHILS NFR BLD AUTO: 0.7 % (ref 0–1.5)
BILIRUB SERPL-MCNC: 0.4 MG/DL (ref 0.2–1.2)
BUN SERPL-MCNC: 13 MG/DL (ref 6–20)
BUN/CREAT SERPL: 13 (ref 7–25)
CALCIUM SERPL-MCNC: 8.9 MG/DL (ref 8.6–10.5)
CHLORIDE SERPL-SCNC: 105 MMOL/L (ref 98–107)
CK SERPL-CCNC: 87 U/L (ref 20–180)
CO2 SERPL-SCNC: 24 MMOL/L (ref 22–29)
CREAT SERPL-MCNC: 1 MG/DL (ref 0.57–1)
EOSINOPHIL # BLD AUTO: 0.17 10*3/MM3 (ref 0–0.4)
EOSINOPHIL NFR BLD AUTO: 2.8 % (ref 0.3–6.2)
ERYTHROCYTE [DISTWIDTH] IN BLOOD BY AUTOMATED COUNT: 12.8 % (ref 12.3–15.4)
GLOBULIN SER CALC-MCNC: 2.7 GM/DL
GLUCOSE SERPL-MCNC: 89 MG/DL (ref 65–99)
HCT VFR BLD AUTO: 40 % (ref 34–46.6)
HGB BLD-MCNC: 13.4 G/DL (ref 12–15.9)
IMM GRANULOCYTES # BLD AUTO: 0.01 10*3/MM3 (ref 0–0.05)
IMM GRANULOCYTES NFR BLD AUTO: 0.2 % (ref 0–0.5)
LYMPHOCYTES # BLD AUTO: 1.63 10*3/MM3 (ref 0.7–3.1)
LYMPHOCYTES NFR BLD AUTO: 26.9 % (ref 19.6–45.3)
MCH RBC QN AUTO: 29.8 PG (ref 26.6–33)
MCHC RBC AUTO-ENTMCNC: 33.5 G/DL (ref 31.5–35.7)
MCV RBC AUTO: 88.9 FL (ref 79–97)
MONOCYTES # BLD AUTO: 0.83 10*3/MM3 (ref 0.1–0.9)
MONOCYTES NFR BLD AUTO: 13.7 % (ref 5–12)
NEUTROPHILS # BLD AUTO: 3.39 10*3/MM3 (ref 1.7–7)
NEUTROPHILS NFR BLD AUTO: 55.7 % (ref 42.7–76)
NRBC BLD AUTO-RTO: 0 /100 WBC (ref 0–0.2)
PLATELET # BLD AUTO: 244 10*3/MM3 (ref 140–450)
POTASSIUM SERPL-SCNC: 4.3 MMOL/L (ref 3.5–5.2)
PROT SERPL-MCNC: 6.9 G/DL (ref 6–8.5)
RBC # BLD AUTO: 4.5 10*6/MM3 (ref 3.77–5.28)
SODIUM SERPL-SCNC: 142 MMOL/L (ref 136–145)
TSH SERPL DL<=0.005 MIU/L-ACNC: 1.99 UIU/ML (ref 0.27–4.2)
WBC # BLD AUTO: 6.07 10*3/MM3 (ref 3.4–10.8)

## 2019-10-17 PROCEDURE — 93000 ELECTROCARDIOGRAM COMPLETE: CPT | Performed by: FAMILY MEDICINE

## 2019-10-18 ENCOUNTER — HOSPITAL ENCOUNTER (OUTPATIENT)
Dept: GENERAL RADIOLOGY | Facility: HOSPITAL | Age: 34
Discharge: HOME | End: 2019-10-18
Admitting: NURSE PRACTITIONER

## 2019-10-18 ENCOUNTER — TRANSCRIBE ORDERS (OUTPATIENT)
Dept: GENERAL RADIOLOGY | Facility: HOSPITAL | Age: 34
End: 2019-10-18

## 2019-10-18 DIAGNOSIS — M54.9 UPPER BACK PAIN ON LEFT SIDE: Primary | ICD-10-CM

## 2019-10-18 DIAGNOSIS — M54.9 UPPER BACK PAIN ON LEFT SIDE: ICD-10-CM

## 2019-10-18 PROCEDURE — 72070 X-RAY EXAM THORAC SPINE 2VWS: CPT

## 2019-10-18 PROCEDURE — 73030 X-RAY EXAM OF SHOULDER: CPT

## 2019-10-22 ENCOUNTER — OFFICE VISIT (OUTPATIENT)
Dept: OBSTETRICS AND GYNECOLOGY | Facility: CLINIC | Age: 34
End: 2019-10-22

## 2019-10-22 VITALS
HEIGHT: 65 IN | SYSTOLIC BLOOD PRESSURE: 130 MMHG | DIASTOLIC BLOOD PRESSURE: 70 MMHG | WEIGHT: 293 LBS | BODY MASS INDEX: 48.82 KG/M2

## 2019-10-22 DIAGNOSIS — N97.0 INFERTILITY ASSOCIATED WITH ANOVULATION: Primary | ICD-10-CM

## 2019-10-22 PROCEDURE — 99213 OFFICE O/P EST LOW 20 MIN: CPT | Performed by: OBSTETRICS & GYNECOLOGY

## 2019-10-31 LAB — PROGEST SERPL-MCNC: 5.9 NG/ML

## 2019-11-25 ENCOUNTER — RESULTS ENCOUNTER (OUTPATIENT)
Dept: OBSTETRICS AND GYNECOLOGY | Facility: CLINIC | Age: 34
End: 2019-11-25

## 2019-11-25 DIAGNOSIS — Z31.9 INFERTILITY MANAGEMENT: ICD-10-CM

## 2019-11-25 DIAGNOSIS — Z31.9 INFERTILITY MANAGEMENT: Primary | ICD-10-CM

## 2019-11-29 ENCOUNTER — LAB (OUTPATIENT)
Dept: LAB | Facility: HOSPITAL | Age: 34
End: 2019-11-29

## 2019-11-29 DIAGNOSIS — Z31.9 INFERTILITY MANAGEMENT: Primary | ICD-10-CM

## 2019-11-29 LAB — PROGEST SERPL-MCNC: 5.92 NG/ML

## 2019-11-29 PROCEDURE — 84144 ASSAY OF PROGESTERONE: CPT

## 2019-11-29 PROCEDURE — 36415 COLL VENOUS BLD VENIPUNCTURE: CPT

## 2019-12-30 ENCOUNTER — LAB (OUTPATIENT)
Dept: LAB | Facility: HOSPITAL | Age: 34
End: 2019-12-30

## 2019-12-30 DIAGNOSIS — Z31.9 INFERTILITY MANAGEMENT: ICD-10-CM

## 2019-12-30 DIAGNOSIS — Z31.9 INFERTILITY MANAGEMENT: Primary | ICD-10-CM

## 2019-12-30 LAB — PROGEST SERPL-MCNC: 10.8 NG/ML

## 2019-12-30 PROCEDURE — 84144 ASSAY OF PROGESTERONE: CPT

## 2020-01-23 ENCOUNTER — PROCEDURE VISIT (OUTPATIENT)
Dept: NEUROLOGY | Facility: CLINIC | Age: 35
End: 2020-01-23

## 2020-01-23 VITALS
BODY MASS INDEX: 66.23 KG/M2 | TEMPERATURE: 98.8 F | SYSTOLIC BLOOD PRESSURE: 110 MMHG | OXYGEN SATURATION: 96 % | DIASTOLIC BLOOD PRESSURE: 68 MMHG | WEIGHT: 293 LBS | HEART RATE: 116 BPM

## 2020-01-23 DIAGNOSIS — M51.9 LUMBAR DISC DISEASE: Primary | ICD-10-CM

## 2020-01-23 DIAGNOSIS — M54.2 CERVICALGIA: ICD-10-CM

## 2020-01-23 PROCEDURE — 20553 NJX 1/MLT TRIGGER POINTS 3/>: CPT | Performed by: NURSE PRACTITIONER

## 2020-01-23 RX ORDER — METHYLPREDNISOLONE ACETATE 40 MG/ML
200 INJECTION, SUSPENSION INTRA-ARTICULAR; INTRALESIONAL; INTRAMUSCULAR; SOFT TISSUE ONCE
Status: COMPLETED | OUTPATIENT
Start: 2020-01-23 | End: 2020-01-23

## 2020-01-23 RX ORDER — BUPIVACAINE HYDROCHLORIDE 7.5 MG/ML
5 INJECTION, SOLUTION EPIDURAL; RETROBULBAR ONCE
Status: COMPLETED | OUTPATIENT
Start: 2020-01-23 | End: 2020-01-23

## 2020-01-23 RX ORDER — ARIPIPRAZOLE 5 MG/1
TABLET ORAL DAILY
COMMUNITY
Start: 2020-01-20 | End: 2020-06-13

## 2020-01-23 RX ADMIN — BUPIVACAINE HYDROCHLORIDE 37.5 MG: 7.5 INJECTION, SOLUTION EPIDURAL; RETROBULBAR at 14:22

## 2020-01-23 RX ADMIN — METHYLPREDNISOLONE ACETATE 200 MG: 40 INJECTION, SUSPENSION INTRA-ARTICULAR; INTRALESIONAL; INTRAMUSCULAR; SOFT TISSUE at 14:28

## 2020-01-23 NOTE — PROGRESS NOTES
Patient with lumbar spine pain and right lower extremity radiculopathy has had trigger points in the past which have helped temporarily she states this last set of muscular injections lasted approximately 8 weeks.  Risks and benefits of the Trigger point injection procedure have been explained to the patient.  Patient has no contraindications such as bleed diathesis, recent acute trauma at the muscle sites, anesthetic allergy or anticoagulation.  Mechanism of trigger point injection has been explained to patient.     Procedure Note:  1.         Patient was positioned comfortably  2.         Before injections are started, 10 Trigger Points sites are identified throughout the bilateral L1, L2 ,  L3, L4,  L5,   paraspinous muscle                                                                                                  4.         Before injection, trigger points sites were palpated for local twitch and referred pain to confirms placement                         5.         Local anesthetic was mixed with Depo-Medrol (5 cc of Marcaine 0.5% mixed with 5 cc of Depo-Medrol at 40 mg/ml - for a total of 10 cc)  6.         30 gauge ½ inch needle was utilized to ensure patient comfort          7.         I started with the most tender spot in above mentioned Trigger Points   1.   Localize most tender spot within taut muscle-fibers  2.   Fix tender spot between fingers (1-2 cm in size)   1.   Prevents from rolling away from needle  2.   Controls subcutaneous bleeding  3.   Before injection, patient was instructed of possible pain on injection  4.   Direct needle at 30 degree angle off skin   8.         Insert needle into skin 1-2 cm from Trigger Point   9.         Advance needle into Trigger Point   10.       Use 1cc anesthetic at each Trigger Points identified in step #2  11.       Redirect needle and reinject                                                                                              1.   Withdraw needle  to subcutaneous tissue  2.   Redirect needle into adjacent tender areas  3.   Repeat until local twitch or tautness resolves  12.   After each of the 10 injections I held direct pressure at injection site for 2 minutes to prevents hematoma formation  13.   The same procedure was repeated for other Tender Points located in the bilateral L1, L2 ,  L3, L4,  L5,   paraspinous muscle.                                                                                      14.   Patient was instructed to gently stretch injected areas to full active range of motion in all directions and was instructed to repeat range of motion three times after injection  15.   Post-Procedure patient was instructed to avoid over-using injected area for 3-4 days   1.   Maintain active range of motion of injected muscle  2.   Patient applies ice to injected areas for a few hours  3.   Anticipate post-injection soreness for 3-4 days  There was no evidence of complications from injection was noted such as local Skin Infection  at injection site or local hematoma at injection site  Patient will have continued trigger point injections through pain management.

## 2020-01-26 ENCOUNTER — APPOINTMENT (OUTPATIENT)
Dept: CT IMAGING | Facility: HOSPITAL | Age: 35
End: 2020-01-26

## 2020-01-26 ENCOUNTER — HOSPITAL ENCOUNTER (EMERGENCY)
Facility: HOSPITAL | Age: 35
Discharge: HOME | End: 2020-01-26
Attending: EMERGENCY MEDICINE | Admitting: EMERGENCY MEDICINE

## 2020-01-26 VITALS
BODY MASS INDEX: 48.82 KG/M2 | WEIGHT: 293 LBS | DIASTOLIC BLOOD PRESSURE: 79 MMHG | SYSTOLIC BLOOD PRESSURE: 150 MMHG | RESPIRATION RATE: 16 BRPM | HEIGHT: 65 IN | TEMPERATURE: 98.3 F | HEART RATE: 75 BPM | OXYGEN SATURATION: 95 %

## 2020-01-26 DIAGNOSIS — N39.0 ACUTE UTI: ICD-10-CM

## 2020-01-26 DIAGNOSIS — R51.9 NONINTRACTABLE HEADACHE, UNSPECIFIED CHRONICITY PATTERN, UNSPECIFIED HEADACHE TYPE: Primary | ICD-10-CM

## 2020-01-26 LAB
ALBUMIN SERPL-MCNC: 4.1 G/DL (ref 3.5–5.2)
ALBUMIN/GLOB SERPL: 1.1 G/DL
ALP SERPL-CCNC: 80 U/L (ref 39–117)
ALT SERPL W P-5'-P-CCNC: 19 U/L (ref 1–33)
ANION GAP SERPL CALCULATED.3IONS-SCNC: 14.5 MMOL/L (ref 5–15)
AST SERPL-CCNC: 20 U/L (ref 1–32)
B-HCG UR QL: NEGATIVE
BACTERIA UR QL AUTO: ABNORMAL /HPF
BASOPHILS # BLD AUTO: 0.04 10*3/MM3 (ref 0–0.2)
BASOPHILS NFR BLD AUTO: 0.4 % (ref 0–1.5)
BILIRUB SERPL-MCNC: 0.4 MG/DL (ref 0.2–1.2)
BILIRUB UR QL STRIP: NEGATIVE
BUN BLD-MCNC: 18 MG/DL (ref 6–20)
BUN/CREAT SERPL: 21.2 (ref 7–25)
CALCIUM SPEC-SCNC: 9.1 MG/DL (ref 8.6–10.5)
CHLORIDE SERPL-SCNC: 98 MMOL/L (ref 98–107)
CLARITY UR: ABNORMAL
CO2 SERPL-SCNC: 23.5 MMOL/L (ref 22–29)
COLOR UR: YELLOW
CREAT BLD-MCNC: 0.85 MG/DL (ref 0.57–1)
DEPRECATED RDW RBC AUTO: 46.5 FL (ref 37–54)
EOSINOPHIL # BLD AUTO: 0.04 10*3/MM3 (ref 0–0.4)
EOSINOPHIL NFR BLD AUTO: 0.4 % (ref 0.3–6.2)
ERYTHROCYTE [DISTWIDTH] IN BLOOD BY AUTOMATED COUNT: 13.8 % (ref 12.3–15.4)
FLUAV AG NPH QL: NEGATIVE
FLUBV AG NPH QL IA: NEGATIVE
GFR SERPL CREATININE-BSD FRML MDRD: 76 ML/MIN/1.73
GLOBULIN UR ELPH-MCNC: 3.6 GM/DL
GLUCOSE BLD-MCNC: 102 MG/DL (ref 65–99)
GLUCOSE UR STRIP-MCNC: NEGATIVE MG/DL
HCT VFR BLD AUTO: 41.8 % (ref 34–46.6)
HGB BLD-MCNC: 14 G/DL (ref 12–15.9)
HGB UR QL STRIP.AUTO: NEGATIVE
HYALINE CASTS UR QL AUTO: ABNORMAL /LPF
IMM GRANULOCYTES # BLD AUTO: 0.05 10*3/MM3 (ref 0–0.05)
IMM GRANULOCYTES NFR BLD AUTO: 0.5 % (ref 0–0.5)
KETONES UR QL STRIP: NEGATIVE
LEUKOCYTE ESTERASE UR QL STRIP.AUTO: ABNORMAL
LYMPHOCYTES # BLD AUTO: 2.46 10*3/MM3 (ref 0.7–3.1)
LYMPHOCYTES NFR BLD AUTO: 24.3 % (ref 19.6–45.3)
MCH RBC QN AUTO: 30.8 PG (ref 26.6–33)
MCHC RBC AUTO-ENTMCNC: 33.5 G/DL (ref 31.5–35.7)
MCV RBC AUTO: 91.9 FL (ref 79–97)
MONOCYTES # BLD AUTO: 1.07 10*3/MM3 (ref 0.1–0.9)
MONOCYTES NFR BLD AUTO: 10.6 % (ref 5–12)
NEUTROPHILS # BLD AUTO: 6.46 10*3/MM3 (ref 1.7–7)
NEUTROPHILS NFR BLD AUTO: 63.8 % (ref 42.7–76)
NITRITE UR QL STRIP: NEGATIVE
NRBC BLD AUTO-RTO: 0 /100 WBC (ref 0–0.2)
PH UR STRIP.AUTO: <=5 [PH] (ref 5–8)
PLATELET # BLD AUTO: 261 10*3/MM3 (ref 140–450)
PMV BLD AUTO: 10.7 FL (ref 6–12)
POTASSIUM BLD-SCNC: 4.4 MMOL/L (ref 3.5–5.2)
PROT SERPL-MCNC: 7.7 G/DL (ref 6–8.5)
PROT UR QL STRIP: NEGATIVE
RBC # BLD AUTO: 4.55 10*6/MM3 (ref 3.77–5.28)
RBC # UR: ABNORMAL /HPF
REF LAB TEST METHOD: ABNORMAL
SODIUM BLD-SCNC: 136 MMOL/L (ref 136–145)
SP GR UR STRIP: >=1.03 (ref 1–1.03)
SQUAMOUS #/AREA URNS HPF: ABNORMAL /HPF
UROBILINOGEN UR QL STRIP: ABNORMAL
WBC NRBC COR # BLD: 10.12 10*3/MM3 (ref 3.4–10.8)
WBC UR QL AUTO: ABNORMAL /HPF

## 2020-01-26 PROCEDURE — 87804 INFLUENZA ASSAY W/OPTIC: CPT | Performed by: NURSE PRACTITIONER

## 2020-01-26 PROCEDURE — 96374 THER/PROPH/DIAG INJ IV PUSH: CPT

## 2020-01-26 PROCEDURE — 99283 EMERGENCY DEPT VISIT LOW MDM: CPT

## 2020-01-26 PROCEDURE — 85025 COMPLETE CBC W/AUTO DIFF WBC: CPT | Performed by: NURSE PRACTITIONER

## 2020-01-26 PROCEDURE — 80053 COMPREHEN METABOLIC PANEL: CPT | Performed by: NURSE PRACTITIONER

## 2020-01-26 PROCEDURE — 25010000002 DIPHENHYDRAMINE PER 50 MG: Performed by: NURSE PRACTITIONER

## 2020-01-26 PROCEDURE — 25010000002 KETOROLAC TROMETHAMINE PER 15 MG: Performed by: NURSE PRACTITIONER

## 2020-01-26 PROCEDURE — 81025 URINE PREGNANCY TEST: CPT | Performed by: NURSE PRACTITIONER

## 2020-01-26 PROCEDURE — 81001 URINALYSIS AUTO W/SCOPE: CPT | Performed by: NURSE PRACTITIONER

## 2020-01-26 PROCEDURE — 96375 TX/PRO/DX INJ NEW DRUG ADDON: CPT

## 2020-01-26 PROCEDURE — 25010000002 ONDANSETRON PER 1 MG: Performed by: NURSE PRACTITIONER

## 2020-01-26 PROCEDURE — 70450 CT HEAD/BRAIN W/O DYE: CPT

## 2020-01-26 RX ORDER — SODIUM CHLORIDE 0.9 % (FLUSH) 0.9 %
10 SYRINGE (ML) INJECTION AS NEEDED
Status: DISCONTINUED | OUTPATIENT
Start: 2020-01-26 | End: 2020-01-27 | Stop reason: HOSPADM

## 2020-01-26 RX ORDER — KETOROLAC TROMETHAMINE 30 MG/ML
30 INJECTION, SOLUTION INTRAMUSCULAR; INTRAVENOUS ONCE
Status: COMPLETED | OUTPATIENT
Start: 2020-01-26 | End: 2020-01-26

## 2020-01-26 RX ORDER — CEFTRIAXONE 1 G/50ML
1 INJECTION, SOLUTION INTRAVENOUS ONCE
Status: DISCONTINUED | OUTPATIENT
Start: 2020-01-26 | End: 2020-01-26

## 2020-01-26 RX ORDER — DIPHENHYDRAMINE HYDROCHLORIDE 50 MG/ML
25 INJECTION INTRAMUSCULAR; INTRAVENOUS ONCE
Status: COMPLETED | OUTPATIENT
Start: 2020-01-26 | End: 2020-01-26

## 2020-01-26 RX ORDER — CEPHALEXIN 250 MG/1
500 CAPSULE ORAL ONCE
Status: COMPLETED | OUTPATIENT
Start: 2020-01-26 | End: 2020-01-26

## 2020-01-26 RX ORDER — ONDANSETRON 2 MG/ML
4 INJECTION INTRAMUSCULAR; INTRAVENOUS ONCE
Status: COMPLETED | OUTPATIENT
Start: 2020-01-26 | End: 2020-01-26

## 2020-01-26 RX ORDER — CEPHALEXIN 500 MG/1
500 CAPSULE ORAL 2 TIMES DAILY
Qty: 14 CAPSULE | Refills: 0 | OUTPATIENT
Start: 2020-01-26 | End: 2020-06-13

## 2020-01-26 RX ADMIN — KETOROLAC TROMETHAMINE 30 MG: 30 INJECTION, SOLUTION INTRAMUSCULAR at 21:39

## 2020-01-26 RX ADMIN — DIPHENHYDRAMINE HYDROCHLORIDE 25 MG: 50 INJECTION INTRAMUSCULAR; INTRAVENOUS at 21:39

## 2020-01-26 RX ADMIN — ONDANSETRON 4 MG: 2 INJECTION INTRAMUSCULAR; INTRAVENOUS at 21:39

## 2020-01-26 RX ADMIN — CEPHALEXIN 500 MG: 250 CAPSULE ORAL at 22:58

## 2020-01-26 RX ADMIN — SODIUM CHLORIDE 1000 ML: 9 INJECTION, SOLUTION INTRAVENOUS at 21:39

## 2020-01-27 NOTE — ED PROVIDER NOTES
Subjective   History of Present Illness  This is a 35 year old female who comes in today complaining of a headache x 4 days. She denies any history of headache in the past. She denies any blurred vision or double vision. She denies any numbness or tingling. She did have some nausea this morning. She reports she took tylenol and ibuprofen without any relief of symptoms.   Review of Systems   Constitutional: Negative.    Eyes: Negative.    Respiratory: Negative.    Cardiovascular: Negative.    Gastrointestinal: Negative.    Endocrine: Negative.    Genitourinary: Negative.    Musculoskeletal: Negative.    Skin: Negative.    Allergic/Immunologic: Negative.    Neurological: Positive for headaches.   Hematological: Negative.    Psychiatric/Behavioral: Negative.        Past Medical History:   Diagnosis Date   • Anxiety    • Anxiety    • Asthma    • Bipolar disorder (CMS/HCC)    • Depression    • Mitral valve prolapse    • Tattoo        No Known Allergies    Past Surgical History:   Procedure Laterality Date   • FIBULA FRACTURE SURGERY  2014, 2015   • MANDIBLE FRACTURE SURGERY  2002   • WISDOM TOOTH EXTRACTION         Family History   Problem Relation Age of Onset   • Breast cancer Mother    • Arthritis Mother    • Diabetes Maternal Grandfather    • Hypertension Father    • Migraines Father    • Arthritis Maternal Grandmother    • Colon cancer Neg Hx        Social History     Socioeconomic History   • Marital status:      Spouse name: Not on file   • Number of children: Not on file   • Years of education: Not on file   • Highest education level: Not on file   Tobacco Use   • Smoking status: Never Smoker   • Smokeless tobacco: Never Used   Substance and Sexual Activity   • Alcohol use: Yes     Alcohol/week: 1.0 standard drinks     Types: 1 Glasses of wine per week     Comment: social   • Drug use: No   • Sexual activity: Yes     Partners: Male     Birth control/protection: None           Objective   Physical Exam    Constitutional: She appears well-developed and well-nourished.   Nursing note and vitals reviewed.  GEN: No acute distress  Head: Normocephalic, atraumatic  Eyes: Pupils equal round reactive to light  ENT: Posterior pharynx normal in appearance, oral mucosa is moist  Chest: Nontender to palpation  Cardiovascular: Regular rate  Lungs: Clear to auscultation bilaterally  Abdomen: Soft, nontender, nondistended, no peritoneal signs  Extremities: No edema, normal appearance  Neuro: GCS 15  Psych: Mood and affect are appropriate      Procedures           ED Course  ED Course as of Jan 26 2302   Sun Jan 26, 2020   2250 Will give her iv rocephin. She does report some dysuria.     [TW]      ED Course User Index  [TW] Shelby Groves APRN                                               MDM  Number of Diagnoses or Management Options     Amount and/or Complexity of Data Reviewed  Clinical lab tests: ordered and reviewed  Tests in the radiology section of CPT®: ordered and reviewed  Review and summarize past medical records: yes  Discuss the patient with other providers: yes    Risk of Complications, Morbidity, and/or Mortality  Presenting problems: moderate  Diagnostic procedures: moderate  Management options: moderate        Final diagnoses:   Nonintractable headache, unspecified chronicity pattern, unspecified headache type   Acute UTI            Shelby Groves APRN  01/26/20 3602

## 2020-01-28 DIAGNOSIS — N97.0 INFERTILITY, ANOVULATION: Primary | ICD-10-CM

## 2020-01-29 LAB — PROGEST SERPL-MCNC: 2.5 NG/ML

## 2020-01-31 ENCOUNTER — OFFICE VISIT (OUTPATIENT)
Dept: OBSTETRICS AND GYNECOLOGY | Facility: CLINIC | Age: 35
End: 2020-01-31

## 2020-01-31 VITALS
SYSTOLIC BLOOD PRESSURE: 124 MMHG | HEIGHT: 65 IN | DIASTOLIC BLOOD PRESSURE: 70 MMHG | WEIGHT: 293 LBS | BODY MASS INDEX: 48.82 KG/M2

## 2020-01-31 DIAGNOSIS — Z31.9 INFERTILITY MANAGEMENT: Primary | ICD-10-CM

## 2020-01-31 PROCEDURE — 99213 OFFICE O/P EST LOW 20 MIN: CPT | Performed by: OBSTETRICS & GYNECOLOGY

## 2020-01-31 NOTE — PROGRESS NOTES
Subjective   Chief Complaint   Patient presents with   • Follow-up     Progesterone Levels /Clomid     Vicky Lowery is a 35 y.o. year old .  Patient's last menstrual period was 2020 (exact date).  She presents to be seen because of  Clomid f/u .  Patient had ovulation x2 with 50 mg and Clomid but this past month did not have ovulation.  Her menses are regular she has been liminal symptoms.  Her partner has not had a semen analysis.    OTHER COMPLAINTS:  Nothing else    The following portions of the patient's history were reviewed and updated as appropriate:  She  has a past medical history of Anxiety, Anxiety, Asthma, Bipolar disorder (CMS/HCC), Depression, Mitral valve prolapse, and Tattoo.  She does not have any pertinent problems on file.  She  has a past surgical history that includes Fibula Fracture Surgery (, ); Mandible fracture surgery (); and [place] tooth extraction.  Her family history includes Arthritis in her maternal grandmother and mother; Breast cancer in her mother; Diabetes in her maternal grandfather; Hypertension in her father; Migraines in her father.  She  reports that she has never smoked. She has never used smokeless tobacco. She reports that she drinks about 1.0 standard drinks of alcohol per week. She reports that she does not use drugs.  Current Outpatient Medications   Medication Sig Dispense Refill   • ARIPiprazole (ABILIFY) 5 MG tablet Daily.     • cephalexin (KEFLEX) 500 MG capsule Take 1 capsule by mouth 2 (Two) Times a Day. 14 capsule 0   • clomiPHENE (CLOMID) 50 MG tablet One po on cycle days 3 through 7 5 tablet 2   • sertraline (ZOLOFT) 50 MG tablet Daily.       No current facility-administered medications for this visit.      Current Outpatient Medications on File Prior to Visit   Medication Sig   • ARIPiprazole (ABILIFY) 5 MG tablet Daily.   • cephalexin (KEFLEX) 500 MG capsule Take 1 capsule by mouth 2 (Two) Times a Day.   • clomiPHENE (CLOMID)  "50 MG tablet One po on cycle days 3 through 7   • sertraline (ZOLOFT) 50 MG tablet Daily.     No current facility-administered medications on file prior to visit.      She has No Known Allergies.    Social History    Tobacco Use      Smoking status: Never Smoker      Smokeless tobacco: Never Used    Review of Systems  Consitutional POS: nothing reported    NEG: anorexia or night sweats   Gastointestinal POS: nothing reported    NEG: bloating, change in bowel habits, melena or reflux symptoms   Genitourinary POS: nothing reported    NEG: dysuria or hematuria   Integument POS: nothing reported    NEG: moles that are changing in size, shape, color or rashes   Breast POS: nothing reported    NEG: persistent breast lump, skin dimpling or nipple discharge         Respiratory: negative  Cardiovascular: negative          Objective   /70   Ht 165.1 cm (65\")   Wt (!) 182 kg (401 lb)   LMP 01/08/2020 (Exact Date)   BMI 66.73 kg/m²     General:  well developed; well nourished  no acute distress  obese - Body mass index is 66.73 kg/m².   Skin:  No suspicious lesions seen   Thyroid: normal to inspection and palpation   Lungs:  breathing is unlabored   Heart:  Not performed.   Breasts:  Not performed.   Abdomen: soft, non-tender; no masses  no umbilical or inguinal hernias are present  no hepato-splenomegaly   Pelvis: Not performed.     Psychiatric: Alert and oriented ×3, mood and affect appropriate  HEENT: Atraumatic, normocephalic, normal scleral icterus  Extremities: 2+ pulses bilaterally, no edema      Lab Review   CBC, CMP, PRL and TSH    Imaging   Pelvic ultrasound report        Assessment   1. Infertility management: Patient's chart was reviewed.  Images of her ultrasound from September were reviewed her ovaries do not really demonstrate a PCO appearance.  But we will go ahead and check a DHEAS, total testosterone, 17 hydroxyprogesterone today.  Discontinue the Clomid for 3 months.  After which we will go 100 mg.  " However patient has to have a semen analysis before we will start this and I explained this to her explicitly.  If her labs done today are indicative of PCO I did discuss starting her on metformin 750 mg extended release p.o. every morning.     Plan   1. As above  2.     No orders of the defined types were placed in this encounter.         This note was electronically signed.      January 31, 2020

## 2020-02-04 LAB
17OHP SERPL-MCNC: 90 NG/DL
DHEA-S SERPL-MCNC: 41.8 UG/DL (ref 57.3–279.2)
TESTOST SERPL-MCNC: 10 NG/DL (ref 8–48)

## 2020-06-02 ENCOUNTER — TRANSCRIBE ORDERS (OUTPATIENT)
Dept: LAB | Facility: HOSPITAL | Age: 35
End: 2020-06-02

## 2020-06-02 ENCOUNTER — LAB (OUTPATIENT)
Dept: LAB | Facility: HOSPITAL | Age: 35
End: 2020-06-02

## 2020-06-02 DIAGNOSIS — D89.89 RADIATION CHIMERA (HCC): ICD-10-CM

## 2020-06-02 DIAGNOSIS — R53.82 CHRONIC FATIGUE, UNSPECIFIED: ICD-10-CM

## 2020-06-02 DIAGNOSIS — N18.2 CHRONIC KIDNEY DISEASE, STAGE II (MILD): ICD-10-CM

## 2020-06-02 DIAGNOSIS — M13.0 POLYARTHRITIS: ICD-10-CM

## 2020-06-02 DIAGNOSIS — M13.0 POLYARTHRITIS: Primary | ICD-10-CM

## 2020-06-02 DIAGNOSIS — M79.10 MYALGIA: ICD-10-CM

## 2020-06-15 ENCOUNTER — LAB REQUISITION (OUTPATIENT)
Dept: LAB | Facility: HOSPITAL | Age: 35
End: 2020-06-15

## 2020-06-15 DIAGNOSIS — R42 DIZZINESS AND GIDDINESS: ICD-10-CM

## 2020-06-15 PROCEDURE — U0002 COVID-19 LAB TEST NON-CDC: HCPCS | Performed by: INTERNAL MEDICINE

## 2020-06-16 LAB
REF LAB TEST METHOD: NORMAL
SARS-COV-2 RNA RESP QL NAA+PROBE: NOT DETECTED

## 2020-07-30 ENCOUNTER — OFFICE VISIT (OUTPATIENT)
Dept: NEUROLOGY | Facility: CLINIC | Age: 35
End: 2020-07-30

## 2020-07-30 VITALS
TEMPERATURE: 98.6 F | HEART RATE: 112 BPM | BODY MASS INDEX: 48.82 KG/M2 | OXYGEN SATURATION: 98 % | WEIGHT: 293 LBS | DIASTOLIC BLOOD PRESSURE: 76 MMHG | SYSTOLIC BLOOD PRESSURE: 128 MMHG | HEIGHT: 65 IN

## 2020-07-30 DIAGNOSIS — G43.719 INTRACTABLE CHRONIC MIGRAINE WITHOUT AURA AND WITHOUT STATUS MIGRAINOSUS: Primary | ICD-10-CM

## 2020-07-30 PROCEDURE — 99214 OFFICE O/P EST MOD 30 MIN: CPT | Performed by: NURSE PRACTITIONER

## 2020-07-30 RX ORDER — LAMOTRIGINE 100 MG/1
TABLET ORAL
Qty: 21 TABLET | Refills: 0 | Status: SHIPPED | OUTPATIENT
Start: 2020-08-28 | End: 2020-09-10

## 2020-07-30 RX ORDER — MAGNESIUM OXIDE 400 MG/1
400 TABLET ORAL EVERY EVENING
Qty: 30 TABLET | Refills: 3 | OUTPATIENT
Start: 2020-07-30 | End: 2020-08-28

## 2020-07-30 RX ORDER — LAMOTRIGINE 25 MG/1
TABLET ORAL
Qty: 84 TABLET | Refills: 0 | Status: SHIPPED | OUTPATIENT
Start: 2020-07-30 | End: 2020-08-27

## 2020-07-30 RX ORDER — VITAMIN B COMPLEX
1 CAPSULE ORAL DAILY
Qty: 30 CAPSULE | Refills: 11 | Status: SHIPPED | OUTPATIENT
Start: 2020-07-30 | End: 2021-07-30

## 2020-07-30 RX ORDER — ERGOCALCIFEROL 1.25 MG/1
50000 CAPSULE ORAL WEEKLY
COMMUNITY
Start: 2020-05-18

## 2020-07-30 RX ORDER — HYDROCHLOROTHIAZIDE 25 MG/1
25 TABLET ORAL DAILY
COMMUNITY
End: 2022-11-22

## 2020-08-06 ENCOUNTER — TELEPHONE (OUTPATIENT)
Dept: NEUROLOGY | Facility: CLINIC | Age: 35
End: 2020-08-06

## 2020-08-06 NOTE — TELEPHONE ENCOUNTER
THE PT  CALLED IN STATING THAT THE PHARMACY [facility] STATED SHE NEEDS A PRIOR AUTH FOR HER RIMEGEPANT 75 MG . SHE DIDN'T KNOW IF SHE SHOULD SWITCH MEDICATIONS OR GET A PRIOR AUTH DONE HER BEST CALL BACK NUMBER [phone]

## 2020-08-12 NOTE — TELEPHONE ENCOUNTER
SUBMITTED AUTH OVER PHONE.  [employer] WILL SEND NOTICE OF APPROVAL OR DENIAL IN 24 HOURS.  PT HAS BEEN NOTIFIED.

## 2020-08-14 ENCOUNTER — TRANSCRIBE ORDERS (OUTPATIENT)
Dept: GENERAL RADIOLOGY | Facility: HOSPITAL | Age: 35
End: 2020-08-14

## 2020-08-14 ENCOUNTER — HOSPITAL ENCOUNTER (OUTPATIENT)
Dept: MRI IMAGING | Facility: HOSPITAL | Age: 35
Discharge: HOME | End: 2020-08-14
Admitting: NURSE PRACTITIONER

## 2020-08-14 ENCOUNTER — HOSPITAL ENCOUNTER (OUTPATIENT)
Dept: GENERAL RADIOLOGY | Facility: HOSPITAL | Age: 35
Discharge: HOME | End: 2020-08-14

## 2020-08-14 DIAGNOSIS — M54.50 LOW BACK PAIN, UNSPECIFIED BACK PAIN LATERALITY, UNSPECIFIED CHRONICITY, UNSPECIFIED WHETHER SCIATICA PRESENT: Primary | ICD-10-CM

## 2020-08-14 DIAGNOSIS — M54.50 LOW BACK PAIN, UNSPECIFIED BACK PAIN LATERALITY, UNSPECIFIED CHRONICITY, UNSPECIFIED WHETHER SCIATICA PRESENT: ICD-10-CM

## 2020-08-14 DIAGNOSIS — G43.719 INTRACTABLE CHRONIC MIGRAINE WITHOUT AURA AND WITHOUT STATUS MIGRAINOSUS: ICD-10-CM

## 2020-08-14 PROCEDURE — 72100 X-RAY EXAM L-S SPINE 2/3 VWS: CPT

## 2020-08-14 NOTE — TELEPHONE ENCOUNTER
PT CALLING AGAIN ABOUT THE PA FOR THE Rimegepant Sulfate (rimegepant) 75 MG . PLEASE CALL HER AND LET HER KNOW IF [employer] HAS SENT THE APPROVAL OR DENIAL, CALL HER [phone]

## 2020-08-17 ENCOUNTER — TELEPHONE (OUTPATIENT)
Dept: NEUROLOGY | Facility: CLINIC | Age: 35
End: 2020-08-17

## 2020-08-17 ENCOUNTER — HOSPITAL ENCOUNTER (OUTPATIENT)
Dept: MRI IMAGING | Facility: HOSPITAL | Age: 35
Discharge: HOME | End: 2020-08-17
Admitting: NURSE PRACTITIONER

## 2020-08-17 PROCEDURE — 70551 MRI BRAIN STEM W/O DYE: CPT

## 2020-08-17 NOTE — TELEPHONE ENCOUNTER
SPOKE WITH PT, SHE STATES SHE WOULD LIKE TO HAVE MRI SCHEDULED AT [facility].  REFERRAL HAS BEEN SENT.

## 2020-08-17 NOTE — TELEPHONE ENCOUNTER
STEPHAN FROM  CALLED STATING PT WAS SUPPOSED TO HAVE AN MRI DONE ON 8/14/20 BUT STATES SHE WAS TOO LARGE TO FIT AND WAS UNABLE TO HAVE IT DONE. PLEASE BE ADVISED      CALL BACK- [phone]

## 2020-08-18 ENCOUNTER — APPOINTMENT (OUTPATIENT)
Dept: MRI IMAGING | Facility: HOSPITAL | Age: 35
End: 2020-08-18

## 2020-08-20 ENCOUNTER — OFFICE VISIT (OUTPATIENT)
Dept: BEHAVIORAL HEALTH | Facility: CLINIC | Age: 35
End: 2020-08-20

## 2020-08-20 VITALS
BODY MASS INDEX: 48.82 KG/M2 | OXYGEN SATURATION: 91 % | WEIGHT: 293 LBS | HEART RATE: 144 BPM | HEIGHT: 65 IN | DIASTOLIC BLOOD PRESSURE: 86 MMHG | SYSTOLIC BLOOD PRESSURE: 138 MMHG | TEMPERATURE: 98 F

## 2020-08-20 DIAGNOSIS — F43.10 POST TRAUMATIC STRESS DISORDER (PTSD): Primary | ICD-10-CM

## 2020-08-20 PROCEDURE — 90792 PSYCH DIAG EVAL W/MED SRVCS: CPT | Performed by: NURSE PRACTITIONER

## 2020-08-20 RX ORDER — HYDROXYCHLOROQUINE SULFATE 200 MG/1
TABLET, FILM COATED ORAL DAILY
COMMUNITY

## 2020-08-20 NOTE — PROGRESS NOTES
Patient Name: Vicky Lowery  MRN: [id number]   :  1985     Referring Physician: Aria Moraes APRN    Chief Complaint:     ICD-10-CM ICD-9-CM   1. Post traumatic stress disorder (PTSD) F43.10 309.81       HPI:   Vicky Lowery is a 35 y.o. female who is here today for initial evaluation of PTSD.  Patient has soon many different providers through the years.  Has been diagnosed with different things from the different providers.  Patient states 1 provider diagnosed PTSD.  Another provider diagnosed bipolar disorder.  Another provider diagnosed schizoaffective disorder.  Patient is not sure what specifically her diagnosis is.  She has a lot of depression and anxiety.  Symptoms started in .  One provider told patient the only other thing they thought would be effective his ECT treatments.  Patient has cut in the past not to attempt suicide patient states she does hear her name being called or knocking on the door when nobody is there.  Most recently the patient was on Lamictal 600 mg, Wellbutrin for 150 mg, Abilify 30 mg, and Klonopin 1 mg 3 times a day as needed.  States she is taken Zoloft and Prozac in the past with no effect.  Is currently on disability now.  Patient states she is considering getting pregnant.  Has stage II chronic kidney disease.    Past Medical History:   Past Medical History:   Diagnosis Date   • Anxiety    • Anxiety    • Asthma    • Back pain    • Bipolar disorder (CMS/[facility])    • Depression    • Kidney disease, chronic, stage II (mild, EGFR 60+ ml/min)    • Migraine    • Mitral valve prolapse    • Tattoo        Past Surgical History:   Past Surgical History:   Procedure Laterality Date   • FIBULA FRACTURE SURGERY  ,    • MANDIBLE FRACTURE SURGERY     • WISDOM TOOTH EXTRACTION         Social History:   Social History     Socioeconomic History   • Marital status:      Spouse name: Not on file   • Number of children: Not on file   • Years of  education: Not on file   • Highest education level: Not on file   Tobacco Use   • Smoking status: Never Smoker   • Smokeless tobacco: Never Used   Substance and Sexual Activity   • Alcohol use: Yes     Alcohol/week: 1.0 standard drinks     Types: 1 Glasses of wine per week     Comment: social   • Drug use: No   • Sexual activity: Yes     Partners: Male     Birth control/protection: None       Family History:  Family History   Problem Relation Age of Onset   • Breast cancer Mother    • Arthritis Mother    • Diabetes Maternal Grandfather    • Hypertension Father    • Migraines Father    • Arthritis Maternal Grandmother    • Colon cancer Neg Hx        Allergy:  Allergies   Allergen Reactions   • Triptans Swelling     Felt like throat was closing up   • Nsaids Other (See Comments)     Developed renal insufficiency after long term use       Current Medications:   Current Outpatient Medications   Medication Sig Dispense Refill   • b complex vitamins capsule Take 1 capsule by mouth Daily. 30 capsule 11   • hydroCHLOROthiazide (HYDRODIURIL) 25 MG tablet Take 25 mg by mouth Daily.     • hydroxychloroquine (PLAQUENIL) 200 MG tablet Take  by mouth Daily.     • [START ON 8/28/2020] lamoTRIgine (LaMICtal) 100 MG tablet Take 0.5 tablets by mouth Every Morning AND 1 tablet Every Night. Do all this for 14 days. 21 tablet 0   • lamoTRIgine (LaMICtal) 25 MG tablet Take 1 tablet by mouth 2 (Two) Times a Day for 14 days, THEN 2 tablets 2 (Two) Times a Day for 14 days. 84 tablet 0   • magnesium oxide (MAG-OX) 400 MG tablet Take 1 tablet by mouth Every Evening. 30 tablet 3   • Rimegepant Sulfate (rimegepant) 75 MG tablet dispersible Take 75 mg by mouth 1 (One) Time As Needed (migraine) for up to 1 dose. 10 tablet 2   • vitamin D (ERGOCALCIFEROL) 1.25 MG (79291 UT) capsule capsule Take 50,000 Units by mouth 1 (One) Time Per Week.       No current facility-administered medications for this visit.        Lab Results:   Lab Requisition on  06/15/2020   Component Date Value Ref Range Status   • Reference Lab Report 06/15/2020 See Scanned Report   Final   • COVID19 06/15/2020 Not Detected  Not Detected - Ref. Range Final       Review of Symptoms:   Review of Systems   Constitutional: Negative for activity change, appetite change, fatigue, unexpected weight gain and unexpected weight loss.   Respiratory: Negative for shortness of breath and wheezing.    Gastrointestinal: Negative for constipation, diarrhea, nausea and vomiting.   Musculoskeletal: Negative for gait problem.   Skin: Negative for dry skin and rash.   Neurological: Negative for dizziness, speech difficulty, weakness, light-headedness, headache, memory problem and confusion.   Psychiatric/Behavioral: Positive for dysphoric mood, hallucinations, sleep disturbance, depressed mood and stress. Negative for agitation, behavioral problems, decreased concentration, self-injury, suicidal ideas and negative for hyperactivity. The patient is nervous/anxious.        Physical Exam:   Physical Exam   Constitutional: She is oriented to person, place, and time. She appears well-developed and well-nourished. She is cooperative. No distress.   HENT:   Head: Normocephalic and atraumatic.   Eyes: Conjunctivae are normal.   Neck: Normal range of motion and full passive range of motion without pain.   Cardiovascular: Normal rate.   Pulmonary/Chest: Effort normal. No respiratory distress.   Musculoskeletal: Normal range of motion.   Neurological: She is alert and oriented to person, place, and time.   Skin: Skin is warm, dry and intact. She is not diaphoretic.   Psychiatric: Her behavior is normal. Judgment and thought content normal. Her mood appears anxious. Her affect is not angry, not blunt, not labile and not inappropriate. Her speech is not rapid and/or pressured and not tangential. She is not agitated, not aggressive, not hyperactive, not slowed, not withdrawn and not combative. Thought content is not  "paranoid and not delusional. Cognition and memory are normal. She exhibits a depressed mood. She expresses no homicidal and no suicidal ideation. She expresses no suicidal plans and no homicidal plans.   Nursing note and vitals reviewed.    Blood pressure 138/86, pulse (!) 144, temperature 98 °F (36.7 °C), height 165.1 cm (65\"), weight (!) 205 kg (451 lb), SpO2 91 %.  Body mass index is 75.05 kg/m².     Mental Status Exam:   Appearance: disheveled  Hygiene:   fair  Cooperation:  Cooperative  Eye Contact:  Good  Psychomotor Behavior:  Appropriate  Mood:anxious, depressed, dysthymic and sad  Affect:  Appropriate  Hopelessness: 5  Speech:  Normal  Thought Process:  Goal directed  Thought Content:  Normal  Suicidal:  None  Homicidal:  None  Hallucinations:  Auditory  Delusion:  None  Memory:  Intact  Orientation:  Person, Place, Time and Situation  Reliability:  good  Insight:  Good  Judgement:  Good  Impulse Control:  Good  Physical/Medical Issues:  No     PHQ-9 Depression Screening  Little interest or pleasure in doing things? 1   Feeling down, depressed, or hopeless? 2   Trouble falling or staying asleep, or sleeping too much? 3   Feeling tired or having little energy? 3   Poor appetite or overeating? 1   Feeling bad about yourself - or that you are a failure or have let yourself or your family down? 1   Trouble concentrating on things, such as reading the newspaper or watching television? 1   Moving or speaking so slowly that other people could have noticed? Or the opposite - being so fidgety or restless that you have been moving around a lot more than usual? 1   Thoughts that you would be better off dead, or of hurting yourself in some way? 0   PHQ-9 Total Score 13   If you checked off any problems, how difficult have these problems made it for you to do your work, take care of things at home, or get along with other people?        Assessment/Plan:   Vicky was seen today for [facility] care.    Diagnoses and all " orders for this visit:    Post traumatic stress disorder (PTSD)    Patient was prescribed Lamictal 100 mg by her neurologist to help with migraines.  Patient is agreeable not to start any new medication until we do Gene[employer]ight testing and follow-up with those results.    A psychological evaluation was conducted in order to assess past and current level of functioning. Areas assessed included, but were not limited to: perception of social support, perception of ability to face and deal with challenges in life (positive functioning), anxiety symptoms, depressive symptoms, perspective on beliefs/belief system, coping skills for stress, intelligence level,  Therapeutic rapport was established. Interventions conducted today were geared towards incorporating medication management along with support for continued therapy. Education was also provided as to the med management with this provider and what to expect in subsequent sessions.    We discussed risks, benefits,goals and side effects of the above medication and the patient was agreeable with the plan.Patient was educated on the importance of compliance with treatment and follow-up appointments. Patient is aware to contact the [facility] with any worsening of symptoms. To call for questions or concerns and return early if necessary. Patent is agreeable to go to the Emergency Department or call 911 should they begin SI/HI.     Treatment Plan:   Discussed risks, benefits, and alternatives of medication. Encouraged healthy habits (eating, exercise and sleep). Call if any questions or problems arise. Medication reconciled. Controlled substance monitoring report reviewed. Provided psychoeducation.. Discussed coping strategies and current stressors. Set appropriate boundaries and limits for patient's well-being. Use distraction techniques to improve symptoms. Access support networks.      Return in about 3 weeks (around 9/10/2020) for Follow Up genetic testing review 30  minutes.    Minoo Harrington, APRN

## 2020-08-27 ENCOUNTER — OFFICE VISIT (OUTPATIENT)
Dept: OBSTETRICS AND GYNECOLOGY | Facility: CLINIC | Age: 35
End: 2020-08-27

## 2020-08-27 VITALS
HEIGHT: 65 IN | SYSTOLIC BLOOD PRESSURE: 142 MMHG | DIASTOLIC BLOOD PRESSURE: 80 MMHG | BODY MASS INDEX: 48.82 KG/M2 | WEIGHT: 293 LBS

## 2020-08-27 DIAGNOSIS — N93.9 ABNORMAL UTERINE BLEEDING (AUB): Primary | ICD-10-CM

## 2020-08-27 PROCEDURE — 99213 OFFICE O/P EST LOW 20 MIN: CPT | Performed by: OBSTETRICS & GYNECOLOGY

## 2020-08-27 NOTE — PROGRESS NOTES
Subjective   Chief Complaint   Patient presents with   • Spotting during Ovulation     Vicky Lowery is a 35 y.o. year old .  Patient's last menstrual period was 2020 (approximate).  She presents to be seen because of  Spotting during ovulation this past month (2 weeks ago). Rust colored, quite a bit, resolved by the am. .   Menses are ~ q 29 days, last about 3-4 days    OTHER COMPLAINTS:  Nothing else    The following portions of the patient's history were reviewed and updated as appropriate:  She  has a past medical history of Anxiety, Anxiety, Asthma, Back pain, Bipolar disorder (CMS/HCC), Depression, Kidney disease, chronic, stage II (mild, EGFR 60+ ml/min), Migraine, Mitral valve prolapse, and Tattoo.  She does not have any pertinent problems on file.  She  has a past surgical history that includes Fibula Fracture Surgery (, ); Mandible fracture surgery (); and [place] tooth extraction.  Her family history includes Arthritis in her maternal grandmother and mother; Breast cancer in her mother; Diabetes in her maternal grandfather; Hypertension in her father; Migraines in her father.  She  reports that she has never smoked. She has never used smokeless tobacco. She reports that she drinks about 1.0 standard drinks of alcohol per week. She reports that she does not use drugs.  Current Outpatient Medications   Medication Sig Dispense Refill   • b complex vitamins capsule Take 1 capsule by mouth Daily. 30 capsule 11   • hydroCHLOROthiazide (HYDRODIURIL) 25 MG tablet Take 25 mg by mouth Daily.     • hydroxychloroquine (PLAQUENIL) 200 MG tablet Take  by mouth Daily.     • [START ON 2020] lamoTRIgine (LaMICtal) 100 MG tablet Take 0.5 tablets by mouth Every Morning AND 1 tablet Every Night. Do all this for 14 days. 21 tablet 0   • lamoTRIgine (LaMICtal) 25 MG tablet Take 1 tablet by mouth 2 (Two) Times a Day for 14 days, THEN 2 tablets 2 (Two) Times a Day for 14 days. 84 tablet 0    • magnesium oxide (MAG-OX) 400 MG tablet Take 1 tablet by mouth Every Evening. 30 tablet 3   • Rimegepant Sulfate (rimegepant) 75 MG tablet dispersible Take 75 mg by mouth 1 (One) Time As Needed (migraine) for up to 1 dose. 10 tablet 2   • vitamin D (ERGOCALCIFEROL) 1.25 MG (31233 UT) capsule capsule Take 50,000 Units by mouth 1 (One) Time Per Week.       No current facility-administered medications for this visit.      Current Outpatient Medications on File Prior to Visit   Medication Sig   • b complex vitamins capsule Take 1 capsule by mouth Daily.   • hydroCHLOROthiazide (HYDRODIURIL) 25 MG tablet Take 25 mg by mouth Daily.   • hydroxychloroquine (PLAQUENIL) 200 MG tablet Take  by mouth Daily.   • [START ON 8/28/2020] lamoTRIgine (LaMICtal) 100 MG tablet Take 0.5 tablets by mouth Every Morning AND 1 tablet Every Night. Do all this for 14 days.   • lamoTRIgine (LaMICtal) 25 MG tablet Take 1 tablet by mouth 2 (Two) Times a Day for 14 days, THEN 2 tablets 2 (Two) Times a Day for 14 days.   • magnesium oxide (MAG-OX) 400 MG tablet Take 1 tablet by mouth Every Evening.   • Rimegepant Sulfate (rimegepant) 75 MG tablet dispersible Take 75 mg by mouth 1 (One) Time As Needed (migraine) for up to 1 dose.   • vitamin D (ERGOCALCIFEROL) 1.25 MG (33112 UT) capsule capsule Take 50,000 Units by mouth 1 (One) Time Per Week.     No current facility-administered medications on file prior to visit.      She is allergic to triptans and nsaids.    Social History    Tobacco Use      Smoking status: Never Smoker      Smokeless tobacco: Never Used    Review of Systems  Consitutional POS: nothing reported    NEG: anorexia or night sweats   Gastointestinal POS: nothing reported    NEG: bloating, change in bowel habits, melena or reflux symptoms   Genitourinary POS: nothing reported    NEG: dysuria or hematuria   Integument POS: nothing reported    NEG: moles that are changing in size, shape, color or rashes   Breast POS: nothing  "reported    NEG: persistent breast lump, skin dimpling or nipple discharge         Respiratory: negative  Cardiovascular: negative          Objective   /80   Ht 165.1 cm (65\")   Wt (!) 202 kg (445 lb)   LMP 07/30/2020 (Approximate)   BMI 74.05 kg/m²     General:  well developed; well nourished  no acute distress  obese - Body mass index is 74.05 kg/m².   Skin:  No suspicious lesions seen   Thyroid: normal to inspection and palpation   Lungs:  breathing is unlabored   Heart:  Not performed.   Breasts:  Not performed.   Abdomen: soft, non-tender; no masses  no umbilical or inguinal hernias are present  no hepato-splenomegaly   Pelvis: Not performed.     Psychiatric: Alert and oriented ×3, mood and affect appropriate  HEENT: Atraumatic, normocephalic, normal scleral icterus  Extremities: 2+ pulses bilaterally, no edema      Lab Review   No data reviewed    Imaging   No data reviewed        Assessment   1. Reassurance given     Plan   1. Clomid 50mg po CD 3 through 7  2. Day 21 progesterone  3. If AUb recurs rTC for TVS    No orders of the defined types were placed in this encounter.         This note was electronically signed.      August 27, 2020      "

## 2020-08-28 DIAGNOSIS — F43.10 POST TRAUMATIC STRESS DISORDER (PTSD): ICD-10-CM

## 2020-08-29 ENCOUNTER — APPOINTMENT (OUTPATIENT)
Dept: GENERAL RADIOLOGY | Facility: HOSPITAL | Age: 35
End: 2020-08-29

## 2020-08-29 ENCOUNTER — HOSPITAL ENCOUNTER (EMERGENCY)
Facility: HOSPITAL | Age: 35
Discharge: HOME | End: 2020-08-29
Attending: EMERGENCY MEDICINE | Admitting: EMERGENCY MEDICINE

## 2020-08-29 ENCOUNTER — APPOINTMENT (OUTPATIENT)
Dept: ULTRASOUND IMAGING | Facility: HOSPITAL | Age: 35
End: 2020-08-29

## 2020-08-29 VITALS
OXYGEN SATURATION: 95 % | HEIGHT: 65 IN | DIASTOLIC BLOOD PRESSURE: 96 MMHG | WEIGHT: 293 LBS | RESPIRATION RATE: 16 BRPM | SYSTOLIC BLOOD PRESSURE: 156 MMHG | BODY MASS INDEX: 48.82 KG/M2 | TEMPERATURE: 98.8 F | HEART RATE: 110 BPM

## 2020-08-29 DIAGNOSIS — M79.601 RIGHT ARM PAIN: Primary | ICD-10-CM

## 2020-08-29 LAB — B-HCG UR QL: NEGATIVE

## 2020-08-29 PROCEDURE — 81025 URINE PREGNANCY TEST: CPT | Performed by: NURSE PRACTITIONER

## 2020-08-29 PROCEDURE — 73080 X-RAY EXAM OF ELBOW: CPT

## 2020-08-29 PROCEDURE — 99283 EMERGENCY DEPT VISIT LOW MDM: CPT

## 2020-08-29 PROCEDURE — 93971 EXTREMITY STUDY: CPT

## 2020-09-04 ENCOUNTER — TELEPHONE (OUTPATIENT)
Dept: NEUROLOGY | Facility: CLINIC | Age: 35
End: 2020-09-04

## 2020-09-04 NOTE — TELEPHONE ENCOUNTER
PT IS CALLING BACK WANTING TO SEE IF DOMINIQUE WILL RECOMMEND SOMETHING DUE TO SHE IS NAUSEATED AND VOMITING AND PT STILL HAS A HEADACHE, PLEASE CALL HER BACK [phone] OR  [phone]

## 2020-09-04 NOTE — TELEPHONE ENCOUNTER
RELAYED MESSAGE TO PT PER DIANE GONZALEZ, SHE STATED UNDERSTANDING BUT SHE DID STATE SHE TOOK ONE AT THE ER ON 8-29-20 AND IT WAS NEGATIVE. SHE STATES SHE IS CURRENTLY ON HER MENSTRUAL     PLEASE ADVISE ABOUT BEING NAUSEOUS      Vicky Lowery (Self) [phone] (H)

## 2020-09-04 NOTE — TELEPHONE ENCOUNTER
She has been actively planning pregnancy, I would like her to take a pregnancy test to rule this out. She can certainly take Tylenol for residual headache. If she is not pregnant, we can do a phone call to discuss options.

## 2020-09-08 NOTE — TELEPHONE ENCOUNTER
SHE IS ACTIVE WITH MYCHART, OFFER THE VIDEO VISIT FIRST.  IF SHE DOES NOT WANT TO HAVE VIDEO VISIT SCHEDULE A TELE VISIT.

## 2020-09-08 NOTE — TELEPHONE ENCOUNTER
IF SHE CALLS BACK OR WHEN I TRY LATER ON TODAY DO I NEED TO CHANGE HER APPOINTMENT ON 9- TO A TELEHEALTH/VIDEO VISIT?

## 2020-09-10 ENCOUNTER — OFFICE VISIT (OUTPATIENT)
Dept: NEUROLOGY | Facility: CLINIC | Age: 35
End: 2020-09-10

## 2020-09-10 VITALS
OXYGEN SATURATION: 96 % | HEART RATE: 104 BPM | SYSTOLIC BLOOD PRESSURE: 108 MMHG | DIASTOLIC BLOOD PRESSURE: 64 MMHG | HEIGHT: 65 IN | TEMPERATURE: 98.4 F | BODY MASS INDEX: 48.82 KG/M2 | WEIGHT: 293 LBS

## 2020-09-10 DIAGNOSIS — G43.719 INTRACTABLE CHRONIC MIGRAINE WITHOUT AURA AND WITHOUT STATUS MIGRAINOSUS: ICD-10-CM

## 2020-09-10 PROCEDURE — 99213 OFFICE O/P EST LOW 20 MIN: CPT | Performed by: NURSE PRACTITIONER

## 2020-09-10 RX ORDER — LAMOTRIGINE 100 MG/1
100 TABLET ORAL 2 TIMES DAILY
Qty: 30 TABLET | Refills: 2 | Status: SHIPPED | OUTPATIENT
Start: 2020-09-10 | End: 2020-09-15

## 2020-09-10 RX ORDER — PROPRANOLOL HYDROCHLORIDE 40 MG/1
40 TABLET ORAL 3 TIMES DAILY
Qty: 60 TABLET | Refills: 2 | Status: SHIPPED | OUTPATIENT
Start: 2020-09-10 | End: 2020-10-22 | Stop reason: SDUPTHER

## 2020-09-10 NOTE — PROGRESS NOTES
"     Follow Up Neurology Office Visit      Patient Name: Vicky Lowery    Referring Physician: No ref. provider found    Chief Complaint:    Chief Complaint   Patient presents with   • Migraine     Patient is here for migraines and to discuss mediction options.       History of Present Illness: Vicky Lowery is a 35 y.o. female who is here to follow up with Neurology for headaches.  One recent intractable migraine, experienced nausea with Nurtec but is uncertain if this was due to migraine or medication side effect.  She reports that she typically has a least 2 \"normal\" headaches per week, and 2 migraine episodes.  She has been taking Lamictal without adverse side effects.  She denies rash or changes in mood or behavior.  She is still desiring pregnancy, and has been prescribed Clomid per recent GYN note.    The following portions of the patient's history were reviewed and updated as appropriate: allergies, current medications, past family history, past medical history, past social history, past surgical history and problem list.    Subjective     Review of Systems:   Review of Systems   Constitutional: Negative for activity change and fatigue.   HENT: Negative for drooling and voice change.    Eyes: Negative for blurred vision, double vision, photophobia and visual disturbance.   Respiratory: Negative for shortness of breath.    Cardiovascular: Negative for chest pain and palpitations.   Gastrointestinal: Negative for nausea and vomiting.   Genitourinary: Negative for urinary incontinence.   Musculoskeletal: Negative for arthralgias, back pain, gait problem, myalgias and neck pain.   Allergic/Immunologic: Negative for immunocompromised state.   Neurological: Positive for headache. Negative for dizziness, tremors, seizures, syncope, facial asymmetry, speech difficulty, weakness, light-headedness, numbness, memory problem and confusion.   Hematological: Does not bruise/bleed easily. " "  Psychiatric/Behavioral: Negative for decreased concentration, dysphoric mood, hallucinations, sleep disturbance, depressed mood and stress. The patient is not nervous/anxious.        Medications:     Current Outpatient Medications:   •  b complex vitamins capsule, Take 1 capsule by mouth Daily., Disp: 30 capsule, Rfl: 11  •  clomiPHENE (CLOMID) 50 MG tablet, Take 1 tablet by mouth Daily. Cycle days 3 through 7, Disp: 5 tablet, Rfl: 2  •  hydroCHLOROthiazide (HYDRODIURIL) 25 MG tablet, Take 25 mg by mouth Daily., Disp: , Rfl:   •  hydroxychloroquine (PLAQUENIL) 200 MG tablet, Take  by mouth Daily., Disp: , Rfl:   •  lamoTRIgine (LaMICtal) 100 MG tablet, Take 1 tablet by mouth 2 (Two) Times a Day for 14 days., Disp: 30 tablet, Rfl: 2  •  vitamin D (ERGOCALCIFEROL) 1.25 MG (44256 UT) capsule capsule, Take 50,000 Units by mouth 1 (One) Time Per Week., Disp: , Rfl:   •  propranolol (INDERAL) 40 MG tablet, Take 1 tablet by mouth 3 (Three) Times a Day., Disp: 60 tablet, Rfl: 2  •  ubrogepant (ubrogepant) 50 MG tablet, Take 1 tablet by mouth As Needed (1 AT ONSET OF MIGRAINE, MAY REPEAT 2 HOURS AFTER IF MIGRAINE HAS SUBSIDED.) for up to 2 doses., Disp: 2 tablet, Rfl: 0  •  ziprasidone (GEODON) 40 MG capsule, Take 1 capsule by mouth Every Night., Disp: 30 capsule, Rfl: 2    Allergies:   Allergies   Allergen Reactions   • Triptans Swelling     Felt like throat was closing up   • Nsaids Other (See Comments)     Developed renal insufficiency after long term use       Objective     Physical Exam:  Vital Signs:   Vitals:    09/10/20 1424   BP: 108/64   BP Location: Right arm   Patient Position: Sitting   Cuff Size: Adult   Pulse: 104   Temp: 98.4 °F (36.9 °C)   SpO2: 96%   Weight: (!) 201 kg (444 lb)   Height: 165.1 cm (65\")   PainSc: 0-No pain       Physical Exam   Constitutional: She is oriented to person, place, and time. She is obese.  HENT:   Head: Normocephalic.   Eyes: Pupils are equal, round, and reactive to light. EOM " are normal.   Neurological: She is oriented to person, place, and time. She has normal strength. She has a normal Romberg Test. Gait normal.   Psychiatric: Her speech is normal and behavior is normal. Mood, judgment and thought content normal.   Nursing note and vitals reviewed.      Neurologic Exam     Mental Status   Oriented to person, place, and time.   Attention: normal. Concentration: normal.   Speech: speech is normal   Level of consciousness: alert    Cranial Nerves     CN II   Visual fields full to confrontation.   Visual acuity: normal    CN III, IV, VI   Pupils are equal, round, and reactive to light.  Extraocular motions are normal.   Right pupil: Shape: regular. Reactivity: brisk.   Left pupil: Shape: regular. Reactivity: brisk.   Diplopia: none  Ophthalmoparesis: none  Upgaze: normal  Downgaze: normal    CN V   Facial sensation intact.     CN VII   Facial expression full, symmetric.     CN VIII   CN VIII normal.     CN IX, X   CN IX normal.   CN X normal.     CN XI   CN XI normal.     CN XII   CN XII normal.     Motor Exam   Muscle bulk: normal  Overall muscle tone: normal    Strength   Strength 5/5 throughout.     Sensory Exam   Light touch normal.   Vibration normal.     Gait, Coordination, and Reflexes     Gait  Gait: normal    Coordination   Romberg: negative    Tremor   Resting tremor: absent  Intention tremor: absent    Reflexes   Reflexes 2+ except as noted.         Results Review:   I reviewed the patient's new clinical results.  I have reviewed the patient's other medical records to include, labs, radiology and referrals.     Assessment / Plan      Assessment/Plan:   Vicky was seen today for migraine.    Diagnoses and all orders for this visit:    Intractable chronic migraine without aura and without status migrainosus  -     lamoTRIgine (LaMICtal) 100 MG tablet; Take 1 tablet by mouth 2 (Two) Times a Day for 14 days.  -     ubrogepant (ubrogepant) 50 MG tablet; Take 1 tablet by mouth As  Needed (1 AT ONSET OF MIGRAINE, MAY REPEAT 2 HOURS AFTER IF MIGRAINE HAS SUBSIDED.) for up to 2 doses.  -     propranolol (INDERAL) 40 MG tablet; Take 1 tablet by mouth 3 (Three) Times a Day.  Patient to needs to have frequent headaches and migraine attacks with current medications.  She unfortunately experienced nausea with Nurtec, and has been provided with sample of Ubrelvy today in office.  Dosing and side effects reviewed with patient.  Discussed other medication options, patient is aware that options are somewhat limited given her desire for pregnancy.  She agreed to trial of beta-blocker to reduce headache frequency and intensity.  We reviewed dosing and side effects of this today, patient was cautioned that she may experience some transient dizziness but this can be reduced by drinking plenty of water through the day and rising cautiously from laying or seated positions.  She acknowledged this information.    Other orders  -     Discontinue: ubrogepant (ubrogepant) 50 MG tablet; Take 1 tablet by mouth As Needed (1 AT ONSET OF MIGRAINE, MAY REPEAT 2 HOURS AFTER IF MIGRAINE HAS SUBSIDED. NO MORE THAN 2 DOSES).    Follow Up:   Return in about 6 weeks (around 10/22/2020) for Next scheduled follow up.       Alia Quinonez, FRANCK  [facility]       Please note that portions of this note may have been completed with a voice recognition program. Efforts were made to edit the dictations, but occasionally words are mistranscribed.

## 2020-09-10 NOTE — PATIENT INSTRUCTIONS
You have been given samples of an as-needed migraine medication called Ubrelvy. You may take one pill at the start of the migraine. If this doesn't help enough, you may take 1 more tablet after 2 hours. Do not take more than 2 tablets in 24 hours. Do not take with Nurtec.    We have increased the lamictal to 100 mg twice daily.    You will begin taking propranolol twice daily. This may cause some temporary dizziness, especially when you first stand up. Drink plenty of water while taking this medicine.

## 2020-09-15 ENCOUNTER — OFFICE VISIT (OUTPATIENT)
Dept: BEHAVIORAL HEALTH | Facility: CLINIC | Age: 35
End: 2020-09-15

## 2020-09-15 VITALS
HEART RATE: 135 BPM | DIASTOLIC BLOOD PRESSURE: 84 MMHG | TEMPERATURE: 97.3 F | HEIGHT: 65 IN | BODY MASS INDEX: 48.82 KG/M2 | OXYGEN SATURATION: 92 % | WEIGHT: 293 LBS | SYSTOLIC BLOOD PRESSURE: 136 MMHG

## 2020-09-15 DIAGNOSIS — F43.10 POST TRAUMATIC STRESS DISORDER (PTSD): Primary | ICD-10-CM

## 2020-09-15 DIAGNOSIS — F33.0 MAJOR DEPRESSIVE DISORDER, RECURRENT EPISODE, MILD (HCC): ICD-10-CM

## 2020-09-15 PROCEDURE — 99214 OFFICE O/P EST MOD 30 MIN: CPT | Performed by: NURSE PRACTITIONER

## 2020-09-15 RX ORDER — ZIPRASIDONE HYDROCHLORIDE 40 MG/1
40 CAPSULE ORAL NIGHTLY
Qty: 30 CAPSULE | Refills: 2 | Status: SHIPPED | OUTPATIENT
Start: 2020-09-15 | End: 2020-10-13

## 2020-09-15 RX ORDER — LAMOTRIGINE 100 MG/1
100 TABLET ORAL 2 TIMES DAILY
Qty: 60 TABLET | Refills: 2 | Status: SHIPPED | OUTPATIENT
Start: 2020-09-15 | End: 2020-10-22 | Stop reason: SDUPTHER

## 2020-09-15 NOTE — PROGRESS NOTES
Patient Name: Vicky Lowery  MRN: [id number]   :  1985     Chief Complaint:      ICD-10-CM ICD-9-CM   1. Post traumatic stress disorder (PTSD)  F43.10 309.81   2. Major depressive disorder, recurrent episode, mild (CMS/HCC)  F33.0 296.31       History of Present Illness: Vicky Lowery is a 35 y.o. female medication management and genetic testing review.  Patient states mood has been all over the place.  Sleep has been poor.  Is now up to Lamictal 100 mg twice a day that was originally prescribed for migraines.    The following portions of the patient's history were reviewed and updated as appropriate: allergies, current medications, past family history, past medical history, past social history, past surgical history and problem list.    Review of Systems;;  Review of Systems   Constitutional: Negative for activity change, appetite change, fatigue, unexpected weight gain and unexpected weight loss.   Respiratory: Negative for shortness of breath and wheezing.    Gastrointestinal: Negative for constipation, diarrhea, nausea and vomiting.   Musculoskeletal: Negative for gait problem.   Skin: Negative for dry skin and rash.   Neurological: Negative for dizziness, speech difficulty, weakness, light-headedness, headache, memory problem and confusion.   Psychiatric/Behavioral: Positive for dysphoric mood, sleep disturbance, depressed mood and stress. Negative for agitation, behavioral problems, decreased concentration, hallucinations, self-injury, suicidal ideas and negative for hyperactivity. The patient is nervous/anxious.        Physical Exam;;  Physical Exam  Vitals signs and nursing note reviewed.   Constitutional:       General: She is not in acute distress.     Appearance: She is well-developed. She is not diaphoretic.   HENT:      Head: Normocephalic and atraumatic.   Eyes:      Conjunctiva/sclera: Conjunctivae normal.   Neck:      Musculoskeletal: Full passive range of motion without  "pain and normal range of motion.   Cardiovascular:      Rate and Rhythm: Normal rate.   Pulmonary:      Effort: Pulmonary effort is normal. No respiratory distress.   Musculoskeletal: Normal range of motion.   Skin:     General: Skin is warm and dry.   Neurological:      Mental Status: She is alert and oriented to person, place, and time.   Psychiatric:         Mood and Affect: Mood is anxious and depressed. Affect is not labile, blunt, angry or inappropriate.         Speech: Speech is not rapid and pressured or tangential.         Behavior: Behavior normal. Behavior is not agitated, slowed, aggressive, withdrawn, hyperactive or combative. Behavior is cooperative.         Thought Content: Thought content normal. Thought content is not paranoid or delusional. Thought content does not include homicidal or suicidal ideation. Thought content does not include homicidal or suicidal plan.         Judgment: Judgment normal.       Blood pressure 136/84, pulse (!) 135, temperature 97.3 °F (36.3 °C), height 165.1 cm (65\"), weight (!) 199 kg (438 lb), SpO2 92 %.  Body mass index is 72.89 kg/m².    Current Medications;;    Current Outpatient Medications:   •  b complex vitamins capsule, Take 1 capsule by mouth Daily., Disp: 30 capsule, Rfl: 11  •  clomiPHENE (CLOMID) 50 MG tablet, Take 1 tablet by mouth Daily. Cycle days 3 through 7, Disp: 5 tablet, Rfl: 2  •  hydroCHLOROthiazide (HYDRODIURIL) 25 MG tablet, Take 25 mg by mouth Daily., Disp: , Rfl:   •  hydroxychloroquine (PLAQUENIL) 200 MG tablet, Take  by mouth Daily., Disp: , Rfl:   •  lamoTRIgine (LaMICtal) 100 MG tablet, Take 1 tablet by mouth 2 (Two) Times a Day for 14 days., Disp: 30 tablet, Rfl: 2  •  ubrogepant (ubrogepant) 50 MG tablet, Take 1 tablet by mouth As Needed (1 AT ONSET OF MIGRAINE, MAY REPEAT 2 HOURS AFTER IF MIGRAINE HAS SUBSIDED.) for up to 2 doses., Disp: 2 tablet, Rfl: 0  •  vitamin D (ERGOCALCIFEROL) 1.25 MG (17697 UT) capsule capsule, Take 50,000 Units " by mouth 1 (One) Time Per Week., Disp: , Rfl:   •  propranolol (INDERAL) 40 MG tablet, Take 1 tablet by mouth 3 (Three) Times a Day., Disp: 60 tablet, Rfl: 2  •  ziprasidone (GEODON) 40 MG capsule, Take 1 capsule by mouth Every Night., Disp: 30 capsule, Rfl: 2    Lab Results:   Admission on 08/29/2020, Discharged on 08/29/2020   Component Date Value Ref Range Status   • HCG, Urine QL 08/29/2020 Negative  Negative Final       Mental Status Exam:   Hygiene:   good  Cooperation:  Cooperative  Eye Contact:  Good  Psychomotor Behavior:  Appropriate  Mood:anxious, depressed and dysthymic  Affect:  Appropriate  Hopelessness: Denies  Speech:  Normal  Thought Process:  Goal directed  Thought Content:  Normal  Suicidal:  None  Homicidal:  None  Hallucinations:  None  Delusion:  None  Memory:  Intact  Orientation:  Person, Place, Time and Situation  Reliability:  good  Insight:  Good  Judgement:  Good  Impulse Control:  Good  Physical/Medical Issues:  No     PHQ-9 Depression Screening  Little interest or pleasure in doing things? 1   Feeling down, depressed, or hopeless? 1   Trouble falling or staying asleep, or sleeping too much? 3   Feeling tired or having little energy? 3   Poor appetite or overeating? 1   Feeling bad about yourself - or that you are a failure or have let yourself or your family down? 1   Trouble concentrating on things, such as reading the newspaper or watching television? 1   Moving or speaking so slowly that other people could have noticed? Or the opposite - being so fidgety or restless that you have been moving around a lot more than usual? 0   Thoughts that you would be better off dead, or of hurting yourself in some way? 1   PHQ-9 Total Score 12   If you checked off any problems, how difficult have these problems made it for you to do your work, take care of things at home, or get along with other people?          Assessment/Plan:  Vicky was seen today for follow-up and ptsd.    Diagnoses and all  orders for this visit:    Post traumatic stress disorder (PTSD)  -     ziprasidone (GEODON) 40 MG capsule; Take 1 capsule by mouth Every Night.    Major depressive disorder, recurrent episode, mild (CMS/HCC)  -     ziprasidone (GEODON) 40 MG capsule; Take 1 capsule by mouth Every Night.      Reviewed genetic testing.  Answered patient's questions as they developed.  Add Geodon 40 mg p.o. at bedtime.    A psychological evaluation was conducted in order to assess past and current level of functioning. Areas assessed included, but were not limited to: perception of social support, perception of ability to face and deal with challenges in life (positive functioning), anxiety symptoms, depressive symptoms, perspective on beliefs/belief system, coping skills for stress, intelligence level,  Therapeutic rapport was established. Interventions conducted today were geared towards incorporating medication management along with support for continued therapy. Education was also provided as to the med management with this provider and what to expect in subsequent sessions.    We discussed risks, benefits,goals and side effects of the above medication and the patient was agreeable with the plan.Patient was educated on the importance of compliance with treatment and follow-up appointments. Patient is aware to contact the [facility] with any worsening of symptoms. To call for questions or concerns and return early if necessary. Patent is agreeable to go to the Emergency Department or call 911 should they begin SI/HI.     Treatment Plan:   Discussed risks, benefits, and alternatives of medication. Encouraged healthy habits (eating, exercise and sleep). Call if any questions or problems arise. Medication reconciled. Controlled substance monitoring report reviewed. Provided psychoeducation.. Discussed coping strategies and current stressors. Set appropriate boundaries and limits for patient's well-being. Use distraction techniques  to improve symptoms. Access support networks.      Return in about 4 weeks (around 10/13/2020) for Follow Up 30 min.    Minoo Harrington, APRN

## 2020-09-21 ENCOUNTER — LAB (OUTPATIENT)
Dept: LAB | Facility: HOSPITAL | Age: 35
End: 2020-09-21

## 2020-09-21 DIAGNOSIS — N97.0 FEMALE INFERTILITY ASSOCIATED WITH ANOVULATION: Primary | ICD-10-CM

## 2020-09-21 LAB — ERYTHROCYTE [SEDIMENTATION RATE] IN BLOOD: 31 MM/HR (ref 0–20)

## 2020-09-21 PROCEDURE — 86140 C-REACTIVE PROTEIN: CPT

## 2020-09-21 PROCEDURE — 84550 ASSAY OF BLOOD/URIC ACID: CPT

## 2020-09-21 PROCEDURE — 83516 IMMUNOASSAY NONANTIBODY: CPT

## 2020-09-21 PROCEDURE — 85652 RBC SED RATE AUTOMATED: CPT

## 2020-09-21 PROCEDURE — 36415 COLL VENOUS BLD VENIPUNCTURE: CPT

## 2020-09-21 PROCEDURE — 86235 NUCLEAR ANTIGEN ANTIBODY: CPT

## 2020-09-21 PROCEDURE — 82550 ASSAY OF CK (CPK): CPT

## 2020-09-21 PROCEDURE — 84144 ASSAY OF PROGESTERONE: CPT | Performed by: OBSTETRICS & GYNECOLOGY

## 2020-09-22 LAB
ACTIN IGG SERPL-ACNC: 6 UNITS (ref 0–19)
CK SERPL-CCNC: 59 U/L (ref 20–180)
CRP SERPL-MCNC: 3.7 MG/DL (ref 0–0.5)
PROGEST SERPL-MCNC: 5.09 NG/ML
URATE SERPL-MCNC: 7.1 MG/DL (ref 2.4–5.7)

## 2020-09-23 ENCOUNTER — TELEPHONE (OUTPATIENT)
Dept: NEUROLOGY | Facility: CLINIC | Age: 35
End: 2020-09-23

## 2020-09-23 LAB — HISTONE IGG SER IA-ACNC: 0.5 UNITS (ref 0–0.9)

## 2020-09-23 NOTE — TELEPHONE ENCOUNTER
Received a call from patient who stated that she wanted to make us aware that she has a rash on her arms and spreading a little further and she is not sure if this is caused by one of her medications but she wanted to make us aware of what is going on.     She has already been to PCP and they gave her a cream for now.

## 2020-09-23 NOTE — TELEPHONE ENCOUNTER
She tolerated lamictal in the past, and has been on this for several weeks so I'm not convinced it's the lamictal. If it does not respond to the cream in another 48 hours or so, let us know.

## 2020-09-29 ENCOUNTER — TELEPHONE (OUTPATIENT)
Dept: NEUROLOGY | Facility: CLINIC | Age: 35
End: 2020-09-29

## 2020-09-29 ENCOUNTER — TELEPHONE (OUTPATIENT)
Dept: INTERNAL MEDICINE | Facility: CLINIC | Age: 35
End: 2020-09-29

## 2020-09-29 NOTE — TELEPHONE ENCOUNTER
It's doubtful but possible. It's fine to stop however we need to meet soon to discuss other options to replace it.

## 2020-09-29 NOTE — TELEPHONE ENCOUNTER
Patient contacted office and stated she has developed a rash that itches and was seen by PCP and they stated that they thought Geodon was the cause.     Patient has not taken dose of medication today and I advised her to hold off until you advise further.     Please advise and I will contact patient.

## 2020-09-29 NOTE — TELEPHONE ENCOUNTER
Patient contacted office and states that she has an appointment on 10/13/2020- I advised patient to keep follow up appointment and to contact office if she needs to get in earlier.

## 2020-09-29 NOTE — TELEPHONE ENCOUNTER
PT CALLED REGARDING HER PROPRANOLOL THAT FRANCK KIM PRESCRIBED FOR HER ON 9/10/20. PT SAID DOMINIQUE HAD TOLD HER SHE WOULD TAKE THE MEDICATION TWICE A DAY BUT THE BOTTLE SAYS TO TAKE IT THREE TIMES A DAY. PLEASE ADVISE.     PT ALSO HAD A QUESTION REGARDING HER UBRELVY THAT REQUIRED A PA AND WANTED AN UPDATE ON WHETHER OR NOT IT HAD BEEN APPROVED BY INS.      CALL BACK- [phone]

## 2020-09-29 NOTE — TELEPHONE ENCOUNTER
Please take the propranolol twice a day for one week, then she can increase to three times a day if no significant dizziness.

## 2020-10-01 ENCOUNTER — TELEPHONE (OUTPATIENT)
Dept: INTERNAL MEDICINE | Facility: CLINIC | Age: 35
End: 2020-10-01

## 2020-10-01 NOTE — TELEPHONE ENCOUNTER
Patient contacted office and stated that since she has discontinued Geodon yesterday- she has been super emotional. Patient was actually crying while on the phone.     Patient discontinued medication due to rash and as documented in previous phone encounter- Minoo did not feel medication was the cause. I advised patient that she could continue the medication and follow up with PCP regarding rash or she could discontinue medication and follow up with Minoo at scheduled visit on 10/13.     Patient expressed understanding.

## 2020-10-06 ENCOUNTER — TELEPHONE (OUTPATIENT)
Dept: OBSTETRICS AND GYNECOLOGY | Facility: CLINIC | Age: 35
End: 2020-10-06

## 2020-10-06 NOTE — TELEPHONE ENCOUNTER
----- Message from Terri Rutledge sent at 10/6/2020  9:11 AM EDT -----  Regarding: SYMPTOMS  Contact: PT  THIS IS DR FLORENTINO'S PT.  SHE CALLED STATING SHE IS HAVING SOME IRREGULAR BLEEDING.  SHE HAD A SHORTER MENSES LAST MONTH, BUT HAD NEGATIVE UPT.  SHE ASKED IF SHE NEEDS TO BE SEEN OR IF THIS IS NORMAL WITH CLOMID.  THANKS

## 2020-10-07 DIAGNOSIS — Z32.00 POSSIBLE PREGNANCY, NOT CONFIRMED: Primary | ICD-10-CM

## 2020-10-08 LAB — HCG INTACT+B SERPL-ACNC: <0.5 MIU/ML

## 2020-10-13 ENCOUNTER — OFFICE VISIT (OUTPATIENT)
Dept: BEHAVIORAL HEALTH | Facility: CLINIC | Age: 35
End: 2020-10-13

## 2020-10-13 DIAGNOSIS — F31.81 BIPOLAR II DISORDER (HCC): ICD-10-CM

## 2020-10-13 DIAGNOSIS — F33.0 MAJOR DEPRESSIVE DISORDER, RECURRENT EPISODE, MILD (HCC): ICD-10-CM

## 2020-10-13 DIAGNOSIS — F43.10 POST TRAUMATIC STRESS DISORDER (PTSD): Primary | ICD-10-CM

## 2020-10-13 PROCEDURE — 99442 PR PHYS/QHP TELEPHONE EVALUATION 11-20 MIN: CPT | Performed by: NURSE PRACTITIONER

## 2020-10-13 RX ORDER — LURASIDONE HYDROCHLORIDE 40 MG/1
40 TABLET, FILM COATED ORAL DAILY
Qty: 30 TABLET | Refills: 1 | Status: SHIPPED | OUTPATIENT
Start: 2020-10-13 | End: 2020-11-19 | Stop reason: DRUGHIGH

## 2020-10-13 RX ORDER — RIMEGEPANT SULFATE 75 MG/75MG
TABLET, ORALLY DISINTEGRATING ORAL
COMMUNITY
Start: 2020-09-15 | End: 2020-10-22 | Stop reason: CLARIF

## 2020-10-13 NOTE — PROGRESS NOTES
Patient Name: Vicky Lowery  MRN: [id number]   :  1985     Chief Complaint:      ICD-10-CM ICD-9-CM   1. Post traumatic stress disorder (PTSD)  F43.10 309.81   2. Major depressive disorder, recurrent episode, mild (CMS/[facility])  F33.0 296.31   3. Bipolar II disorder (CMS/[facility])  F31.81 296.89       History of Present Illness: Vicky Lowery is a 35 y.o. female is here today for medication management follow up.  Patient states she notices no improvement with the Geodon.  States she is all over the place with her mood and emotions.  Still irritable and depressed.  Not getting any good sleep.    The following portions of the patient's history were reviewed and updated as appropriate: allergies, current medications, past family history, past medical history, past social history, past surgical history and problem list.    Review of Systems;;  Review of Systems   Constitutional: Negative for activity change, appetite change, fatigue, unexpected weight gain and unexpected weight loss.   Respiratory: Negative for shortness of breath and wheezing.    Gastrointestinal: Negative for constipation, diarrhea, nausea and vomiting.   Musculoskeletal: Negative for gait problem.   Skin: Negative for dry skin and rash.   Neurological: Negative for dizziness, speech difficulty, weakness, light-headedness, headache, memory problem and confusion.   Psychiatric/Behavioral: Positive for dysphoric mood, sleep disturbance, depressed mood and stress. Negative for agitation, behavioral problems, decreased concentration, hallucinations, self-injury, suicidal ideas and negative for hyperactivity. The patient is nervous/anxious.        There were no vitals taken for this visit.  There is no height or weight on file to calculate BMI.    Current Medications;;    Current Outpatient Medications:   •  b complex vitamins capsule, Take 1 capsule by mouth Daily., Disp: 30 capsule, Rfl: 11  •  clomiPHENE (CLOMID) 50 MG tablet, Take 1  tablet by mouth Daily. Cycle days 3 through 7, Disp: 5 tablet, Rfl: 2  •  hydroCHLOROthiazide (HYDRODIURIL) 25 MG tablet, Take 25 mg by mouth Daily., Disp: , Rfl:   •  hydroxychloroquine (PLAQUENIL) 200 MG tablet, Take  by mouth Daily., Disp: , Rfl:   •  lamoTRIgine (LaMICtal) 100 MG tablet, Take 1 tablet by mouth 2 (Two) Times a Day., Disp: 60 tablet, Rfl: 2  •  propranolol (INDERAL) 40 MG tablet, Take 1 tablet by mouth 3 (Three) Times a Day., Disp: 60 tablet, Rfl: 2  •  rimegepant 75 MG dispersible tablet, DISSOLVE ONE TABLET BY MOUTH AS NEEDED for up to 1 dose, Disp: , Rfl:   •  vitamin D (ERGOCALCIFEROL) 1.25 MG (84432 UT) capsule capsule, Take 50,000 Units by mouth 1 (One) Time Per Week., Disp: , Rfl:   •  lurasidone (Latuda) 40 MG tablet tablet, Take 1 tablet by mouth Daily. Take 40 mg orally daily with a meal, Disp: 30 tablet, Rfl: 1    Lab Results:   Orders Only on 10/07/2020   Component Date Value Ref Range Status   • HCG Quantitative 10/07/2020 <0.50  mIU/mL Final    Comment: Results may be falsely decreased if patient taking Biotin.  HCG Ranges by Gestational Age  Females - non-pregnant premenopausal   </= 1mIU/mL HCG  Females - postmenopausal               </= 7mIU/mL HCG  3 Weeks         5.4 -      72 mIU/mL  4 Weeks        10.2 -     708 mIU/mL  5 Weeks       217   -   8,245 mIU/mL  6 Weeks       152   -  32,177 mIU/mL  7 Weeks     4,059   - 153,767 mIU/mL  8 Weeks    31,366   - 149,094 mIU/mL  9 Weeks    59,109   - 135,901 mIU/mL  10 Weeks   44,186   - 170,409 mIU/mL  12 Weeks   27,107   - 201,615 mIU/mL  14 Weeks   24,302   -  93,646 mIU/mL  15 Weeks   12,540   -  69,747 mIU/mL  16 Weeks    8,904   -  55,332 mIU/mL  17 Weeks    8,240   -  51,793 mIU/mL  18 Weeks    9,649   -  55,271 mIU/mL     Orders Only on 09/21/2020   Component Date Value Ref Range Status   • Progesterone 09/21/2020 5.09  ng/mL Final   Admission on 08/29/2020, Discharged on 08/29/2020   Component Date Value Ref Range Status   •  HCG, Urine QL 08/29/2020 Negative  Negative Final       Mental Status Exam:   Hygiene:   tele  Cooperation:  Cooperative  Eye Contact:  tele  Psychomotor Behavior:  tele  Mood:anxious, depressed, dysthymic and sad  Affect:  Full range  Hopelessness: 5  Speech:  Normal  Thought Process:  Goal directed  Thought Content:  Normal  Suicidal:  None  Homicidal:  None  Hallucinations:  None  Delusion:  None  Memory:  Intact  Orientation:  Person, Place, Time and Situation  Reliability:  good  Insight:  Good  Judgement:  Good  Impulse Control:  Good  Physical/Medical Issues:  No     PHQ-9 Depression Screening  Little interest or pleasure in doing things? 1   Feeling down, depressed, or hopeless? 1   Trouble falling or staying asleep, or sleeping too much? 3   Feeling tired or having little energy? 3   Poor appetite or overeating? 1   Feeling bad about yourself - or that you are a failure or have let yourself or your family down? 1   Trouble concentrating on things, such as reading the newspaper or watching television? 1   Moving or speaking so slowly that other people could have noticed? Or the opposite - being so fidgety or restless that you have been moving around a lot more than usual? 0   Thoughts that you would be better off dead, or of hurting yourself in some way? 1   PHQ-9 Total Score 12   If you checked off any problems, how difficult have these problems made it for you to do your work, take care of things at home, or get along with other people?          Assessment/Plan:  Diagnoses and all orders for this visit:    1. Post traumatic stress disorder (PTSD) (Primary)    2. Major depressive disorder, recurrent episode, mild (CMS/HCC)    3. Bipolar II disorder (CMS/HCC)  -     lurasidone (Latuda) 40 MG tablet tablet; Take 1 tablet by mouth Daily. Take 40 mg orally daily with a meal  Dispense: 30 tablet; Refill: 1      Discontinue Geodon.  Start Latuda 40 mg p.o. daily with a meal.  Encouraged to get melatonin 5 mg p.o.  nightly at the store.  Telephone appointment start time 1:40 PM.  End time 1:51 PM.    A psychological evaluation was conducted in order to assess past and current level of functioning. Areas assessed included, but were not limited to: perception of social support, perception of ability to face and deal with challenges in life (positive functioning), anxiety symptoms, depressive symptoms, perspective on beliefs/belief system, coping skills for stress, intelligence level,  Therapeutic rapport was established. Interventions conducted today were geared towards incorporating medication management along with support for continued therapy. Education was also provided as to the med management with this provider and what to expect in subsequent sessions.    We discussed risks, benefits,goals and side effects of the above medication and the patient was agreeable with the plan.Patient was educated on the importance of compliance with treatment and follow-up appointments. Patient is aware to contact the [facility] with any worsening of symptoms. To call for questions or concerns and return early if necessary. Patent is agreeable to go to the Emergency Department or call 911 should they begin SI/HI.     Treatment Plan:   Discussed risks, benefits, and alternatives of medication. Encouraged healthy habits (eating, exercise and sleep). Call if any questions or problems arise. Medication reconciled. Controlled substance monitoring report reviewed. Provided psychoeducation.. Discussed coping strategies and current stressors. Set appropriate boundaries and limits for patient's well-being. Use distraction techniques to improve symptoms. Access support networks.      Return in about 4 weeks (around 11/10/2020) for Follow Up telephpne appt.    FRANCK Segovia

## 2020-10-13 NOTE — PROGRESS NOTES
You have chosen to receive care through a telephone visit. Do you consent to use a telephone visit for your medical care today? Yes    Individuals involved in this encounter::    MAHAMED Sandhu APRN

## 2020-10-16 ENCOUNTER — PRIOR AUTHORIZATION (OUTPATIENT)
Dept: INTERNAL MEDICINE | Facility: CLINIC | Age: 35
End: 2020-10-16

## 2020-10-22 ENCOUNTER — TELEMEDICINE (OUTPATIENT)
Dept: NEUROLOGY | Facility: CLINIC | Age: 35
End: 2020-10-22

## 2020-10-22 DIAGNOSIS — G43.719 INTRACTABLE CHRONIC MIGRAINE WITHOUT AURA AND WITHOUT STATUS MIGRAINOSUS: Primary | ICD-10-CM

## 2020-10-22 PROCEDURE — 99213 OFFICE O/P EST LOW 20 MIN: CPT | Performed by: NURSE PRACTITIONER

## 2020-10-22 RX ORDER — PROPRANOLOL HYDROCHLORIDE 60 MG/1
60 TABLET ORAL 2 TIMES DAILY
Qty: 60 TABLET | Refills: 2 | Status: SHIPPED | OUTPATIENT
Start: 2020-10-22 | End: 2021-02-05

## 2020-10-22 RX ORDER — LAMOTRIGINE 100 MG/1
TABLET ORAL
Qty: 90 TABLET | Refills: 2 | Status: SHIPPED | OUTPATIENT
Start: 2020-10-22 | End: 2020-12-17 | Stop reason: SDUPTHER

## 2020-10-22 NOTE — PROGRESS NOTES
Follow Up Neurology Office Visit      Patient Name: Vicky Lowery    Referring Physician: No ref. provider found    Chief Complaint:  No chief complaint on file.      History of Present Illness: Vicky Lowery is a 35 y.o. female who is here to follow up with Neurology for migraine headaches.  Now with 1-2 MHD /week. Nurtec not helpful for migraine, felt that it was fatiguing and incomplete relief. Improved response with Ubrelvy. Taking Lamictal, propranolol but admits that she often forgets evening dose.    The following portions of the patient's history were reviewed and updated as appropriate: allergies, current medications, past family history, past medical history, past social history, past surgical history and problem list.    Subjective     Review of Systems:   Review of Systems   Constitutional: Positive for fatigue.   Neurological: Positive for headache.     Medications:     Current Outpatient Medications:   •  b complex vitamins capsule, Take 1 capsule by mouth Daily., Disp: 30 capsule, Rfl: 11  •  clomiPHENE (CLOMID) 50 MG tablet, TAKE ONE TABLET BY MOUTH EVERY DAY ON CYCLE DAYS 3-7, Disp: 5 tablet, Rfl: 2  •  hydroCHLOROthiazide (HYDRODIURIL) 25 MG tablet, Take 25 mg by mouth Daily., Disp: , Rfl:   •  hydroxychloroquine (PLAQUENIL) 200 MG tablet, Take  by mouth Daily., Disp: , Rfl:   •  lamoTRIgine (LaMICtal) 100 MG tablet, Take 2 tablets by mouth Every Morning AND 1 tablet Every Evening., Disp: 90 tablet, Rfl: 2  •  lurasidone (Latuda) 40 MG tablet tablet, Take 1 tablet by mouth Daily. Take 40 mg orally daily with a meal, Disp: 30 tablet, Rfl: 1  •  propranolol (INDERAL) 60 MG tablet, Take 1 tablet by mouth 2 (Two) Times a Day., Disp: 60 tablet, Rfl: 2  •  ubrogepant (ubrogepant) 50 MG tablet, Take 1 tablet by mouth 1 (One) Time As Needed (migraine) for up to 2 doses., Disp: 10 tablet, Rfl: 2  •  vitamin D (ERGOCALCIFEROL) 1.25 MG (84446 UT) capsule capsule, Take 50,000 Units by  mouth 1 (One) Time Per Week., Disp: , Rfl:     Allergies:   Allergies   Allergen Reactions   • Triptans Swelling     Felt like throat was closing up   • Nsaids Other (See Comments)     Developed renal insufficiency after long term use       Objective     Physical Exam:  Vital Signs: There were no vitals filed for this visit.    Physical Exam  Pulmonary:      Effort: Pulmonary effort is normal.   Neurological:      General: No focal deficit present.      Mental Status: She is oriented to person, place, and time. Mental status is at baseline.   Psychiatric:         Mood and Affect: Mood normal.         Speech: Speech normal.         Behavior: Behavior normal.         Thought Content: Thought content normal.         Judgment: Judgment normal.       Neurologic Exam     Mental Status   Oriented to person, place, and time.   Attention: normal. Concentration: normal.   Speech: speech is normal   Level of consciousness: alert  Knowledge: consistent with education.   Normal comprehension.     Results Review:   I reviewed the patient's new clinical results.  I have reviewed the patient's other medical records to include, labs, radiology and referrals.     Assessment / Plan      Assessment/Plan:   Diagnoses and all orders for this visit:    1. Intractable chronic migraine without aura and without status migrainosus (Primary)  -     ubrogepant (ubrogepant) 50 MG tablet; Take 1 tablet by mouth 1 (One) Time As Needed (migraine) for up to 2 doses.  Dispense: 10 tablet; Refill: 2  -     lamoTRIgine (LaMICtal) 100 MG tablet; Take 2 tablets by mouth Every Morning AND 1 tablet Every Evening.  Dispense: 90 tablet; Refill: 2  -     propranolol (INDERAL) 60 MG tablet; Take 1 tablet by mouth 2 (Two) Times a Day.  Dispense: 60 tablet; Refill: 2    Patient has had some improvement on current medication regimen of lamotrigine and propranolol.  She is still actively planning pregnancy, which does limit medication options.  She admits that she  does often forget evening doses of medications.  I have increased Lamictal to 200 mg every morning and 100 mg every afternoon.  She will also increase propranolol dose to 60 mg twice daily.  She experienced better relief of acute migraine episodes with Ubrelvy, prescription provided today.    Follow Up:   Return in about 3 months (around 1/22/2021) for Next scheduled follow up.    This was an audio and video enabled telemedicine encounter lasting 11 minutes.   FRANCK Peña  [facility]       Please note that portions of this note may have been completed with a voice recognition program. Efforts were made to edit the dictations, but occasionally words are mistranscribed.

## 2020-11-04 ENCOUNTER — TELEPHONE (OUTPATIENT)
Dept: OBSTETRICS AND GYNECOLOGY | Facility: CLINIC | Age: 35
End: 2020-11-04

## 2020-11-04 NOTE — TELEPHONE ENCOUNTER
Spoke with Dr Guillen, he advised patient's  is to start with PCP and have semen analysis, depending on those results will need appointment with urologist.

## 2020-11-04 NOTE — TELEPHONE ENCOUNTER
----- Message from Marie Loyd sent at 11/4/2020  3:01 PM EST -----  Regarding: LABS FOR   Patient is requesting a return call as to what Dr. Guillen is wanting urologist to check for in her husbands labs.    Patient call back: [phone]

## 2020-11-05 DIAGNOSIS — G43.719 INTRACTABLE CHRONIC MIGRAINE WITHOUT AURA AND WITHOUT STATUS MIGRAINOSUS: ICD-10-CM

## 2020-11-05 RX ORDER — RIMEGEPANT SULFATE 75 MG/75MG
TABLET, ORALLY DISINTEGRATING ORAL
Qty: 10 TABLET | Refills: 2 | OUTPATIENT
Start: 2020-11-05

## 2020-11-11 ENCOUNTER — HOSPITAL ENCOUNTER (OUTPATIENT)
Facility: HOSPITAL | Age: 35
Setting detail: HOSPITAL OUTPATIENT SURGERY
Discharge: HOME | End: 2020-11-11
Attending: ANESTHESIOLOGY | Admitting: ANESTHESIOLOGY

## 2020-11-11 PROBLEM — M47.816 LUMBAR SPONDYLOSIS: Status: ACTIVE | Noted: 2020-11-11

## 2020-11-16 ENCOUNTER — TELEPHONE (OUTPATIENT)
Dept: OBSTETRICS AND GYNECOLOGY | Facility: CLINIC | Age: 35
End: 2020-11-16

## 2020-11-16 DIAGNOSIS — Z31.9 INFERTILITY MANAGEMENT: Primary | ICD-10-CM

## 2020-11-16 NOTE — TELEPHONE ENCOUNTER
----- Message from Terri Rutledge sent at 11/16/2020  2:02 PM EST -----  Regarding: DAY 21 PROGESTERONE  Contact: PT  THIS IS DR FLORENTINO'S PT.  SHE NEEDS ORDER FOR DAY 21 PROGESTERONE.  SHE IS COMING IN TODAY.  THANKS

## 2020-11-17 ENCOUNTER — LAB (OUTPATIENT)
Dept: LAB | Facility: HOSPITAL | Age: 35
End: 2020-11-17

## 2020-11-17 ENCOUNTER — TRANSCRIBE ORDERS (OUTPATIENT)
Dept: ADMINISTRATIVE | Facility: HOSPITAL | Age: 35
End: 2020-11-17

## 2020-11-17 DIAGNOSIS — N18.2 CHRONIC KIDNEY DISEASE, STAGE II (MILD): ICD-10-CM

## 2020-11-17 DIAGNOSIS — M83.9 PSEUDOFRACTURE: ICD-10-CM

## 2020-11-17 DIAGNOSIS — M79.10 MYALGIA: ICD-10-CM

## 2020-11-17 DIAGNOSIS — M06.09 RHEUMATOID ARTHRITIS OF MULTIPLE SITES WITHOUT RHEUMATOID FACTOR (HCC): Primary | ICD-10-CM

## 2020-11-17 DIAGNOSIS — M06.09 RHEUMATOID ARTHRITIS OF MULTIPLE SITES WITHOUT RHEUMATOID FACTOR (HCC): ICD-10-CM

## 2020-11-17 LAB
ALBUMIN SERPL-MCNC: 3.6 G/DL (ref 3.5–5.2)
ALBUMIN/GLOB SERPL: 1.2 G/DL
ALP SERPL-CCNC: 60 U/L (ref 39–117)
ALT SERPL W P-5'-P-CCNC: 19 U/L (ref 1–33)
ANION GAP SERPL CALCULATED.3IONS-SCNC: 10.4 MMOL/L (ref 5–15)
AST SERPL-CCNC: 21 U/L (ref 1–32)
BASOPHILS # BLD AUTO: 0.05 10*3/MM3 (ref 0–0.2)
BASOPHILS NFR BLD AUTO: 0.8 % (ref 0–1.5)
BILIRUB SERPL-MCNC: 0.3 MG/DL (ref 0–1.2)
BUN SERPL-MCNC: 14 MG/DL (ref 6–20)
BUN/CREAT SERPL: 16.5 (ref 7–25)
CALCIUM SPEC-SCNC: 8.6 MG/DL (ref 8.6–10.5)
CHLORIDE SERPL-SCNC: 105 MMOL/L (ref 98–107)
CO2 SERPL-SCNC: 24.6 MMOL/L (ref 22–29)
CREAT SERPL-MCNC: 0.85 MG/DL (ref 0.57–1)
CRP SERPL-MCNC: 0.91 MG/DL (ref 0–0.5)
DEPRECATED RDW RBC AUTO: 43.3 FL (ref 37–54)
EOSINOPHIL # BLD AUTO: 0.31 10*3/MM3 (ref 0–0.4)
EOSINOPHIL NFR BLD AUTO: 4.8 % (ref 0.3–6.2)
ERYTHROCYTE [DISTWIDTH] IN BLOOD BY AUTOMATED COUNT: 13 % (ref 12.3–15.4)
ERYTHROCYTE [SEDIMENTATION RATE] IN BLOOD: 16 MM/HR (ref 0–20)
GFR SERPL CREATININE-BSD FRML MDRD: 76 ML/MIN/1.73
GLOBULIN UR ELPH-MCNC: 3 GM/DL
GLUCOSE SERPL-MCNC: 95 MG/DL (ref 65–99)
HCT VFR BLD AUTO: 38.4 % (ref 34–46.6)
HGB BLD-MCNC: 13.1 G/DL (ref 12–15.9)
IMM GRANULOCYTES # BLD AUTO: 0.03 10*3/MM3 (ref 0–0.05)
IMM GRANULOCYTES NFR BLD AUTO: 0.5 % (ref 0–0.5)
LYMPHOCYTES # BLD AUTO: 1.81 10*3/MM3 (ref 0.7–3.1)
LYMPHOCYTES NFR BLD AUTO: 28.1 % (ref 19.6–45.3)
MCH RBC QN AUTO: 31.3 PG (ref 26.6–33)
MCHC RBC AUTO-ENTMCNC: 34.1 G/DL (ref 31.5–35.7)
MCV RBC AUTO: 91.9 FL (ref 79–97)
MONOCYTES # BLD AUTO: 0.85 10*3/MM3 (ref 0.1–0.9)
MONOCYTES NFR BLD AUTO: 13.2 % (ref 5–12)
NEUTROPHILS NFR BLD AUTO: 3.39 10*3/MM3 (ref 1.7–7)
NEUTROPHILS NFR BLD AUTO: 52.6 % (ref 42.7–76)
NRBC BLD AUTO-RTO: 0 /100 WBC (ref 0–0.2)
PLATELET # BLD AUTO: 188 10*3/MM3 (ref 140–450)
PMV BLD AUTO: 11.8 FL (ref 6–12)
POTASSIUM SERPL-SCNC: 3.8 MMOL/L (ref 3.5–5.2)
PROT SERPL-MCNC: 6.6 G/DL (ref 6–8.5)
RBC # BLD AUTO: 4.18 10*6/MM3 (ref 3.77–5.28)
SODIUM SERPL-SCNC: 140 MMOL/L (ref 136–145)
WBC # BLD AUTO: 6.44 10*3/MM3 (ref 3.4–10.8)

## 2020-11-17 PROCEDURE — 80053 COMPREHEN METABOLIC PANEL: CPT

## 2020-11-17 PROCEDURE — 85025 COMPLETE CBC W/AUTO DIFF WBC: CPT

## 2020-11-17 PROCEDURE — 86140 C-REACTIVE PROTEIN: CPT

## 2020-11-17 PROCEDURE — 36415 COLL VENOUS BLD VENIPUNCTURE: CPT

## 2020-11-17 PROCEDURE — 85652 RBC SED RATE AUTOMATED: CPT

## 2020-11-18 LAB — PROGEST SERPL-MCNC: 10.2 NG/ML

## 2020-11-19 ENCOUNTER — OFFICE VISIT (OUTPATIENT)
Dept: BEHAVIORAL HEALTH | Facility: CLINIC | Age: 35
End: 2020-11-19

## 2020-11-19 DIAGNOSIS — F43.10 POST TRAUMATIC STRESS DISORDER (PTSD): Primary | ICD-10-CM

## 2020-11-19 DIAGNOSIS — F31.81 BIPOLAR II DISORDER (HCC): ICD-10-CM

## 2020-11-19 PROCEDURE — 99213 OFFICE O/P EST LOW 20 MIN: CPT | Performed by: NURSE PRACTITIONER

## 2020-11-19 RX ORDER — LURASIDONE HYDROCHLORIDE 60 MG/1
TABLET, FILM COATED ORAL
Qty: 30 TABLET | Refills: 3 | Status: SHIPPED | OUTPATIENT
Start: 2020-11-19 | End: 2021-01-13

## 2020-11-19 RX ORDER — HYDROXYZINE PAMOATE 25 MG/1
25 CAPSULE ORAL 3 TIMES DAILY PRN
COMMUNITY
Start: 2020-09-29 | End: 2020-12-17 | Stop reason: SDUPTHER

## 2020-11-19 RX ORDER — ZIPRASIDONE HYDROCHLORIDE 40 MG/1
40 CAPSULE ORAL NIGHTLY
COMMUNITY
Start: 2020-10-13 | End: 2020-11-19

## 2020-11-19 NOTE — PROGRESS NOTES
Patient Name: Vicky Lowery  MRN: [id number]   :  1985     Chief Complaint:      ICD-10-CM ICD-9-CM   1. Post traumatic stress disorder (PTSD)  F43.10 309.81   2. Bipolar II disorder (CMS/Formerly [facility])  F31.81 296.89       History of Present Illness: Vicky Lowery is a 35 y.o. female is here today for medication management follow up.  Patient states she has noticed an improvement with depression.  States she still has some occasions of feeling overwhelmed with the emotions.  Does feel like this is better.  Has a little difficulty falling asleep however staying asleep has been better.  Has had a moment of fixating on a thought and needing to follow through with checking regarding the thought.  States this used to happen more often this is the only time it happened recently.    The following portions of the patient's history were reviewed and updated as appropriate: allergies, current medications, past family history, past medical history, past social history, past surgical history and problem list.    Review of Systems;;  Review of Systems   Constitutional: Negative for activity change, appetite change, fatigue, unexpected weight gain and unexpected weight loss.   Respiratory: Negative for shortness of breath and wheezing.    Gastrointestinal: Negative for constipation, diarrhea, nausea and vomiting.   Musculoskeletal: Negative for gait problem.   Skin: Negative for dry skin and rash.   Neurological: Negative for dizziness, speech difficulty, weakness, light-headedness, headache, memory problem and confusion.   Psychiatric/Behavioral: Positive for dysphoric mood, sleep disturbance, depressed mood and stress. Negative for agitation, behavioral problems, decreased concentration, hallucinations, self-injury, suicidal ideas and negative for hyperactivity. The patient is nervous/anxious.          There were no vitals taken for this visit.  There is no height or weight on file to calculate BMI.    Current  Medications;;    Current Outpatient Medications:   •  b complex vitamins capsule, Take 1 capsule by mouth Daily., Disp: 30 capsule, Rfl: 11  •  clomiPHENE (CLOMID) 50 MG tablet, TAKE ONE TABLET BY MOUTH EVERY DAY ON CYCLE DAYS 3-7, Disp: 5 tablet, Rfl: 2  •  hydroCHLOROthiazide (HYDRODIURIL) 25 MG tablet, Take 25 mg by mouth Daily., Disp: , Rfl:   •  hydroxychloroquine (PLAQUENIL) 200 MG tablet, Take  by mouth Daily., Disp: , Rfl:   •  lamoTRIgine (LaMICtal) 100 MG tablet, Take 2 tablets by mouth Every Morning AND 1 tablet Every Evening., Disp: 90 tablet, Rfl: 2  •  propranolol (INDERAL) 60 MG tablet, Take 1 tablet by mouth 2 (Two) Times a Day., Disp: 60 tablet, Rfl: 2  •  vitamin D (ERGOCALCIFEROL) 1.25 MG (35590 UT) capsule capsule, Take 50,000 Units by mouth 1 (One) Time Per Week., Disp: , Rfl:   •  hydrOXYzine pamoate (VISTARIL) 25 MG capsule, Take 25 mg by mouth 3 (Three) Times a Day As Needed., Disp: , Rfl:   •  lurasidone HCl (Latuda) 60 MG tablet tablet, Take 60 mg orally daily with a meal., Disp: 30 tablet, Rfl: 3    Lab Results:   Lab on 11/17/2020   Component Date Value Ref Range Status   • Glucose 11/17/2020 95  65 - 99 mg/dL Final   • BUN 11/17/2020 14  6 - 20 mg/dL Final   • Creatinine 11/17/2020 0.85  0.57 - 1.00 mg/dL Final   • Sodium 11/17/2020 140  136 - 145 mmol/L Final   • Potassium 11/17/2020 3.8  3.5 - 5.2 mmol/L Final    Slight hemolysis detected by analyzer. Results may be affected.   • Chloride 11/17/2020 105  98 - 107 mmol/L Final   • CO2 11/17/2020 24.6  22.0 - 29.0 mmol/L Final   • Calcium 11/17/2020 8.6  8.6 - 10.5 mg/dL Final   • Total Protein 11/17/2020 6.6  6.0 - 8.5 g/dL Final   • Albumin 11/17/2020 3.60  3.50 - 5.20 g/dL Final   • ALT (SGPT) 11/17/2020 19  1 - 33 U/L Final   • AST (SGOT) 11/17/2020 21  1 - 32 U/L Final   • Alkaline Phosphatase 11/17/2020 60  39 - 117 U/L Final   • Total Bilirubin 11/17/2020 0.3  0.0 - 1.2 mg/dL Final   • eGFR Non African Amer 11/17/2020 76  >60  mL/min/1.73 Final   • Globulin 11/17/2020 3.0  gm/dL Final   • A/G Ratio 11/17/2020 1.2  g/dL Final   • BUN/Creatinine Ratio 11/17/2020 16.5  7.0 - 25.0 Final   • Anion Gap 11/17/2020 10.4  5.0 - 15.0 mmol/L Final   • C-Reactive Protein 11/17/2020 0.91* 0.00 - 0.50 mg/dL Final   • Sed Rate 11/17/2020 16  0 - 20 mm/hr Final   • WBC 11/17/2020 6.44  3.40 - 10.80 10*3/mm3 Final   • RBC 11/17/2020 4.18  3.77 - 5.28 10*6/mm3 Final   • Hemoglobin 11/17/2020 13.1  12.0 - 15.9 g/dL Final   • Hematocrit 11/17/2020 38.4  34.0 - 46.6 % Final   • MCV 11/17/2020 91.9  79.0 - 97.0 fL Final   • MCH 11/17/2020 31.3  26.6 - 33.0 pg Final   • MCHC 11/17/2020 34.1  31.5 - 35.7 g/dL Final   • RDW 11/17/2020 13.0  12.3 - 15.4 % Final   • RDW-SD 11/17/2020 43.3  37.0 - 54.0 fl Final   • MPV 11/17/2020 11.8  6.0 - 12.0 fL Final   • Platelets 11/17/2020 188  140 - 450 10*3/mm3 Final   • Neutrophil % 11/17/2020 52.6  42.7 - 76.0 % Final   • Lymphocyte % 11/17/2020 28.1  19.6 - 45.3 % Final   • Monocyte % 11/17/2020 13.2* 5.0 - 12.0 % Final   • Eosinophil % 11/17/2020 4.8  0.3 - 6.2 % Final   • Basophil % 11/17/2020 0.8  0.0 - 1.5 % Final   • Immature Grans % 11/17/2020 0.5  0.0 - 0.5 % Final   • Neutrophils, Absolute 11/17/2020 3.39  1.70 - 7.00 10*3/mm3 Final   • Lymphocytes, Absolute 11/17/2020 1.81  0.70 - 3.10 10*3/mm3 Final   • Monocytes, Absolute 11/17/2020 0.85  0.10 - 0.90 10*3/mm3 Final   • Eosinophils, Absolute 11/17/2020 0.31  0.00 - 0.40 10*3/mm3 Final   • Basophils, Absolute 11/17/2020 0.05  0.00 - 0.20 10*3/mm3 Final   • Immature Grans, Absolute 11/17/2020 0.03  0.00 - 0.05 10*3/mm3 Final   • nRBC 11/17/2020 0.0  0.0 - 0.2 /100 WBC Final   Telephone on 11/16/2020   Component Date Value Ref Range Status   • Progesterone 11/17/2020 10.2  ng/mL Final    Comment:                      Follicular phase       0.1 -   0.9                       Luteal phase           1.8 -  23.9                       Ovulation phase        0.1 -   12.0                       Pregnant                          First trimester    11.0 -  44.3                          Second trimester   25.4 -  83.3                          Third trimester    58.7 - 214.0                       Postmenopausal         0.0 -   0.1     Orders Only on 10/07/2020   Component Date Value Ref Range Status   • HCG Quantitative 10/07/2020 <0.50  mIU/mL Final    Comment: Results may be falsely decreased if patient taking Biotin.  HCG Ranges by Gestational Age  Females - non-pregnant premenopausal   </= 1mIU/mL HCG  Females - postmenopausal               </= 7mIU/mL HCG  3 Weeks         5.4 -      72 mIU/mL  4 Weeks        10.2 -     708 mIU/mL  5 Weeks       217   -   8,245 mIU/mL  6 Weeks       152   -  32,177 mIU/mL  7 Weeks     4,059   - 153,767 mIU/mL  8 Weeks    31,366   - 149,094 mIU/mL  9 Weeks    59,109   - 135,901 mIU/mL  10 Weeks   44,186   - 170,409 mIU/mL  12 Weeks   27,107   - 201,615 mIU/mL  14 Weeks   24,302   -  93,646 mIU/mL  15 Weeks   12,540   -  69,747 mIU/mL  16 Weeks    8,904   -  55,332 mIU/mL  17 Weeks    8,240   -  51,793 mIU/mL  18 Weeks    9,649   -  55,271 mIU/mL     Orders Only on 09/21/2020   Component Date Value Ref Range Status   • Progesterone 09/21/2020 5.09  ng/mL Final   Admission on 08/29/2020, Discharged on 08/29/2020   Component Date Value Ref Range Status   • HCG, Urine QL 08/29/2020 Negative  Negative Final       Mental Status Exam:   Hygiene:   tele  Cooperation:  Cooperative  Eye Contact:  tele  Psychomotor Behavior:  tele  Mood:anxious and depressed  Affect:  Appropriate  Hopelessness: Denies  Speech:  Normal  Thought Process:  Goal directed  Thought Content:  Normal  Suicidal:  None  Homicidal:  None  Hallucinations:  None  Delusion:  None  Memory:  Intact  Orientation:  Person, Place, Time and Situation  Reliability:  good  Insight:  Good  Judgement:  Good  Impulse Control:  Good  Physical/Medical Issues:  No     PHQ-9 Depression Screening  Little  interest or pleasure in doing things? 1   Feeling down, depressed, or hopeless? 1   Trouble falling or staying asleep, or sleeping too much? 3   Feeling tired or having little energy? 3   Poor appetite or overeating? 0   Feeling bad about yourself - or that you are a failure or have let yourself or your family down? 1   Trouble concentrating on things, such as reading the newspaper or watching television? 0   Moving or speaking so slowly that other people could have noticed? Or the opposite - being so fidgety or restless that you have been moving around a lot more than usual? 0   Thoughts that you would be better off dead, or of hurting yourself in some way? 0   PHQ-9 Total Score 9   If you checked off any problems, how difficult have these problems made it for you to do your work, take care of things at home, or get along with other people?          Assessment/Plan:  Diagnoses and all orders for this visit:    1. Post traumatic stress disorder (PTSD) (Primary)  -     lurasidone HCl (Latuda) 60 MG tablet tablet; Take 60 mg orally daily with a meal.  Dispense: 30 tablet; Refill: 3    2. Bipolar II disorder (CMS/HCC)  -     lurasidone HCl (Latuda) 60 MG tablet tablet; Take 60 mg orally daily with a meal.  Dispense: 30 tablet; Refill: 3      Crease Latuda to 60 mg p.o. daily with meal.  Telephone appointment start time 12:58 PM and time 1:10 PM    A psychological evaluation was conducted in order to assess past and current level of functioning. Areas assessed included, but were not limited to: perception of social support, perception of ability to face and deal with challenges in life (positive functioning), anxiety symptoms, depressive symptoms, perspective on beliefs/belief system, coping skills for stress, intelligence level,  Therapeutic rapport was established. Interventions conducted today were geared towards incorporating medication management along with support for continued therapy. Education was also provided  as to the med management with this provider and what to expect in subsequent sessions.    We discussed risks, benefits,goals and side effects of the above medication and the patient was agreeable with the plan.Patient was educated on the importance of compliance with treatment and follow-up appointments. Patient is aware to contact the [facility] with any worsening of symptoms. To call for questions or concerns and return early if necessary. Patent is agreeable to go to the Emergency Department or call 911 should they begin SI/HI.     Treatment Plan:   Discussed risks, benefits, and alternatives of medication. Encouraged healthy habits (eating, exercise and sleep). Call if any questions or problems arise. Medication reconciled. Controlled substance monitoring report reviewed. Provided psychoeducation.. Discussed coping strategies and current stressors. Set appropriate boundaries and limits for patient's well-being. Use distraction techniques to improve symptoms. Access support networks.      Return in about 4 weeks (around 12/17/2020) for Follow Up telephone.    Minoo Harrington, FRANCK

## 2020-11-19 NOTE — PROGRESS NOTES
You have chosen to receive care through a telephone visit. Do you consent to use a telephone visit for your medical care today? Yes    Individuals involved:  Vicky - Patient   KEY Larry APRN

## 2020-11-23 DIAGNOSIS — G43.719 INTRACTABLE CHRONIC MIGRAINE WITHOUT AURA AND WITHOUT STATUS MIGRAINOSUS: Primary | ICD-10-CM

## 2020-12-17 ENCOUNTER — OFFICE VISIT (OUTPATIENT)
Dept: BEHAVIORAL HEALTH | Facility: CLINIC | Age: 35
End: 2020-12-17

## 2020-12-17 DIAGNOSIS — F43.10 POST TRAUMATIC STRESS DISORDER (PTSD): Primary | ICD-10-CM

## 2020-12-17 DIAGNOSIS — G43.719 INTRACTABLE CHRONIC MIGRAINE WITHOUT AURA AND WITHOUT STATUS MIGRAINOSUS: ICD-10-CM

## 2020-12-17 DIAGNOSIS — F33.0 MAJOR DEPRESSIVE DISORDER, RECURRENT EPISODE, MILD (HCC): ICD-10-CM

## 2020-12-17 DIAGNOSIS — F41.1 GAD (GENERALIZED ANXIETY DISORDER): ICD-10-CM

## 2020-12-17 DIAGNOSIS — F31.81 BIPOLAR II DISORDER (HCC): ICD-10-CM

## 2020-12-17 PROCEDURE — 99213 OFFICE O/P EST LOW 20 MIN: CPT | Performed by: NURSE PRACTITIONER

## 2020-12-17 RX ORDER — LAMOTRIGINE 100 MG/1
TABLET ORAL
Qty: 90 TABLET | Refills: 2 | Status: SHIPPED | OUTPATIENT
Start: 2020-12-17 | End: 2021-01-13

## 2020-12-17 RX ORDER — HYDROXYZINE PAMOATE 25 MG/1
25 CAPSULE ORAL 3 TIMES DAILY PRN
Qty: 90 CAPSULE | Refills: 3 | Status: SHIPPED | OUTPATIENT
Start: 2020-12-17 | End: 2021-01-13

## 2020-12-17 NOTE — PROGRESS NOTES
"Patient Name: Vicky Lowery  MRN: [id number]   :  1985     Chief Complaint:      ICD-10-CM ICD-9-CM   1. Post traumatic stress disorder (PTSD)  F43.10 309.81   2. Bipolar II disorder (CMS/HCC)  F31.81 296.89   3. Major depressive disorder, recurrent episode, mild (CMS/HCC)  F33.0 296.31       History of Present Illness: Vicky Lowery is a 35 y.o. female is here today for medication management follow up.  Patient states mood has improved.  States currently she is going through some \"personal things\" and wonders if continued mood issues are related to this.    The following portions of the patient's history were reviewed and updated as appropriate: allergies, current medications, past family history, past medical history, past social history, past surgical history and problem list.    Review of Systems;;  Review of Systems   Constitutional: Negative for activity change, appetite change, fatigue, unexpected weight gain and unexpected weight loss.   Respiratory: Negative for shortness of breath and wheezing.    Gastrointestinal: Negative for constipation, diarrhea, nausea and vomiting.   Musculoskeletal: Negative for gait problem.   Skin: Negative for dry skin and rash.   Neurological: Negative for dizziness, speech difficulty, weakness, light-headedness, headache, memory problem and confusion.   Psychiatric/Behavioral: Positive for depressed mood and stress. Negative for agitation, behavioral problems, decreased concentration, dysphoric mood, hallucinations, self-injury, sleep disturbance, suicidal ideas and negative for hyperactivity. The patient is nervous/anxious.        There were no vitals taken for this visit.  There is no height or weight on file to calculate BMI.    Current Medications;;    Current Outpatient Medications:   •  b complex vitamins capsule, Take 1 capsule by mouth Daily., Disp: 30 capsule, Rfl: 11  •  hydroCHLOROthiazide (HYDRODIURIL) 25 MG tablet, Take 25 mg by mouth " Daily., Disp: , Rfl:   •  hydroxychloroquine (PLAQUENIL) 200 MG tablet, Take  by mouth Daily., Disp: , Rfl:   •  hydrOXYzine pamoate (VISTARIL) 25 MG capsule, Take 25 mg by mouth 3 (Three) Times a Day As Needed., Disp: , Rfl:   •  lamoTRIgine (LaMICtal) 100 MG tablet, Take 2 tablets by mouth Every Morning AND 1 tablet Every Evening., Disp: 90 tablet, Rfl: 2  •  lurasidone HCl (Latuda) 60 MG tablet tablet, Take 60 mg orally daily with a meal., Disp: 30 tablet, Rfl: 3  •  propranolol (INDERAL) 60 MG tablet, Take 1 tablet by mouth 2 (Two) Times a Day., Disp: 60 tablet, Rfl: 2  •  vitamin D (ERGOCALCIFEROL) 1.25 MG (74133 UT) capsule capsule, Take 50,000 Units by mouth 1 (One) Time Per Week., Disp: , Rfl:     Lab Results:   Lab on 11/17/2020   Component Date Value Ref Range Status   • Glucose 11/17/2020 95  65 - 99 mg/dL Final   • BUN 11/17/2020 14  6 - 20 mg/dL Final   • Creatinine 11/17/2020 0.85  0.57 - 1.00 mg/dL Final   • Sodium 11/17/2020 140  136 - 145 mmol/L Final   • Potassium 11/17/2020 3.8  3.5 - 5.2 mmol/L Final    Slight hemolysis detected by analyzer. Results may be affected.   • Chloride 11/17/2020 105  98 - 107 mmol/L Final   • CO2 11/17/2020 24.6  22.0 - 29.0 mmol/L Final   • Calcium 11/17/2020 8.6  8.6 - 10.5 mg/dL Final   • Total Protein 11/17/2020 6.6  6.0 - 8.5 g/dL Final   • Albumin 11/17/2020 3.60  3.50 - 5.20 g/dL Final   • ALT (SGPT) 11/17/2020 19  1 - 33 U/L Final   • AST (SGOT) 11/17/2020 21  1 - 32 U/L Final   • Alkaline Phosphatase 11/17/2020 60  39 - 117 U/L Final   • Total Bilirubin 11/17/2020 0.3  0.0 - 1.2 mg/dL Final   • eGFR Non African Amer 11/17/2020 76  >60 mL/min/1.73 Final   • Globulin 11/17/2020 3.0  gm/dL Final   • A/G Ratio 11/17/2020 1.2  g/dL Final   • BUN/Creatinine Ratio 11/17/2020 16.5  7.0 - 25.0 Final   • Anion Gap 11/17/2020 10.4  5.0 - 15.0 mmol/L Final   • C-Reactive Protein 11/17/2020 0.91* 0.00 - 0.50 mg/dL Final   • Sed Rate 11/17/2020 16  0 - 20 mm/hr Final   •  WBC 11/17/2020 6.44  3.40 - 10.80 10*3/mm3 Final   • RBC 11/17/2020 4.18  3.77 - 5.28 10*6/mm3 Final   • Hemoglobin 11/17/2020 13.1  12.0 - 15.9 g/dL Final   • Hematocrit 11/17/2020 38.4  34.0 - 46.6 % Final   • MCV 11/17/2020 91.9  79.0 - 97.0 fL Final   • MCH 11/17/2020 31.3  26.6 - 33.0 pg Final   • MCHC 11/17/2020 34.1  31.5 - 35.7 g/dL Final   • RDW 11/17/2020 13.0  12.3 - 15.4 % Final   • RDW-SD 11/17/2020 43.3  37.0 - 54.0 fl Final   • MPV 11/17/2020 11.8  6.0 - 12.0 fL Final   • Platelets 11/17/2020 188  140 - 450 10*3/mm3 Final   • Neutrophil % 11/17/2020 52.6  42.7 - 76.0 % Final   • Lymphocyte % 11/17/2020 28.1  19.6 - 45.3 % Final   • Monocyte % 11/17/2020 13.2* 5.0 - 12.0 % Final   • Eosinophil % 11/17/2020 4.8  0.3 - 6.2 % Final   • Basophil % 11/17/2020 0.8  0.0 - 1.5 % Final   • Immature Grans % 11/17/2020 0.5  0.0 - 0.5 % Final   • Neutrophils, Absolute 11/17/2020 3.39  1.70 - 7.00 10*3/mm3 Final   • Lymphocytes, Absolute 11/17/2020 1.81  0.70 - 3.10 10*3/mm3 Final   • Monocytes, Absolute 11/17/2020 0.85  0.10 - 0.90 10*3/mm3 Final   • Eosinophils, Absolute 11/17/2020 0.31  0.00 - 0.40 10*3/mm3 Final   • Basophils, Absolute 11/17/2020 0.05  0.00 - 0.20 10*3/mm3 Final   • Immature Grans, Absolute 11/17/2020 0.03  0.00 - 0.05 10*3/mm3 Final   • nRBC 11/17/2020 0.0  0.0 - 0.2 /100 WBC Final   Telephone on 11/16/2020   Component Date Value Ref Range Status   • Progesterone 11/17/2020 10.2  ng/mL Final    Comment:                      Follicular phase       0.1 -   0.9                       Luteal phase           1.8 -  23.9                       Ovulation phase        0.1 -  12.0                       Pregnant                          First trimester    11.0 -  44.3                          Second trimester   25.4 -  83.3                          Third trimester    58.7 - 214.0                       Postmenopausal         0.0 -   0.1     Orders Only on 10/07/2020   Component Date Value Ref Range Status    • HCG Quantitative 10/07/2020 <0.50  mIU/mL Final    Comment: Results may be falsely decreased if patient taking Biotin.  HCG Ranges by Gestational Age  Females - non-pregnant premenopausal   </= 1mIU/mL HCG  Females - postmenopausal               </= 7mIU/mL HCG  3 Weeks         5.4 -      72 mIU/mL  4 Weeks        10.2 -     708 mIU/mL  5 Weeks       217   -   8,245 mIU/mL  6 Weeks       152   -  32,177 mIU/mL  7 Weeks     4,059   - 153,767 mIU/mL  8 Weeks    31,366   - 149,094 mIU/mL  9 Weeks    59,109   - 135,901 mIU/mL  10 Weeks   44,186   - 170,409 mIU/mL  12 Weeks   27,107   - 201,615 mIU/mL  14 Weeks   24,302   -  93,646 mIU/mL  15 Weeks   12,540   -  69,747 mIU/mL  16 Weeks    8,904   -  55,332 mIU/mL  17 Weeks    8,240   -  51,793 mIU/mL  18 Weeks    9,649   -  55,271 mIU/mL     Orders Only on 09/21/2020   Component Date Value Ref Range Status   • Progesterone 09/21/2020 5.09  ng/mL Final       Mental Status Exam:   Hygiene:   tele  Cooperation:  Cooperative  Eye Contact:  tele  Psychomotor Behavior:  tele  Mood:anxious, depressed and irritable  Affect:  Appropriate  Hopelessness: Denies  Speech:  Normal  Thought Process:  Goal directed  Thought Content:  Normal  Suicidal:  None  Homicidal:  None  Hallucinations:  None  Delusion:  None  Memory:  Intact  Orientation:  Person, Place, Time and Situation  Reliability:  good  Insight:  Good  Judgement:  Good  Impulse Control:  Good  Physical/Medical Issues:  No     PHQ-9 Depression Screening  Little interest or pleasure in doing things? 1   Feeling down, depressed, or hopeless? 1   Trouble falling or staying asleep, or sleeping too much? 3   Feeling tired or having little energy? 2   Poor appetite or overeating? 0   Feeling bad about yourself - or that you are a failure or have let yourself or your family down? 1   Trouble concentrating on things, such as reading the newspaper or watching television? 1   Moving or speaking so slowly that other people could  have noticed? Or the opposite - being so fidgety or restless that you have been moving around a lot more than usual? 0   Thoughts that you would be better off dead, or of hurting yourself in some way? 1   PHQ-9 Total Score 10   If you checked off any problems, how difficult have these problems made it for you to do your work, take care of things at home, or get along with other people?          Assessment/Plan:  Diagnoses and all orders for this visit:    1. Post traumatic stress disorder (PTSD) (Primary)  -     Ambulatory Referral to Behavioral Health    2. Bipolar II disorder (CMS/HCC)  -     Ambulatory Referral to Behavioral Health  -     lamoTRIgine (LaMICtal) 100 MG tablet; Take 2 tablets by mouth Every Morning AND 1 tablet Every Evening.  Dispense: 90 tablet; Refill: 2    3. Major depressive disorder, recurrent episode, mild (CMS/HCC)    4. Intractable chronic migraine without aura and without status migrainosus    5. MARGIE (generalized anxiety disorder)  -     hydrOXYzine pamoate (VISTARIL) 25 MG capsule; Take 1 capsule by mouth 3 (Three) Times a Day As Needed for Anxiety.  Dispense: 90 capsule; Refill: 3      Patient states she has not been able to make contact with her therapist since COVID started.  Patient would like a referral to another therapist as she feels this would be very beneficial.  Continue medications as ordered and reevaluate in a month.  Telephone start time 11:22 AM.  End time 11:33 AM.    A psychological evaluation was conducted in order to assess past and current level of functioning. Areas assessed included, but were not limited to: perception of social support, perception of ability to face and deal with challenges in life (positive functioning), anxiety symptoms, depressive symptoms, perspective on beliefs/belief system, coping skills for stress, intelligence level,  Therapeutic rapport was established. Interventions conducted today were geared towards incorporating medication management  along with support for continued therapy. Education was also provided as to the med management with this provider and what to expect in subsequent sessions.    We discussed risks, benefits,goals and side effects of the above medication and the patient was agreeable with the plan.Patient was educated on the importance of compliance with treatment and follow-up appointments. Patient is aware to contact the [facility] with any worsening of symptoms. To call for questions or concerns and return early if necessary. Patent is agreeable to go to the Emergency Department or call 911 should they begin SI/HI.     Treatment Plan:   Discussed risks, benefits, and alternatives of medication. Encouraged healthy habits (eating, exercise and sleep). Call if any questions or problems arise. Medication reconciled. Controlled substance monitoring report reviewed. Provided psychoeducation.. Discussed coping strategies and current stressors. Set appropriate boundaries and limits for patient's well-being. Use distraction techniques to improve symptoms. Access support networks.      No follow-ups on file.    Minoo Harrington, FRANCK

## 2020-12-29 ENCOUNTER — TELEPHONE (OUTPATIENT)
Dept: NEUROLOGY | Facility: CLINIC | Age: 35
End: 2020-12-29

## 2020-12-29 NOTE — TELEPHONE ENCOUNTER
PATIENT HAS BEEN SCHEDULED FOR F/U. SHE WAS WANTING TO SEE IF SHE COULD GET AN RX IN MEANTIME FOR MIGRAINES. SHE SAID RX FOR UBRLEVY WAS DENIED BY HER INSURANCE. SHE SAID THERE WAS MENTION OF SAMPLES SHE COULD  BUT HER VEHICLE IS DOWN AND NO WAY FOR HER TO GET TO OFFICE.     PLEASE ADVISE PATIENT WITH UPDATE.    PH: [phone]

## 2021-01-13 ENCOUNTER — TELEPHONE (OUTPATIENT)
Dept: BEHAVIORAL HEALTH | Facility: CLINIC | Age: 36
End: 2021-01-13

## 2021-01-13 ENCOUNTER — OFFICE VISIT (OUTPATIENT)
Dept: BEHAVIORAL HEALTH | Facility: CLINIC | Age: 36
End: 2021-01-13

## 2021-01-13 DIAGNOSIS — F41.1 GAD (GENERALIZED ANXIETY DISORDER): Primary | ICD-10-CM

## 2021-01-13 DIAGNOSIS — F31.81 BIPOLAR II DISORDER (HCC): ICD-10-CM

## 2021-01-13 PROCEDURE — 99213 OFFICE O/P EST LOW 20 MIN: CPT | Performed by: NURSE PRACTITIONER

## 2021-01-13 RX ORDER — ALBUTEROL SULFATE 90 UG/1
2 AEROSOL, METERED RESPIRATORY (INHALATION) SEE ADMIN INSTRUCTIONS
COMMUNITY
Start: 2020-12-18 | End: 2022-11-22

## 2021-01-13 RX ORDER — LURASIDONE HYDROCHLORIDE 80 MG/1
80 TABLET, FILM COATED ORAL DAILY
Qty: 30 TABLET | Refills: 1 | Status: SHIPPED | OUTPATIENT
Start: 2021-01-13 | End: 2021-02-23 | Stop reason: SDUPTHER

## 2021-01-13 RX ORDER — LAMOTRIGINE 200 MG/1
200 TABLET ORAL 2 TIMES DAILY
Qty: 60 TABLET | Refills: 2 | Status: SHIPPED | OUTPATIENT
Start: 2021-01-13 | End: 2021-02-23 | Stop reason: SDUPTHER

## 2021-01-13 RX ORDER — DIAZEPAM 5 MG/1
5 TABLET ORAL DAILY PRN
Qty: 10 TABLET | Refills: 0 | Status: SHIPPED | OUTPATIENT
Start: 2021-01-13 | End: 2021-02-23 | Stop reason: SDUPTHER

## 2021-01-13 NOTE — PROGRESS NOTES
Patient Name: Vicky Lowery  MRN: [id number]   :  1985     Chief Complaint:      ICD-10-CM ICD-9-CM   1. MARGIE (generalized anxiety disorder)  F41.1 300.02   2. Bipolar II disorder (CMS/[facility])  F31.81 296.89       History of Present Illness: Vicky Lowery is a 35 y.o. female is here today for medication management follow up.  States over the last week her depression has gotten much worse.  She has not had suicidal ideation however has thought about cutting herself but has restrained.  States there continue to be stressors in life that are causing some of these situations.  States she just cannot calm down.    The following portions of the patient's history were reviewed and updated as appropriate: allergies, current medications, past family history, past medical history, past social history, past surgical history and problem list.    Review of Systems;;  Review of Systems   Constitutional: Negative for activity change, appetite change, fatigue, unexpected weight gain and unexpected weight loss.   Respiratory: Negative for shortness of breath and wheezing.    Gastrointestinal: Negative for constipation, diarrhea, nausea and vomiting.   Musculoskeletal: Negative for gait problem.   Skin: Negative for dry skin and rash.   Neurological: Negative for dizziness, speech difficulty, weakness, light-headedness, headache, memory problem and confusion.   Psychiatric/Behavioral: Positive for decreased concentration, self-injury, sleep disturbance, depressed mood and stress. Negative for agitation, behavioral problems, dysphoric mood, hallucinations, suicidal ideas and negative for hyperactivity. The patient is nervous/anxious.        There were no vitals taken for this visit.  There is no height or weight on file to calculate BMI.    Current Medications;;    Current Outpatient Medications:   •  b complex vitamins capsule, Take 1 capsule by mouth Daily., Disp: 30 capsule, Rfl: 11  •  hydroCHLOROthiazide  (HYDRODIURIL) 25 MG tablet, Take 25 mg by mouth Daily., Disp: , Rfl:   •  hydroxychloroquine (PLAQUENIL) 200 MG tablet, Take  by mouth Daily., Disp: , Rfl:   •  propranolol (INDERAL) 60 MG tablet, Take 1 tablet by mouth 2 (Two) Times a Day., Disp: 60 tablet, Rfl: 2  •  vitamin D (ERGOCALCIFEROL) 1.25 MG (42228 UT) capsule capsule, Take 50,000 Units by mouth 1 (One) Time Per Week., Disp: , Rfl:   •  albuterol sulfate  (90 Base) MCG/ACT inhaler, Inhale 2 puffs See Admin Instructions. inhale 2 puffs by mouth every 4 to 6 hours as needed, Disp: , Rfl:   •  diazePAM (Valium) 5 MG tablet, Take 1 tablet by mouth Daily As Needed for Anxiety., Disp: 10 tablet, Rfl: 0  •  lamoTRIgine (LaMICtal) 200 MG tablet, Take 1 tablet by mouth 2 (Two) Times a Day., Disp: 60 tablet, Rfl: 2  •  Lurasidone HCl (Latuda) 80 MG tablet tablet, Take 1 tablet by mouth Daily. Take 80 mg orally daily with a meal., Disp: 30 tablet, Rfl: 1    Lab Results:   Lab on 11/17/2020   Component Date Value Ref Range Status   • Glucose 11/17/2020 95  65 - 99 mg/dL Final   • BUN 11/17/2020 14  6 - 20 mg/dL Final   • Creatinine 11/17/2020 0.85  0.57 - 1.00 mg/dL Final   • Sodium 11/17/2020 140  136 - 145 mmol/L Final   • Potassium 11/17/2020 3.8  3.5 - 5.2 mmol/L Final    Slight hemolysis detected by analyzer. Results may be affected.   • Chloride 11/17/2020 105  98 - 107 mmol/L Final   • CO2 11/17/2020 24.6  22.0 - 29.0 mmol/L Final   • Calcium 11/17/2020 8.6  8.6 - 10.5 mg/dL Final   • Total Protein 11/17/2020 6.6  6.0 - 8.5 g/dL Final   • Albumin 11/17/2020 3.60  3.50 - 5.20 g/dL Final   • ALT (SGPT) 11/17/2020 19  1 - 33 U/L Final   • AST (SGOT) 11/17/2020 21  1 - 32 U/L Final   • Alkaline Phosphatase 11/17/2020 60  39 - 117 U/L Final   • Total Bilirubin 11/17/2020 0.3  0.0 - 1.2 mg/dL Final   • eGFR Non African Amer 11/17/2020 76  >60 mL/min/1.73 Final   • Globulin 11/17/2020 3.0  gm/dL Final   • A/G Ratio 11/17/2020 1.2  g/dL Final   •  BUN/Creatinine Ratio 11/17/2020 16.5  7.0 - 25.0 Final   • Anion Gap 11/17/2020 10.4  5.0 - 15.0 mmol/L Final   • C-Reactive Protein 11/17/2020 0.91* 0.00 - 0.50 mg/dL Final   • Sed Rate 11/17/2020 16  0 - 20 mm/hr Final   • WBC 11/17/2020 6.44  3.40 - 10.80 10*3/mm3 Final   • RBC 11/17/2020 4.18  3.77 - 5.28 10*6/mm3 Final   • Hemoglobin 11/17/2020 13.1  12.0 - 15.9 g/dL Final   • Hematocrit 11/17/2020 38.4  34.0 - 46.6 % Final   • MCV 11/17/2020 91.9  79.0 - 97.0 fL Final   • MCH 11/17/2020 31.3  26.6 - 33.0 pg Final   • MCHC 11/17/2020 34.1  31.5 - 35.7 g/dL Final   • RDW 11/17/2020 13.0  12.3 - 15.4 % Final   • RDW-SD 11/17/2020 43.3  37.0 - 54.0 fl Final   • MPV 11/17/2020 11.8  6.0 - 12.0 fL Final   • Platelets 11/17/2020 188  140 - 450 10*3/mm3 Final   • Neutrophil % 11/17/2020 52.6  42.7 - 76.0 % Final   • Lymphocyte % 11/17/2020 28.1  19.6 - 45.3 % Final   • Monocyte % 11/17/2020 13.2* 5.0 - 12.0 % Final   • Eosinophil % 11/17/2020 4.8  0.3 - 6.2 % Final   • Basophil % 11/17/2020 0.8  0.0 - 1.5 % Final   • Immature Grans % 11/17/2020 0.5  0.0 - 0.5 % Final   • Neutrophils, Absolute 11/17/2020 3.39  1.70 - 7.00 10*3/mm3 Final   • Lymphocytes, Absolute 11/17/2020 1.81  0.70 - 3.10 10*3/mm3 Final   • Monocytes, Absolute 11/17/2020 0.85  0.10 - 0.90 10*3/mm3 Final   • Eosinophils, Absolute 11/17/2020 0.31  0.00 - 0.40 10*3/mm3 Final   • Basophils, Absolute 11/17/2020 0.05  0.00 - 0.20 10*3/mm3 Final   • Immature Grans, Absolute 11/17/2020 0.03  0.00 - 0.05 10*3/mm3 Final   • nRBC 11/17/2020 0.0  0.0 - 0.2 /100 WBC Final   Telephone on 11/16/2020   Component Date Value Ref Range Status   • Progesterone 11/17/2020 10.2  ng/mL Final    Comment:                      Follicular phase       0.1 -   0.9                       Luteal phase           1.8 -  23.9                       Ovulation phase        0.1 -  12.0                       Pregnant                          First trimester    11.0 -  44.3                           Second trimester   25.4 -  83.3                          Third trimester    58.7 - 214.0                       Postmenopausal         0.0 -   0.1         Mental Status Exam:   Hygiene:   tele  Cooperation:  Cooperative  Eye Contact:  tele  Psychomotor Behavior:  tele  Mood:anxious, constricted, decreased range, depressed and sad  Affect:  tearful  Hopelessness: 7  Speech:  Normal  Thought Process:  Flight of ieas  Thought Content:  Normal  Suicidal:  None  Homicidal:  None  Hallucinations:  None  Delusion:  None  Memory:  Intact  Orientation:  Person, Place, Time and Situation  Reliability:  good  Insight:  Good  Judgement:  Good  Impulse Control:  Good  Physical/Medical Issues:  No     PHQ-9 Depression Screening  Little interest or pleasure in doing things?     Feeling down, depressed, or hopeless?     Trouble falling or staying asleep, or sleeping too much?     Feeling tired or having little energy?     Poor appetite or overeating?     Feeling bad about yourself - or that you are a failure or have let yourself or your family down?     Trouble concentrating on things, such as reading the newspaper or watching television?     Moving or speaking so slowly that other people could have noticed? Or the opposite - being so fidgety or restless that you have been moving around a lot more than usual?     Thoughts that you would be better off dead, or of hurting yourself in some way?     PHQ-9 Total Score     If you checked off any problems, how difficult have these problems made it for you to do your work, take care of things at home, or get along with other people?          Assessment/Plan:  Diagnoses and all orders for this visit:    1. MARGIE (generalized anxiety disorder) (Primary)  -     diazePAM (Valium) 5 MG tablet; Take 1 tablet by mouth Daily As Needed for Anxiety.  Dispense: 10 tablet; Refill: 0    2. Bipolar II disorder (CMS/HCC)  -     Lurasidone HCl (Latuda) 80 MG tablet tablet; Take 1 tablet by mouth  Daily. Take 80 mg orally daily with a meal.  Dispense: 30 tablet; Refill: 1  -     lamoTRIgine (LaMICtal) 200 MG tablet; Take 1 tablet by mouth 2 (Two) Times a Day.  Dispense: 60 tablet; Refill: 2      Increase Lamictal to 200 mg twice a day.  Increase Latuda to 800 mg once a day with a meal.  Add Valium 5 mg once a day as needed for anxiety patient instructed not to take it daily more than 5 days and then strictly for emergencies.  Verbalized understanding.  Telephone start time 1:52 PM.  End time 2:05 PM.    A psychological evaluation was conducted in order to assess past and current level of functioning. Areas assessed included, but were not limited to: perception of social support, perception of ability to face and deal with challenges in life (positive functioning), anxiety symptoms, depressive symptoms, perspective on beliefs/belief system, coping skills for stress, intelligence level,  Therapeutic rapport was established. Interventions conducted today were geared towards incorporating medication management along with support for continued therapy. Education was also provided as to the med management with this provider and what to expect in subsequent sessions.    We discussed risks, benefits,goals and side effects of the above medication and the patient was agreeable with the plan.Patient was educated on the importance of compliance with treatment and follow-up appointments. Patient is aware to contact the [facility] with any worsening of symptoms. To call for questions or concerns and return early if necessary. Patent is agreeable to go to the Emergency Department or call 911 should they begin SI/HI.     Treatment Plan:   Discussed risks, benefits, and alternatives of medication. Encouraged healthy habits (eating, exercise and sleep). Call if any questions or problems arise. Medication reconciled. Controlled substance monitoring report reviewed. Provided psychoeducation.. Discussed coping strategies and  current stressors. Set appropriate boundaries and limits for patient's well-being. Use distraction techniques to improve symptoms. Access support networks.      Return in about 1 week (around 1/20/2021) for Follow Up 15 min.    Minoo Harrington, APRN

## 2021-01-20 ENCOUNTER — OFFICE VISIT (OUTPATIENT)
Dept: OBSTETRICS AND GYNECOLOGY | Facility: CLINIC | Age: 36
End: 2021-01-20

## 2021-01-20 VITALS
HEIGHT: 65 IN | WEIGHT: 293 LBS | SYSTOLIC BLOOD PRESSURE: 138 MMHG | BODY MASS INDEX: 48.82 KG/M2 | DIASTOLIC BLOOD PRESSURE: 78 MMHG

## 2021-01-20 DIAGNOSIS — Z31.9 INFERTILITY MANAGEMENT: Primary | ICD-10-CM

## 2021-01-20 PROCEDURE — 99213 OFFICE O/P EST LOW 20 MIN: CPT | Performed by: OBSTETRICS & GYNECOLOGY

## 2021-01-20 NOTE — PROGRESS NOTES
Subjective   Chief Complaint   Patient presents with   • Consult     Patient wants to discuss fertility issues     Vicky Lowery is a 36 y.o. year old .  Patient's last menstrual period was 2020 (exact date).  She presents to be seen because of long standing infertility. HAs had ovulation with and without clomid. Most recently in November without clomid. States SA was doine here and was normal. .     OTHER COMPLAINTS:  Nothing else    The following portions of the patient's history were reviewed and updated as appropriate:  She  has a past medical history of Anxiety, Anxiety, Asthma, Back pain, Bipolar disorder (CMS/HCC), Depression, Kidney disease, chronic, stage II (mild, EGFR 60+ ml/min), Migraine, Mitral valve prolapse, and Tattoo.  She does not have any pertinent problems on file.  She  has a past surgical history that includes Fibula Fracture Surgery (, ); Mandible fracture surgery (); and [place] tooth extraction.  Her family history includes Arthritis in her maternal grandmother and mother; Breast cancer in her mother; Diabetes in her maternal grandfather; Hypertension in her father; Migraines in her father.  She  reports that she has been smoking. She has never used smokeless tobacco. She reports current alcohol use of about 1.0 standard drinks of alcohol per week. She reports that she does not use drugs.  Current Outpatient Medications   Medication Sig Dispense Refill   • albuterol sulfate  (90 Base) MCG/ACT inhaler Inhale 2 puffs See Admin Instructions. inhale 2 puffs by mouth every 4 to 6 hours as needed     • b complex vitamins capsule Take 1 capsule by mouth Daily. 30 capsule 11   • diazePAM (Valium) 5 MG tablet Take 1 tablet by mouth Daily As Needed for Anxiety. 10 tablet 0   • hydroCHLOROthiazide (HYDRODIURIL) 25 MG tablet Take 25 mg by mouth Daily.     • hydroxychloroquine (PLAQUENIL) 200 MG tablet Take  by mouth Daily.     • lamoTRIgine (LaMICtal) 200 MG  tablet Take 1 tablet by mouth 2 (Two) Times a Day. 60 tablet 2   • Lurasidone HCl (Latuda) 80 MG tablet tablet Take 1 tablet by mouth Daily. Take 80 mg orally daily with a meal. 30 tablet 1   • propranolol (INDERAL) 60 MG tablet Take 1 tablet by mouth 2 (Two) Times a Day. 60 tablet 2   • vitamin D (ERGOCALCIFEROL) 1.25 MG (38341 UT) capsule capsule Take 50,000 Units by mouth 1 (One) Time Per Week.       No current facility-administered medications for this visit.      Current Outpatient Medications on File Prior to Visit   Medication Sig   • albuterol sulfate  (90 Base) MCG/ACT inhaler Inhale 2 puffs See Admin Instructions. inhale 2 puffs by mouth every 4 to 6 hours as needed   • b complex vitamins capsule Take 1 capsule by mouth Daily.   • diazePAM (Valium) 5 MG tablet Take 1 tablet by mouth Daily As Needed for Anxiety.   • hydroCHLOROthiazide (HYDRODIURIL) 25 MG tablet Take 25 mg by mouth Daily.   • hydroxychloroquine (PLAQUENIL) 200 MG tablet Take  by mouth Daily.   • lamoTRIgine (LaMICtal) 200 MG tablet Take 1 tablet by mouth 2 (Two) Times a Day.   • Lurasidone HCl (Latuda) 80 MG tablet tablet Take 1 tablet by mouth Daily. Take 80 mg orally daily with a meal.   • propranolol (INDERAL) 60 MG tablet Take 1 tablet by mouth 2 (Two) Times a Day.   • vitamin D (ERGOCALCIFEROL) 1.25 MG (40603 UT) capsule capsule Take 50,000 Units by mouth 1 (One) Time Per Week.     No current facility-administered medications on file prior to visit.      She is allergic to triptans and nsaids.    Social History    Tobacco Use      Smoking status: Current Some Day Smoker      Smokeless tobacco: Never Used    Review of Systems  Consitutional POS: nothing reported    NEG: anorexia or night sweats   Gastointestinal POS: nothing reported    NEG: bloating, change in bowel habits, melena or reflux symptoms   Genitourinary POS: nothing reported    NEG: dysuria or hematuria   Integument POS: nothing reported    NEG: moles that are  "changing in size, shape, color or rashes   Breast POS: nothing reported    NEG: persistent breast lump, skin dimpling or nipple discharge         Respiratory: negative  Cardiovascular: negative          Objective   /78   Ht 165.1 cm (65\")   Wt (!) 196 kg (433 lb)   LMP 12/31/2020 (Exact Date)   Breastfeeding No   BMI 72.05 kg/m²     General:  well developed; well nourished  no acute distress  obese - Body mass index is 72.05 kg/m².   Skin:  Not performed.   Thyroid: not examined   Lungs:  breathing is unlabored  clear to auscultation bilaterally   Heart:  Not performed.   Breasts:  Not performed.   Abdomen: Not performed.   Pelvis: Not performed.     Psychiatric: Alert and oriented ×3, mood and affect appropriate  HEENT: Atraumatic, normocephalic, normal scleral icterus  Extremities: 2+ pulses bilaterally, no edema      Lab Review   CBC, CMP, PRL and TSH    Imaging   Pelvic ultrasound report        Assessment   1. Longstanding infertility with positive ovulation.  Patient has used Clomid in the past and got ovulation but most recently did not use Clomid and still had ovulation-this was in November.  Semen analysis was done and reported normal per patient.  Report has been requested.     Plan   1. Next step is plan for HSG to check the patency of the tubes.  2. PRL today as on LAtuda- new med    No orders of the defined types were placed in this encounter.         This note was electronically signed.      January 20, 2021      "

## 2021-01-21 ENCOUNTER — OFFICE VISIT (OUTPATIENT)
Dept: BEHAVIORAL HEALTH | Facility: CLINIC | Age: 36
End: 2021-01-21

## 2021-01-21 VITALS
OXYGEN SATURATION: 91 % | DIASTOLIC BLOOD PRESSURE: 80 MMHG | BODY MASS INDEX: 48.82 KG/M2 | WEIGHT: 293 LBS | HEART RATE: 103 BPM | SYSTOLIC BLOOD PRESSURE: 136 MMHG | TEMPERATURE: 96.8 F | HEIGHT: 65 IN

## 2021-01-21 DIAGNOSIS — F43.10 POST TRAUMATIC STRESS DISORDER (PTSD): ICD-10-CM

## 2021-01-21 DIAGNOSIS — F41.1 GAD (GENERALIZED ANXIETY DISORDER): ICD-10-CM

## 2021-01-21 DIAGNOSIS — F31.81 BIPOLAR II DISORDER (HCC): ICD-10-CM

## 2021-01-21 DIAGNOSIS — F41.1 GAD (GENERALIZED ANXIETY DISORDER): Primary | ICD-10-CM

## 2021-01-21 PROCEDURE — 99213 OFFICE O/P EST LOW 20 MIN: CPT | Performed by: NURSE PRACTITIONER

## 2021-01-21 RX ORDER — BUPROPION HYDROCHLORIDE 300 MG/1
300 TABLET ORAL DAILY
Qty: 30 TABLET | Refills: 2 | Status: SHIPPED | OUTPATIENT
Start: 2021-01-21 | End: 2021-02-23 | Stop reason: SDUPTHER

## 2021-01-21 NOTE — PROGRESS NOTES
Patient Name: Vicky Lowery  MRN: [id number]   :  1985     Chief Complaint:      ICD-10-CM ICD-9-CM   1. MARGIE (generalized anxiety disorder)  F41.1 300.02   2. Bipolar II disorder (CMS/HCC)  F31.81 296.89   3. Post traumatic stress disorder (PTSD)  F43.10 309.81       History of Present Illness: Vicky Lowery is a 36 y.o. female is here today for medication management follow up.  Patient states sleep is up and down.  Patient states Valium works sometimes and others not.  Patient not sure she is noticed any improvement with Latuda.  States in the past has been suggested she try ECT however she is very scared of this and does not have someone to take her and bring her home.    The following portions of the patient's history were reviewed and updated as appropriate: allergies, current medications, past family history, past medical history, past social history, past surgical history and problem list.    Review of Systems;;  Review of Systems   Constitutional: Negative for activity change, appetite change, fatigue, unexpected weight gain and unexpected weight loss.   Respiratory: Negative for shortness of breath and wheezing.    Gastrointestinal: Negative for constipation, diarrhea, nausea and vomiting.   Musculoskeletal: Negative for gait problem.   Skin: Negative for dry skin and rash.   Neurological: Negative for dizziness, speech difficulty, weakness, light-headedness, headache, memory problem and confusion.   Psychiatric/Behavioral: Positive for dysphoric mood, sleep disturbance, depressed mood and stress. Negative for agitation, behavioral problems, decreased concentration, hallucinations, self-injury, suicidal ideas and negative for hyperactivity. The patient is nervous/anxious.        Physical Exam;;  Physical Exam  Vitals signs and nursing note reviewed.   Constitutional:       General: She is not in acute distress.     Appearance: She is well-developed. She is not diaphoretic.   HENT:  "     Head: Normocephalic and atraumatic.   Eyes:      Conjunctiva/sclera: Conjunctivae normal.   Neck:      Musculoskeletal: Full passive range of motion without pain and normal range of motion.   Cardiovascular:      Rate and Rhythm: Normal rate.   Pulmonary:      Effort: Pulmonary effort is normal. No respiratory distress.   Musculoskeletal: Normal range of motion.   Skin:     General: Skin is warm and dry.   Neurological:      Mental Status: She is alert and oriented to person, place, and time.   Psychiatric:         Mood and Affect: Mood is anxious and depressed. Affect is tearful. Affect is not labile, blunt, angry or inappropriate.         Speech: Speech is not rapid and pressured or tangential.         Behavior: Behavior normal. Behavior is not agitated, slowed, aggressive, withdrawn, hyperactive or combative. Behavior is cooperative.         Thought Content: Thought content normal. Thought content is not paranoid or delusional. Thought content does not include homicidal or suicidal ideation. Thought content does not include homicidal or suicidal plan.         Judgment: Judgment normal.       Blood pressure 136/80, pulse 103, temperature 96.8 °F (36 °C), height 165.1 cm (65\"), weight (!) 195 kg (429 lb), last menstrual period 12/31/2020, SpO2 91 %, not currently breastfeeding.  Body mass index is 71.39 kg/m².    Current Medications;;    Current Outpatient Medications:   •  albuterol sulfate  (90 Base) MCG/ACT inhaler, Inhale 2 puffs See Admin Instructions. inhale 2 puffs by mouth every 4 to 6 hours as needed, Disp: , Rfl:   •  b complex vitamins capsule, Take 1 capsule by mouth Daily., Disp: 30 capsule, Rfl: 11  •  diazePAM (Valium) 5 MG tablet, Take 1 tablet by mouth Daily As Needed for Anxiety., Disp: 10 tablet, Rfl: 0  •  hydroCHLOROthiazide (HYDRODIURIL) 25 MG tablet, Take 25 mg by mouth Daily., Disp: , Rfl:   •  hydroxychloroquine (PLAQUENIL) 200 MG tablet, Take  by mouth Daily., Disp: , Rfl:   •  " lamoTRIgine (LaMICtal) 200 MG tablet, Take 1 tablet by mouth 2 (Two) Times a Day., Disp: 60 tablet, Rfl: 2  •  Lurasidone HCl (Latuda) 80 MG tablet tablet, Take 1 tablet by mouth Daily. Take 80 mg orally daily with a meal., Disp: 30 tablet, Rfl: 1  •  propranolol (INDERAL) 60 MG tablet, Take 1 tablet by mouth 2 (Two) Times a Day., Disp: 60 tablet, Rfl: 2  •  vitamin D (ERGOCALCIFEROL) 1.25 MG (40521 UT) capsule capsule, Take 50,000 Units by mouth 1 (One) Time Per Week., Disp: , Rfl:   •  buPROPion XL (Wellbutrin XL) 300 MG 24 hr tablet, Take 1 tablet by mouth Daily., Disp: 30 tablet, Rfl: 2    Lab Results:   Lab on 11/17/2020   Component Date Value Ref Range Status   • Glucose 11/17/2020 95  65 - 99 mg/dL Final   • BUN 11/17/2020 14  6 - 20 mg/dL Final   • Creatinine 11/17/2020 0.85  0.57 - 1.00 mg/dL Final   • Sodium 11/17/2020 140  136 - 145 mmol/L Final   • Potassium 11/17/2020 3.8  3.5 - 5.2 mmol/L Final    Slight hemolysis detected by analyzer. Results may be affected.   • Chloride 11/17/2020 105  98 - 107 mmol/L Final   • CO2 11/17/2020 24.6  22.0 - 29.0 mmol/L Final   • Calcium 11/17/2020 8.6  8.6 - 10.5 mg/dL Final   • Total Protein 11/17/2020 6.6  6.0 - 8.5 g/dL Final   • Albumin 11/17/2020 3.60  3.50 - 5.20 g/dL Final   • ALT (SGPT) 11/17/2020 19  1 - 33 U/L Final   • AST (SGOT) 11/17/2020 21  1 - 32 U/L Final   • Alkaline Phosphatase 11/17/2020 60  39 - 117 U/L Final   • Total Bilirubin 11/17/2020 0.3  0.0 - 1.2 mg/dL Final   • eGFR Non African Amer 11/17/2020 76  >60 mL/min/1.73 Final   • Globulin 11/17/2020 3.0  gm/dL Final   • A/G Ratio 11/17/2020 1.2  g/dL Final   • BUN/Creatinine Ratio 11/17/2020 16.5  7.0 - 25.0 Final   • Anion Gap 11/17/2020 10.4  5.0 - 15.0 mmol/L Final   • C-Reactive Protein 11/17/2020 0.91* 0.00 - 0.50 mg/dL Final   • Sed Rate 11/17/2020 16  0 - 20 mm/hr Final   • WBC 11/17/2020 6.44  3.40 - 10.80 10*3/mm3 Final   • RBC 11/17/2020 4.18  3.77 - 5.28 10*6/mm3 Final   • Hemoglobin  11/17/2020 13.1  12.0 - 15.9 g/dL Final   • Hematocrit 11/17/2020 38.4  34.0 - 46.6 % Final   • MCV 11/17/2020 91.9  79.0 - 97.0 fL Final   • MCH 11/17/2020 31.3  26.6 - 33.0 pg Final   • MCHC 11/17/2020 34.1  31.5 - 35.7 g/dL Final   • RDW 11/17/2020 13.0  12.3 - 15.4 % Final   • RDW-SD 11/17/2020 43.3  37.0 - 54.0 fl Final   • MPV 11/17/2020 11.8  6.0 - 12.0 fL Final   • Platelets 11/17/2020 188  140 - 450 10*3/mm3 Final   • Neutrophil % 11/17/2020 52.6  42.7 - 76.0 % Final   • Lymphocyte % 11/17/2020 28.1  19.6 - 45.3 % Final   • Monocyte % 11/17/2020 13.2* 5.0 - 12.0 % Final   • Eosinophil % 11/17/2020 4.8  0.3 - 6.2 % Final   • Basophil % 11/17/2020 0.8  0.0 - 1.5 % Final   • Immature Grans % 11/17/2020 0.5  0.0 - 0.5 % Final   • Neutrophils, Absolute 11/17/2020 3.39  1.70 - 7.00 10*3/mm3 Final   • Lymphocytes, Absolute 11/17/2020 1.81  0.70 - 3.10 10*3/mm3 Final   • Monocytes, Absolute 11/17/2020 0.85  0.10 - 0.90 10*3/mm3 Final   • Eosinophils, Absolute 11/17/2020 0.31  0.00 - 0.40 10*3/mm3 Final   • Basophils, Absolute 11/17/2020 0.05  0.00 - 0.20 10*3/mm3 Final   • Immature Grans, Absolute 11/17/2020 0.03  0.00 - 0.05 10*3/mm3 Final   • nRBC 11/17/2020 0.0  0.0 - 0.2 /100 WBC Final   Telephone on 11/16/2020   Component Date Value Ref Range Status   • Progesterone 11/17/2020 10.2  ng/mL Final    Comment:                      Follicular phase       0.1 -   0.9                       Luteal phase           1.8 -  23.9                       Ovulation phase        0.1 -  12.0                       Pregnant                          First trimester    11.0 -  44.3                          Second trimester   25.4 -  83.3                          Third trimester    58.7 - 214.0                       Postmenopausal         0.0 -   0.1         Mental Status Exam:   Hygiene:   good  Cooperation:  Cooperative  Eye Contact:  Good  Psychomotor Behavior:  Appropriate  Mood:anxious, depressed, dysthymic and sad  Affect:   Restricted  Hopelessness: 5  Speech:  Monotone  Thought Process:  Goal directed  Thought Content:  Normal  Suicidal:  None  Homicidal:  None  Hallucinations:  None  Delusion:  None  Memory:  Intact  Orientation:  Person, Place, Time and Situation  Reliability:  good  Insight:  Good  Judgement:  Good  Impulse Control:  Good  Physical/Medical Issues:  No     PHQ-9 Depression Screening  Little interest or pleasure in doing things? 2   Feeling down, depressed, or hopeless? 2   Trouble falling or staying asleep, or sleeping too much? 1   Feeling tired or having little energy? 3   Poor appetite or overeating? 0   Feeling bad about yourself - or that you are a failure or have let yourself or your family down? 1   Trouble concentrating on things, such as reading the newspaper or watching television? 0   Moving or speaking so slowly that other people could have noticed? Or the opposite - being so fidgety or restless that you have been moving around a lot more than usual? 1   Thoughts that you would be better off dead, or of hurting yourself in some way? 1(ideation without intent)   PHQ-9 Total Score 11   If you checked off any problems, how difficult have these problems made it for you to do your work, take care of things at home, or get along with other people?          Assessment/Plan:  Diagnoses and all orders for this visit:    1. MARGEI (generalized anxiety disorder) (Primary)  -     Urine Drug Screen - Urine, Clean Catch; Future  -     buPROPion XL (Wellbutrin XL) 300 MG 24 hr tablet; Take 1 tablet by mouth Daily.  Dispense: 30 tablet; Refill: 2    2. Bipolar II disorder (CMS/HCC)    3. Post traumatic stress disorder (PTSD)      Add Wellbutrin  p.o. daily to help with severe depression.  Patient has tolerated this in the past.  Has not really noticed much improvement with the Latuda we may trial another medication next month.  Urine drug screen and controlled substance agreement obtained.    A psychological  evaluation was conducted in order to assess past and current level of functioning. Areas assessed included, but were not limited to: perception of social support, perception of ability to face and deal with challenges in life (positive functioning), anxiety symptoms, depressive symptoms, perspective on beliefs/belief system, coping skills for stress, intelligence level,  Therapeutic rapport was established. Interventions conducted today were geared towards incorporating medication management along with support for continued therapy. Education was also provided as to the med management with this provider and what to expect in subsequent sessions.    We discussed risks, benefits,goals and side effects of the above medication and the patient was agreeable with the plan.Patient was educated on the importance of compliance with treatment and follow-up appointments. Patient is aware to contact the [facility] with any worsening of symptoms. To call for questions or concerns and return early if necessary. Patent is agreeable to go to the Emergency Department or call 911 should they begin SI/HI.     Treatment Plan:   Discussed risks, benefits, and alternatives of medication. Encouraged healthy habits (eating, exercise and sleep). Call if any questions or problems arise. Medication reconciled. Controlled substance monitoring report reviewed. Provided psychoeducation.. Discussed coping strategies and current stressors. Set appropriate boundaries and limits for patient's well-being. Use distraction techniques to improve symptoms. Access support networks.      Return in about 4 weeks (around 2/18/2021) for Follow Up 15 min.    FRANCK Segovia

## 2021-01-22 LAB
AMPHETAMINES UR QL SCN: NEGATIVE NG/ML
BARBITURATES UR QL SCN: NEGATIVE NG/ML
BENZODIAZ UR QL SCN: POSITIVE NG/ML
BZE UR QL SCN: NEGATIVE NG/ML
CANNABINOIDS UR QL SCN: POSITIVE NG/ML
CREAT UR-MCNC: 131.7 MG/DL (ref 20–300)
LABORATORY COMMENT REPORT: ABNORMAL
METHADONE UR QL SCN: NEGATIVE NG/ML
OPIATES UR QL SCN: NEGATIVE NG/ML
OXYCODONE+OXYMORPHONE UR QL SCN: NEGATIVE NG/ML
PCP UR QL: NEGATIVE NG/ML
PH UR: 8.4 [PH] (ref 4.5–8.9)
PROPOXYPH UR QL SCN: NEGATIVE NG/ML

## 2021-02-02 ENCOUNTER — HOSPITAL ENCOUNTER (OUTPATIENT)
Dept: GENERAL RADIOLOGY | Facility: HOSPITAL | Age: 36
Discharge: HOME | End: 2021-02-02
Admitting: OBSTETRICS & GYNECOLOGY

## 2021-02-02 DIAGNOSIS — Z31.9 INFERTILITY MANAGEMENT: ICD-10-CM

## 2021-02-02 PROCEDURE — 74740 X-RAY FEMALE GENITAL TRACT: CPT

## 2021-02-05 ENCOUNTER — APPOINTMENT (OUTPATIENT)
Dept: GENERAL RADIOLOGY | Facility: HOSPITAL | Age: 36
End: 2021-02-05

## 2021-02-05 DIAGNOSIS — G43.719 INTRACTABLE CHRONIC MIGRAINE WITHOUT AURA AND WITHOUT STATUS MIGRAINOSUS: ICD-10-CM

## 2021-02-05 RX ORDER — PROPRANOLOL HYDROCHLORIDE 60 MG/1
TABLET ORAL
Qty: 60 TABLET | Refills: 2 | Status: SHIPPED | OUTPATIENT
Start: 2021-02-05 | End: 2022-03-25 | Stop reason: ALTCHOICE

## 2021-02-16 ENCOUNTER — TELEMEDICINE (OUTPATIENT)
Dept: BEHAVIORAL HEALTH | Facility: CLINIC | Age: 36
End: 2021-02-16

## 2021-02-16 DIAGNOSIS — F33.0 MAJOR DEPRESSIVE DISORDER, RECURRENT, MILD (HCC): Primary | ICD-10-CM

## 2021-02-16 DIAGNOSIS — F43.10 PTSD (POST-TRAUMATIC STRESS DISORDER): ICD-10-CM

## 2021-02-16 PROCEDURE — 90791 PSYCH DIAGNOSTIC EVALUATION: CPT | Performed by: COUNSELOR

## 2021-02-23 ENCOUNTER — TELEMEDICINE (OUTPATIENT)
Dept: BEHAVIORAL HEALTH | Facility: CLINIC | Age: 36
End: 2021-02-23

## 2021-02-23 DIAGNOSIS — F41.1 GAD (GENERALIZED ANXIETY DISORDER): Primary | ICD-10-CM

## 2021-02-23 DIAGNOSIS — F31.81 BIPOLAR II DISORDER (HCC): ICD-10-CM

## 2021-02-23 DIAGNOSIS — F43.10 POST TRAUMATIC STRESS DISORDER (PTSD): ICD-10-CM

## 2021-02-23 PROCEDURE — 99214 OFFICE O/P EST MOD 30 MIN: CPT | Performed by: NURSE PRACTITIONER

## 2021-02-23 RX ORDER — LAMOTRIGINE 200 MG/1
200 TABLET ORAL 2 TIMES DAILY
Qty: 60 TABLET | Refills: 2 | Status: SHIPPED | OUTPATIENT
Start: 2021-02-23 | End: 2021-06-15 | Stop reason: SDUPTHER

## 2021-02-23 RX ORDER — BUPROPION HYDROCHLORIDE 300 MG/1
300 TABLET ORAL DAILY
Qty: 30 TABLET | Refills: 2 | OUTPATIENT
Start: 2021-02-23 | End: 2021-06-09

## 2021-02-23 RX ORDER — BUPROPION HYDROCHLORIDE 150 MG/1
TABLET ORAL
Qty: 30 TABLET | Refills: 2 | Status: SHIPPED | OUTPATIENT
Start: 2021-02-23 | End: 2021-06-15 | Stop reason: SDUPTHER

## 2021-02-23 RX ORDER — DIAZEPAM 5 MG/1
5 TABLET ORAL DAILY PRN
Qty: 10 TABLET | Refills: 0 | Status: SHIPPED | OUTPATIENT
Start: 2021-02-23 | End: 2021-06-15 | Stop reason: SDUPTHER

## 2021-02-23 RX ORDER — LURASIDONE HYDROCHLORIDE 80 MG/1
80 TABLET, FILM COATED ORAL DAILY
Qty: 30 TABLET | Refills: 1 | Status: SHIPPED | OUTPATIENT
Start: 2021-02-23 | End: 2021-03-30 | Stop reason: SDUPTHER
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
## 2021-02-23 NOTE — PROGRESS NOTES
Patient Name: Vicky Lowery  MRN: [id number]   :  1985     Chief Complaint:      ICD-10-CM ICD-9-CM   1. MARGIE (generalized anxiety disorder)  F41.1 300.02   2. Bipolar II disorder (CMS/HCC)  F31.81 296.89   3. Post traumatic stress disorder (PTSD)  F43.10 309.81       History of Present Illness: Vicky Lowery is a 36 y.o. female is here today for medication management follow up.  Does feel like depression some better.  States last time she was on Wellbutrin she was on 450 mg.  Patient states her lows are not as low.  Patient states sometimes she notices she is sleeping longer.    The following portions of the patient's history were reviewed and updated as appropriate: allergies, current medications, past family history, past medical history, past social history, past surgical history and problem list.    Review of Systems;;  Review of Systems   Constitutional: Negative for activity change, appetite change, fatigue, unexpected weight gain and unexpected weight loss.   Respiratory: Negative for shortness of breath and wheezing.    Gastrointestinal: Negative for constipation, diarrhea, nausea and vomiting.   Musculoskeletal: Negative for gait problem.   Skin: Negative for dry skin and rash.   Neurological: Negative for dizziness, speech difficulty, weakness, light-headedness, headache, memory problem and confusion.   Psychiatric/Behavioral: Positive for dysphoric mood, sleep disturbance, depressed mood and stress. Negative for agitation, behavioral problems, decreased concentration, hallucinations, self-injury, suicidal ideas and negative for hyperactivity. The patient is nervous/anxious.        Physical Exam;;  Physical Exam  Vitals signs and nursing note reviewed.   Constitutional:       General: She is not in acute distress.     Appearance: She is well-developed. She is not diaphoretic.   HENT:      Head: Normocephalic and atraumatic.   Eyes:      Conjunctiva/sclera: Conjunctivae normal.    Neck:      Musculoskeletal: Full passive range of motion without pain and normal range of motion.   Cardiovascular:      Rate and Rhythm: Normal rate.   Pulmonary:      Effort: Pulmonary effort is normal. No respiratory distress.   Musculoskeletal: Normal range of motion.   Skin:     General: Skin is warm and dry.   Neurological:      Mental Status: She is alert and oriented to person, place, and time.   Psychiatric:         Mood and Affect: Mood is anxious and depressed. Affect is not labile, blunt, angry or inappropriate.         Speech: Speech is not rapid and pressured or tangential.         Behavior: Behavior normal. Behavior is not agitated, slowed, aggressive, withdrawn, hyperactive or combative. Behavior is cooperative.         Thought Content: Thought content normal. Thought content is not paranoid or delusional. Thought content does not include homicidal or suicidal ideation. Thought content does not include homicidal or suicidal plan.         Judgment: Judgment normal.       not currently breastfeeding.  There is no height or weight on file to calculate BMI.    Current Medications;;    Current Outpatient Medications:   •  albuterol sulfate  (90 Base) MCG/ACT inhaler, Inhale 2 puffs See Admin Instructions. inhale 2 puffs by mouth every 4 to 6 hours as needed, Disp: , Rfl:   •  b complex vitamins capsule, Take 1 capsule by mouth Daily., Disp: 30 capsule, Rfl: 11  •  buPROPion XL (Wellbutrin XL) 300 MG 24 hr tablet, Take 1 tablet by mouth Daily., Disp: 30 tablet, Rfl: 2  •  diazePAM (Valium) 5 MG tablet, Take 1 tablet by mouth Daily As Needed for Anxiety., Disp: 10 tablet, Rfl: 0  •  hydroCHLOROthiazide (HYDRODIURIL) 25 MG tablet, Take 25 mg by mouth Daily., Disp: , Rfl:   •  hydroxychloroquine (PLAQUENIL) 200 MG tablet, Take  by mouth Daily., Disp: , Rfl:   •  lamoTRIgine (LaMICtal) 200 MG tablet, Take 1 tablet by mouth 2 (Two) Times a Day., Disp: 60 tablet, Rfl: 2  •  Lurasidone HCl (Latuda) 80 MG  tablet tablet, Take 1 tablet by mouth Daily. Take 80 mg orally daily with a meal., Disp: 30 tablet, Rfl: 1  •  propranolol (INDERAL) 60 MG tablet, TAKE ONE TABLET BY MOUTH TWICE DAILY, Disp: 60 tablet, Rfl: 2  •  vitamin D (ERGOCALCIFEROL) 1.25 MG (34481 UT) capsule capsule, Take 50,000 Units by mouth 1 (One) Time Per Week., Disp: , Rfl:   •  buPROPion XL (Wellbutrin XL) 150 MG 24 hr tablet, One po daily With 300mg, Disp: 30 tablet, Rfl: 2    Lab Results:   Orders Only on 01/21/2021   Component Date Value Ref Range Status   • Amphetamine, Urine Qual 01/21/2021 Negative  Qryolv=9699 ng/mL Final   • Barbiturates Screen, Urine 01/21/2021 Negative  Depeuz=021 ng/mL Final   • Benzodiazepine Screen, Urine 01/21/2021 Positive* Yziapc=509 ng/mL Final   • THC Screen, Urine 01/21/2021 Positive* Cutoff=20 ng/mL Final   • Cocaine Screen, Urine 01/21/2021 Negative  Oowhjj=723 ng/mL Final   • Opiate Screen, Urine 01/21/2021 Negative  Xwcfrg=587 ng/mL Final    Opiate test includes Codeine, Morphine, Hydromorphone, Hydrocodone.   • Oxycodone/Oxymorphone, Urine 01/21/2021 Negative  Epvpaf=838 ng/mL Final    Test includes Oxycodone and Oxymorphone   • Phencyclidine (PCP), Urine 01/21/2021 Negative  Cutoff=25 ng/mL Final   • Methadone Screen, Urine 01/21/2021 Negative  Bdddjc=927 ng/mL Final   • Propoxyphene Screen 01/21/2021 Negative  Zpygqd=279 ng/mL Final   • Creatinine, Urine 01/21/2021 131.7  20.0 - 300.0 mg/dL Final   • pH, UA 01/21/2021 8.4  4.5 - 8.9 Final   • Please note 01/21/2021 Comment   Final    Comment: This assay provides a preliminary unconfirmed analytical test  result that may be suitable for clinical management of patients  in certain situations. Drug-test results should be interpreted  in the context of clinical information. Patient metabolic  variables, specific drug chemistry, and specimen  characteristics can affect test outcome. Technical consultation  is available if a test result is inconsistent with an  expected  outcome. ([email or web address] or call toll-free  [phone])         Mental Status Exam:   Hygiene:   good  Cooperation:  Cooperative  Eye Contact:  Good  Psychomotor Behavior:  Appropriate  Mood:anxious, constricted, decreased range, dysthymic, irritable and sad  Affect:  Appropriate  Hopelessness: Denies  Speech:  Normal  Thought Process:  Goal directed  Thought Content:  Normal  Suicidal:  None  Homicidal:  None  Hallucinations:  None  Delusion:  None  Memory:  Intact  Orientation:  Person, Place, Time and Situation  Reliability:  good  Insight:  Good  Judgement:  Good  Impulse Control:  Good  Physical/Medical Issues:  No     PHQ-9 Depression Screening  Little interest or pleasure in doing things? 1   Feeling down, depressed, or hopeless? 1   Trouble falling or staying asleep, or sleeping too much? 1   Feeling tired or having little energy? 3   Poor appetite or overeating? 0   Feeling bad about yourself - or that you are a failure or have let yourself or your family down? 1   Trouble concentrating on things, such as reading the newspaper or watching television? 1   Moving or speaking so slowly that other people could have noticed? Or the opposite - being so fidgety or restless that you have been moving around a lot more than usual? 1   Thoughts that you would be better off dead, or of hurting yourself in some way? 0   PHQ-9 Total Score 9   If you checked off any problems, how difficult have these problems made it for you to do your work, take care of things at home, or get along with other people?          Assessment/Plan:  Diagnoses and all orders for this visit:    1. MARGIE (generalized anxiety disorder) (Primary)  -     diazePAM (Valium) 5 MG tablet; Take 1 tablet by mouth Daily As Needed for Anxiety.  Dispense: 10 tablet; Refill: 0  -     buPROPion XL (Wellbutrin XL) 300 MG 24 hr tablet; Take 1 tablet by mouth Daily.  Dispense: 30 tablet; Refill: 2  -     buPROPion XL (Wellbutrin XL)  150 MG 24 hr tablet; One po daily With 300mg  Dispense: 30 tablet; Refill: 2    2. Bipolar II disorder (CMS/HCC)  -     lamoTRIgine (LaMICtal) 200 MG tablet; Take 1 tablet by mouth 2 (Two) Times a Day.  Dispense: 60 tablet; Refill: 2  -     Lurasidone HCl (Latuda) 80 MG tablet tablet; Take 1 tablet by mouth Daily. Take 80 mg orally daily with a meal.  Dispense: 30 tablet; Refill: 1    3. Post traumatic stress disorder (PTSD)      Increase wellbutrin to 300mg and 150mg together. My Chart video appt start time 2:27pm end time 2:38pm    A psychological evaluation was conducted in order to assess past and current level of functioning. Areas assessed included, but were not limited to: perception of social support, perception of ability to face and deal with challenges in life (positive functioning), anxiety symptoms, depressive symptoms, perspective on beliefs/belief system, coping skills for stress, intelligence level,  Therapeutic rapport was established. Interventions conducted today were geared towards incorporating medication management along with support for continued therapy. Education was also provided as to the med management with this provider and what to expect in subsequent sessions.    We discussed risks, benefits,goals and side effects of the above medication and the patient was agreeable with the plan.Patient was educated on the importance of compliance with treatment and follow-up appointments. Patient is aware to contact the [facility] with any worsening of symptoms. To call for questions or concerns and return early if necessary. Patent is agreeable to go to the Emergency Department or call 911 should they begin SI/HI.     Treatment Plan:   Discussed risks, benefits, and alternatives of medication. Encouraged healthy habits (eating, exercise and sleep). Call if any questions or problems arise. Medication reconciled. Controlled substance monitoring report reviewed. Provided psychoeducation..  Discussed coping strategies and current stressors. Set appropriate boundaries and limits for patient's well-being. Use distraction techniques to improve symptoms. Access support networks.      Return in about 4 weeks (around 3/23/2021) for Follow Up 15 min or , Video visit.    Minoo Harrington, APRN

## 2021-02-24 ENCOUNTER — TELEMEDICINE (OUTPATIENT)
Dept: NEUROLOGY | Facility: CLINIC | Age: 36
End: 2021-02-24

## 2021-02-24 DIAGNOSIS — G43.719 INTRACTABLE CHRONIC MIGRAINE WITHOUT AURA AND WITHOUT STATUS MIGRAINOSUS: Primary | ICD-10-CM

## 2021-02-24 PROCEDURE — 99212 OFFICE O/P EST SF 10 MIN: CPT | Performed by: NURSE PRACTITIONER

## 2021-02-24 NOTE — PROGRESS NOTES
Follow Up Neurology Office Visit      Patient Name: Vicky Lowery    Referring Physician: No ref. provider found    Chief Complaint: Headaches      History of Present Illness: Vicky Lowery is a 36 y.o. female who is here to follow up with Neurology for headaches.  She is doing very well on current therapy.  She reports that her lamotrigine was increased by behavioral health, and she has continued to take propranolol.  She is no longer having daily headache, estimates only a few migraine headache days per month.  She did find Ubrelvy helpful for migraine episodes. Actively planning pregnancy and undergoing fertility evaluation.    The following portions of the patient's history were reviewed and updated as appropriate: allergies, current medications, past family history, past medical history, past social history, past surgical history and problem list.    Subjective     Review of Systems:   Review of Systems   Neurological: Positive for headache.   All other systems reviewed and are negative.    Medications:     Current Outpatient Medications:   •  albuterol sulfate  (90 Base) MCG/ACT inhaler, Inhale 2 puffs See Admin Instructions. inhale 2 puffs by mouth every 4 to 6 hours as needed, Disp: , Rfl:   •  b complex vitamins capsule, Take 1 capsule by mouth Daily., Disp: 30 capsule, Rfl: 11  •  buPROPion XL (Wellbutrin XL) 150 MG 24 hr tablet, One po daily With 300mg, Disp: 30 tablet, Rfl: 2  •  buPROPion XL (Wellbutrin XL) 300 MG 24 hr tablet, Take 1 tablet by mouth Daily., Disp: 30 tablet, Rfl: 2  •  diazePAM (Valium) 5 MG tablet, Take 1 tablet by mouth Daily As Needed for Anxiety., Disp: 10 tablet, Rfl: 0  •  hydroCHLOROthiazide (HYDRODIURIL) 25 MG tablet, Take 25 mg by mouth Daily., Disp: , Rfl:   •  hydroxychloroquine (PLAQUENIL) 200 MG tablet, Take  by mouth Daily., Disp: , Rfl:   •  lamoTRIgine (LaMICtal) 200 MG tablet, Take 1 tablet by mouth 2 (Two) Times a Day., Disp: 60 tablet,  Rfl: 2  •  Lurasidone HCl (Latuda) 80 MG tablet tablet, Take 1 tablet by mouth Daily. Take 80 mg orally daily with a meal., Disp: 30 tablet, Rfl: 1  •  propranolol (INDERAL) 60 MG tablet, TAKE ONE TABLET BY MOUTH TWICE DAILY, Disp: 60 tablet, Rfl: 2  •  vitamin D (ERGOCALCIFEROL) 1.25 MG (73249 UT) capsule capsule, Take 50,000 Units by mouth 1 (One) Time Per Week., Disp: , Rfl:     Allergies:   Allergies   Allergen Reactions   • Triptans Swelling     Felt like throat was closing up   • Nsaids Other (See Comments)     Developed renal insufficiency after long term use       Objective     Physical Exam:  Vital Signs: There were no vitals filed for this visit.    Physical Exam  Neurological:      Mental Status: She is oriented to person, place, and time.   Psychiatric:         Speech: Speech normal.       Neurologic Exam     Mental Status   Oriented to person, place, and time.   Attention: normal. Concentration: normal.   Speech: speech is normal   Level of consciousness: alert  Normal comprehension.     Cranial Nerves     CN VII   Facial expression full, symmetric.     CN VIII   Hearing: intact    Results Review:   I have reviewed the patient's other medical records to include, labs, radiology and referrals.   I reviewed the patient's new imaging results and agree with the interpretation.    Assessment / Plan      Assessment/Plan:   Diagnoses and all orders for this visit:    1. Intractable chronic migraine without aura and without status migrainosus (Primary)    Patient headache frequency has decreased significantly with current therapy.  Mood and affect seems considerably improved today compared to previous visits.  She will continue current medications, and will  samples of Ubrelvy on Friday as she has another appointment at that time.  We discussed that when she becomes pregnant, Ubrelvy is contraindicated but she may use triptans.  She has had difficulty tolerating Imitrex in the past due to sensation of  throat swelling, may consider alternative triptan with OB/GYN approval.    Follow Up:   Return in about 6 months (around 8/24/2021).     FRANCK Peña  [facility]     Please note that portions of this note may have been completed with a voice recognition program. Efforts were made to edit the dictations, but occasionally words are mistranscribed.  This was an audio and video enabled telemedicine encounter lasting 7 minutes.

## 2021-02-26 ENCOUNTER — OFFICE VISIT (OUTPATIENT)
Dept: OBSTETRICS AND GYNECOLOGY | Facility: CLINIC | Age: 36
End: 2021-02-26

## 2021-02-26 ENCOUNTER — TELEPHONE (OUTPATIENT)
Dept: NEUROLOGY | Facility: CLINIC | Age: 36
End: 2021-02-26

## 2021-02-26 VITALS
SYSTOLIC BLOOD PRESSURE: 140 MMHG | DIASTOLIC BLOOD PRESSURE: 78 MMHG | BODY MASS INDEX: 48.82 KG/M2 | HEIGHT: 65 IN | WEIGHT: 293 LBS

## 2021-02-26 DIAGNOSIS — N88.2 CERVICAL STENOSIS (UTERINE CERVIX): Primary | ICD-10-CM

## 2021-02-26 PROCEDURE — 99213 OFFICE O/P EST LOW 20 MIN: CPT | Performed by: OBSTETRICS & GYNECOLOGY

## 2021-02-26 RX ORDER — SODIUM CHLORIDE 0.9 % (FLUSH) 0.9 %
10 SYRINGE (ML) INJECTION AS NEEDED
Status: CANCELLED | OUTPATIENT
Start: 2021-02-26

## 2021-02-26 RX ORDER — SODIUM CHLORIDE 0.9 % (FLUSH) 0.9 %
3 SYRINGE (ML) INJECTION EVERY 12 HOURS SCHEDULED
Status: CANCELLED | OUTPATIENT
Start: 2021-02-26

## 2021-02-26 NOTE — PROGRESS NOTES
Subjective   Chief Complaint   Patient presents with   • Follow-up     discuss HSG results      Vicky Lowery is a 36 y.o. year old .  Patient's last menstrual period was 2021.  She presents to be seen because of potential cervical stenosis.  Was unable to pass the cannula during HSG.  With a positive ovulation.  Normal semen analysis..     OTHER COMPLAINTS:  Nothing else    The following portions of the patient's history were reviewed and updated as appropriate:  She  has a past medical history of Anxiety, Anxiety, Asthma, Back pain, Bipolar disorder (CMS/[facility]), Depression, Kidney disease, chronic, stage II (mild, EGFR 60+ ml/min), Migraine, Mitral valve prolapse, and Tattoo.  She does not have any pertinent problems on file.  She  has a past surgical history that includes Fibula Fracture Surgery (, ); Mandible fracture surgery (); and [place] tooth extraction.  Her family history includes Arthritis in her maternal grandmother and mother; Breast cancer in her mother; Diabetes in her maternal grandfather; Hypertension in her father; Migraines in her father.  She  reports that she has been smoking. She has never used smokeless tobacco. She reports current alcohol use of about 1.0 standard drinks of alcohol per week. She reports that she does not use drugs.  Current Outpatient Medications   Medication Sig Dispense Refill   • albuterol sulfate  (90 Base) MCG/ACT inhaler Inhale 2 puffs See Admin Instructions. inhale 2 puffs by mouth every 4 to 6 hours as needed     • b complex vitamins capsule Take 1 capsule by mouth Daily. 30 capsule 11   • buPROPion XL (Wellbutrin XL) 150 MG 24 hr tablet One po daily With 300mg 30 tablet 2   • buPROPion XL (Wellbutrin XL) 300 MG 24 hr tablet Take 1 tablet by mouth Daily. 30 tablet 2   • diazePAM (Valium) 5 MG tablet Take 1 tablet by mouth Daily As Needed for Anxiety. 10 tablet 0   • hydroCHLOROthiazide (HYDRODIURIL) 25 MG tablet Take 25 mg by  mouth Daily.     • hydroxychloroquine (PLAQUENIL) 200 MG tablet Take  by mouth Daily.     • lamoTRIgine (LaMICtal) 200 MG tablet Take 1 tablet by mouth 2 (Two) Times a Day. 60 tablet 2   • Lurasidone HCl (Latuda) 80 MG tablet tablet Take 1 tablet by mouth Daily. Take 80 mg orally daily with a meal. 30 tablet 1   • propranolol (INDERAL) 60 MG tablet TAKE ONE TABLET BY MOUTH TWICE DAILY 60 tablet 2   • vitamin D (ERGOCALCIFEROL) 1.25 MG (02637 UT) capsule capsule Take 50,000 Units by mouth 1 (One) Time Per Week.       No current facility-administered medications for this visit.      Current Outpatient Medications on File Prior to Visit   Medication Sig   • albuterol sulfate  (90 Base) MCG/ACT inhaler Inhale 2 puffs See Admin Instructions. inhale 2 puffs by mouth every 4 to 6 hours as needed   • b complex vitamins capsule Take 1 capsule by mouth Daily.   • buPROPion XL (Wellbutrin XL) 150 MG 24 hr tablet One po daily With 300mg   • buPROPion XL (Wellbutrin XL) 300 MG 24 hr tablet Take 1 tablet by mouth Daily.   • diazePAM (Valium) 5 MG tablet Take 1 tablet by mouth Daily As Needed for Anxiety.   • hydroCHLOROthiazide (HYDRODIURIL) 25 MG tablet Take 25 mg by mouth Daily.   • hydroxychloroquine (PLAQUENIL) 200 MG tablet Take  by mouth Daily.   • lamoTRIgine (LaMICtal) 200 MG tablet Take 1 tablet by mouth 2 (Two) Times a Day.   • Lurasidone HCl (Latuda) 80 MG tablet tablet Take 1 tablet by mouth Daily. Take 80 mg orally daily with a meal.   • propranolol (INDERAL) 60 MG tablet TAKE ONE TABLET BY MOUTH TWICE DAILY   • vitamin D (ERGOCALCIFEROL) 1.25 MG (76827 UT) capsule capsule Take 50,000 Units by mouth 1 (One) Time Per Week.     No current facility-administered medications on file prior to visit.      She is allergic to triptans and nsaids.    Social History    Tobacco Use      Smoking status: Current Some Day Smoker      Smokeless tobacco: Never Used    Review of Systems  Consitutional POS: nothing reported     "NEG: anorexia or night sweats   Gastointestinal POS: nothing reported    NEG: bloating, change in bowel habits, melena or reflux symptoms   Genitourinary POS: nothing reported    NEG: dysuria or hematuria   Integument POS: nothing reported    NEG: moles that are changing in size, shape, color or rashes   Breast POS: nothing reported    NEG: persistent breast lump, skin dimpling or nipple discharge         Pertinent items are noted in HPI.          Objective   /78   Ht 165.1 cm (65\")   Wt (!) 201 kg (443 lb)   LMP 02/25/2021   BMI 73.72 kg/m²     General:  well developed; well nourished  no acute distress  obese - Body mass index is 73.72 kg/m².   Skin:  Not performed.   Thyroid: not examined   Lungs:  breathing is unlabored  clear to auscultation bilaterally   Heart:  regular rate and rhythm, S1, S2 normal, no murmur, click, rub or gallop   Breasts:  Not performed.   Abdomen: soft, non-tender; no masses  no umbilical or inguinal hernias are present  no hepato-splenomegaly   Pelvis: Clinical staff was present for exam  External genitalia:  normal appearance of the external genitalia including Bartholin's and Tome's glands.  :  urethral meatus normal;  Vaginal:  normal pink mucosa without prolapse or lesions.  Cervix:  normal appearance. stenotic;  Uterus:  normal size, shape and consistency.  Adnexa:  normal bimanual exam of the adnexa.  Rectal:  digital rectal exam not performed; anus visually normal appearing.  Cystocele GRADE 0     Psychiatric: Alert and oriented ×3, mood and affect appropriate  HEENT: Atraumatic, normocephalic, normal scleral icterus  Extremities: 2+ pulses bilaterally, no edema      Lab Review   No data reviewed    Imaging   No data reviewed        Assessment   1. Longstanding infertility with known ovulation and normal semen analysis  2. Unsuccessful HSG secondary to cervical stenosis     Plan   1. Attempted uterine sound and small dilator passage today " unsuccessful.  2. Hysterscopy, dilation of cervix  3. R/B A    No orders of the defined types were placed in this encounter.         This note was electronically signed.      February 26, 2021

## 2021-03-11 ENCOUNTER — TELEPHONE (OUTPATIENT)
Dept: NEUROLOGY | Facility: CLINIC | Age: 36
End: 2021-03-11

## 2021-03-11 DIAGNOSIS — R40.4 TRANSIENT ALTERATION OF AWARENESS: Primary | ICD-10-CM

## 2021-03-11 NOTE — TELEPHONE ENCOUNTER
Provider: FRANCK KIM  Caller: LIDYA ZELAYA  Relationship to Patient: SELF  Pharmacy: [facility] IN [place], [place]  Phone Number: [phone]  Reason for Call: EXPERIENCING EPISODES OF BLACKOUTS/SZS  When was the patient last seen: 2/26/21  When did it start: SEVERAL MONTHS AGO, FIRST EPISODE. SECOND EPISODE ON Monday, 3/8/21.  Characteristics of symptom/severity: SUDDEN NEED TO COUGH AND THEN FEELING LIKE SHE COULDN'T GET ENOUGH AIR. AFTER THIS, SHE BLACKOUTS AND COMES TO IN ABOUT 20-30 SECONDS. PT STATES SHE WAS TOLD SHE MADE A GURGLING NOICE AND STARTED DROOLING DURING BLACKOUT. EXPERIENCING HEADACHES AFTER THE EPISODES.  Timing- Is it constant or intermittent: INTERMITTENT  What makes it worse: NONE  What makes it better: NONE  What therapies/medications have you tried: PT IS ON LAMICTAL FOR MOOD STABILIZATION, NOT SZ.    PT HAS BEEN SCHEDULED FOR SOONEST F/U APPT ON 4/7/21 TO DISCUSS ISSUES AS WELL.    PLEASE REVIEW AND ADVISE.

## 2021-03-19 ENCOUNTER — OFFICE VISIT (OUTPATIENT)
Dept: OBSTETRICS AND GYNECOLOGY | Facility: CLINIC | Age: 36
End: 2021-03-19

## 2021-03-19 VITALS
BODY MASS INDEX: 48.82 KG/M2 | SYSTOLIC BLOOD PRESSURE: 130 MMHG | DIASTOLIC BLOOD PRESSURE: 74 MMHG | WEIGHT: 293 LBS | HEIGHT: 65 IN

## 2021-03-19 DIAGNOSIS — Z31.9 INFERTILITY MANAGEMENT: Primary | ICD-10-CM

## 2021-03-19 PROCEDURE — 99212 OFFICE O/P EST SF 10 MIN: CPT | Performed by: OBSTETRICS & GYNECOLOGY

## 2021-03-19 NOTE — PROGRESS NOTES
Subjective   Chief Complaint   Patient presents with   • Follow-up     discuss surgery     Vicky Lowery is a 36 y.o. year old .  Patient's last menstrual period was 2021.  She presents to be seen because of follow-up of infertility management.  Patient has suspected cervical stenosis that precluded HSG as well as passage of sound in the clinic.  She has established ovulation and normal semen analysis.  At this point given her age she would just prefer to be referred on to reproductive endocrinology..     OTHER COMPLAINTS:  Nothing else    The following portions of the patient's history were reviewed and updated as appropriate:  She  has a past medical history of Anxiety, Anxiety, Asthma, Back pain, Bipolar disorder (CMS/HCC), Depression, Kidney disease, chronic, stage II (mild, EGFR 60+ ml/min), Migraine, Mitral valve prolapse, and Tattoo.  She does not have any pertinent problems on file.  She  has a past surgical history that includes Fibula Fracture Surgery (, ); Mandible fracture surgery (); and [place] tooth extraction.  Her family history includes Arthritis in her maternal grandmother and mother; Breast cancer in her mother; Diabetes in her maternal grandfather; Hypertension in her father; Migraines in her father.  She  reports that she has been smoking. She has never used smokeless tobacco. She reports current alcohol use of about 1.0 standard drinks of alcohol per week. She reports that she does not use drugs.  Current Outpatient Medications   Medication Sig Dispense Refill   • albuterol sulfate  (90 Base) MCG/ACT inhaler Inhale 2 puffs See Admin Instructions. inhale 2 puffs by mouth every 4 to 6 hours as needed     • b complex vitamins capsule Take 1 capsule by mouth Daily. 30 capsule 11   • buPROPion XL (Wellbutrin XL) 150 MG 24 hr tablet One po daily With 300mg 30 tablet 2   • buPROPion XL (Wellbutrin XL) 300 MG 24 hr tablet Take 1 tablet by mouth Daily. 30 tablet  2   • diazePAM (Valium) 5 MG tablet Take 1 tablet by mouth Daily As Needed for Anxiety. 10 tablet 0   • hydroCHLOROthiazide (HYDRODIURIL) 25 MG tablet Take 25 mg by mouth Daily.     • hydroxychloroquine (PLAQUENIL) 200 MG tablet Take  by mouth Daily.     • lamoTRIgine (LaMICtal) 200 MG tablet Take 1 tablet by mouth 2 (Two) Times a Day. 60 tablet 2   • Lurasidone HCl (Latuda) 80 MG tablet tablet Take 1 tablet by mouth Daily. Take 80 mg orally daily with a meal. 30 tablet 1   • propranolol (INDERAL) 60 MG tablet TAKE ONE TABLET BY MOUTH TWICE DAILY 60 tablet 2   • vitamin D (ERGOCALCIFEROL) 1.25 MG (66406 UT) capsule capsule Take 50,000 Units by mouth 1 (One) Time Per Week.       No current facility-administered medications for this visit.     Current Outpatient Medications on File Prior to Visit   Medication Sig   • albuterol sulfate  (90 Base) MCG/ACT inhaler Inhale 2 puffs See Admin Instructions. inhale 2 puffs by mouth every 4 to 6 hours as needed   • b complex vitamins capsule Take 1 capsule by mouth Daily.   • buPROPion XL (Wellbutrin XL) 150 MG 24 hr tablet One po daily With 300mg   • buPROPion XL (Wellbutrin XL) 300 MG 24 hr tablet Take 1 tablet by mouth Daily.   • diazePAM (Valium) 5 MG tablet Take 1 tablet by mouth Daily As Needed for Anxiety.   • hydroCHLOROthiazide (HYDRODIURIL) 25 MG tablet Take 25 mg by mouth Daily.   • hydroxychloroquine (PLAQUENIL) 200 MG tablet Take  by mouth Daily.   • lamoTRIgine (LaMICtal) 200 MG tablet Take 1 tablet by mouth 2 (Two) Times a Day.   • Lurasidone HCl (Latuda) 80 MG tablet tablet Take 1 tablet by mouth Daily. Take 80 mg orally daily with a meal.   • propranolol (INDERAL) 60 MG tablet TAKE ONE TABLET BY MOUTH TWICE DAILY   • vitamin D (ERGOCALCIFEROL) 1.25 MG (30425 UT) capsule capsule Take 50,000 Units by mouth 1 (One) Time Per Week.     No current facility-administered medications on file prior to visit.     She is allergic to triptans and nsaids.    Social  "History    Tobacco Use      Smoking status: Current Some Day Smoker      Smokeless tobacco: Never Used    Review of Systems  Consitutional POS: nothing reported    NEG: anorexia or night sweats   Gastointestinal POS: nothing reported    NEG: bloating, change in bowel habits, melena or reflux symptoms   Genitourinary POS: nothing reported    NEG: dysuria or hematuria   Integument POS: nothing reported    NEG: moles that are changing in size, shape, color or rashes   Breast POS: nothing reported    NEG: persistent breast lump, skin dimpling or nipple discharge         Pertinent items are noted in HPI.          Objective   /74   Ht 165.1 cm (65\")   Wt (!) 207 kg (456 lb)   LMP 02/25/2021   BMI 75.88 kg/m²     General:  well developed; well nourished  no acute distress  obese - Body mass index is 75.88 kg/m².   Skin:  Not performed.   Thyroid: not examined   Lungs:  breathing is unlabored   Heart:  Not performed.   Breasts:  Not performed.   Abdomen: soft, non-tender; no masses  no umbilical or inguinal hernias are present  no hepato-splenomegaly   Pelvis: Not performed.     Psychiatric: Alert and oriented ×3, mood and affect appropriate  HEENT: Atraumatic, normocephalic, normal scleral icterus  Extremities: 2+ pulses bilaterally, no edema      Lab Review   No data reviewed    Imaging   Pelvic ultrasound report        Assessment   1. Infertility management     Plan   1. Referral placed to Dr. Pinon at [facility].  She can discuss options with him.  We will put the hysteroscopy and dilation of the cervix procedure on hold for now.  She will follow-up with me as needed.  2.     No orders of the defined types were placed in this encounter.         This note was electronically signed.      March 19, 2021      "

## 2021-03-22 ENCOUNTER — APPOINTMENT (OUTPATIENT)
Dept: PREADMISSION TESTING | Facility: HOSPITAL | Age: 36
End: 2021-03-22

## 2021-03-30 ENCOUNTER — TELEMEDICINE (OUTPATIENT)
Dept: BEHAVIORAL HEALTH | Facility: CLINIC | Age: 36
End: 2021-03-30

## 2021-03-30 ENCOUNTER — HOSPITAL ENCOUNTER (OUTPATIENT)
Dept: SLEEP MEDICINE | Facility: HOSPITAL | Age: 36
Discharge: HOME | End: 2021-03-30
Admitting: NURSE PRACTITIONER

## 2021-03-30 DIAGNOSIS — R40.4 TRANSIENT ALTERATION OF AWARENESS: ICD-10-CM

## 2021-03-30 DIAGNOSIS — F43.10 POST TRAUMATIC STRESS DISORDER (PTSD): ICD-10-CM

## 2021-03-30 DIAGNOSIS — F31.81 BIPOLAR II DISORDER (HCC): ICD-10-CM

## 2021-03-30 DIAGNOSIS — F41.1 GAD (GENERALIZED ANXIETY DISORDER): Primary | ICD-10-CM

## 2021-03-30 PROCEDURE — 99213 OFFICE O/P EST LOW 20 MIN: CPT | Performed by: NURSE PRACTITIONER

## 2021-03-30 PROCEDURE — 95816 EEG AWAKE AND DROWSY: CPT

## 2021-03-30 RX ORDER — LURASIDONE HYDROCHLORIDE 80 MG/1
80 TABLET, FILM COATED ORAL DAILY
Qty: 30 TABLET | Refills: 1 | Status: SHIPPED | OUTPATIENT
Start: 2021-03-30 | End: 2021-06-15 | Stop reason: SDUPTHER

## 2021-03-30 NOTE — PROGRESS NOTES
Patient Name: Vicky Lowery  MRN: [id number]   :  1985     Chief Complaint:      ICD-10-CM ICD-9-CM   1. MARGIE (generalized anxiety disorder)  F41.1 300.02   2. Bipolar II disorder (CMS/HCC)  F31.81 296.89   3. Post traumatic stress disorder (PTSD)  F43.10 309.81       History of Present Illness: Vicky Lowery is a 36 y.o. female is here today for medication management follow up.  Patient states she still has some irritability however notices it is around her menstrual cycle.  States she is still sensitive to emotions.  Patient noted she had an EEG this a.m. to rule out seizure activity.  Patient states she had an episode where she had a tickle in her throat and was coughing uncontrollably and then remembers passing out.  Wonders if there is any correlation with this with any of her medications.    The following portions of the patient's history were reviewed and updated as appropriate: allergies, current medications, past family history, past medical history, past social history, past surgical history and problem list.    Review of Systems;;  Review of Systems   Constitutional: Negative for activity change, appetite change, fatigue, unexpected weight gain and unexpected weight loss.   Respiratory: Negative for shortness of breath and wheezing.    Gastrointestinal: Negative for constipation, diarrhea, nausea and vomiting.   Musculoskeletal: Negative for gait problem.   Skin: Negative for dry skin and rash.   Neurological: Negative for dizziness, speech difficulty, weakness, light-headedness, headache, memory problem and confusion.   Psychiatric/Behavioral: Positive for stress. Negative for agitation, behavioral problems, decreased concentration, dysphoric mood, hallucinations, self-injury, sleep disturbance, suicidal ideas, negative for hyperactivity and depressed mood. The patient is not nervous/anxious.    Possible seizures    Physical Exam;;  Physical Exam  Vitals and nursing note  reviewed.   Constitutional:       General: She is not in acute distress.     Appearance: She is well-developed. She is not diaphoretic.   HENT:      Head: Normocephalic and atraumatic.   Eyes:      Conjunctiva/sclera: Conjunctivae normal.   Cardiovascular:      Rate and Rhythm: Normal rate.   Pulmonary:      Effort: Pulmonary effort is normal. No respiratory distress.   Musculoskeletal:         General: Normal range of motion.      Cervical back: Full passive range of motion without pain and normal range of motion.   Skin:     General: Skin is warm and dry.   Neurological:      Mental Status: She is alert and oriented to person, place, and time.   Psychiatric:         Mood and Affect: Mood is not anxious or depressed. Affect is not labile, blunt, angry or inappropriate.         Speech: Speech is not rapid and pressured or tangential.         Behavior: Behavior normal. Behavior is not agitated, slowed, aggressive, withdrawn, hyperactive or combative. Behavior is cooperative.         Thought Content: Thought content normal. Thought content is not paranoid or delusional. Thought content does not include homicidal or suicidal ideation. Thought content does not include homicidal or suicidal plan.         Judgment: Judgment normal.       not currently breastfeeding.  There is no height or weight on file to calculate BMI.    Current Medications;;    Current Outpatient Medications:   •  albuterol sulfate  (90 Base) MCG/ACT inhaler, Inhale 2 puffs See Admin Instructions. inhale 2 puffs by mouth every 4 to 6 hours as needed, Disp: , Rfl:   •  b complex vitamins capsule, Take 1 capsule by mouth Daily., Disp: 30 capsule, Rfl: 11  •  buPROPion XL (Wellbutrin XL) 150 MG 24 hr tablet, One po daily With 300mg, Disp: 30 tablet, Rfl: 2  •  buPROPion XL (Wellbutrin XL) 300 MG 24 hr tablet, Take 1 tablet by mouth Daily., Disp: 30 tablet, Rfl: 2  •  diazePAM (Valium) 5 MG tablet, Take 1 tablet by mouth Daily As Needed for  Anxiety., Disp: 10 tablet, Rfl: 0  •  hydroCHLOROthiazide (HYDRODIURIL) 25 MG tablet, Take 25 mg by mouth Daily., Disp: , Rfl:   •  hydroxychloroquine (PLAQUENIL) 200 MG tablet, Take  by mouth Daily., Disp: , Rfl:   •  lamoTRIgine (LaMICtal) 200 MG tablet, Take 1 tablet by mouth 2 (Two) Times a Day., Disp: 60 tablet, Rfl: 2  •  Lurasidone HCl (Latuda) 80 MG tablet tablet, Take 1 tablet by mouth Daily. Take 80 mg orally daily with a meal., Disp: 30 tablet, Rfl: 1  •  propranolol (INDERAL) 60 MG tablet, TAKE ONE TABLET BY MOUTH TWICE DAILY, Disp: 60 tablet, Rfl: 2  •  vitamin D (ERGOCALCIFEROL) 1.25 MG (85418 UT) capsule capsule, Take 50,000 Units by mouth 1 (One) Time Per Week., Disp: , Rfl:     Lab Results:   Orders Only on 01/21/2021   Component Date Value Ref Range Status   • Amphetamine, Urine Qual 01/21/2021 Negative  Zeoxsd=2012 ng/mL Final   • Barbiturates Screen, Urine 01/21/2021 Negative  Jqjttw=141 ng/mL Final   • Benzodiazepine Screen, Urine 01/21/2021 Positive* Hasmti=853 ng/mL Final   • THC Screen, Urine 01/21/2021 Positive* Cutoff=20 ng/mL Final   • Cocaine Screen, Urine 01/21/2021 Negative  Ngbtac=664 ng/mL Final   • Opiate Screen, Urine 01/21/2021 Negative  Mptygu=266 ng/mL Final    Opiate test includes Codeine, Morphine, Hydromorphone, Hydrocodone.   • Oxycodone/Oxymorphone, Urine 01/21/2021 Negative  Oidzjv=682 ng/mL Final    Test includes Oxycodone and Oxymorphone   • Phencyclidine (PCP), Urine 01/21/2021 Negative  Cutoff=25 ng/mL Final   • Methadone Screen, Urine 01/21/2021 Negative  Dmpgmp=789 ng/mL Final   • Propoxyphene Screen 01/21/2021 Negative  Nbqvok=217 ng/mL Final   • Creatinine, Urine 01/21/2021 131.7  20.0 - 300.0 mg/dL Final   • pH, UA 01/21/2021 8.4  4.5 - 8.9 Final   • Please note 01/21/2021 Comment   Final    Comment: This assay provides a preliminary unconfirmed analytical test  result that may be suitable for clinical management of patients  in certain situations. Drug-test  results should be interpreted  in the context of clinical information. Patient metabolic  variables, specific drug chemistry, and specimen  characteristics can affect test outcome. Technical consultation  is available if a test result is inconsistent with an expected  outcome. ([email or web address] or call toll-free  [phone])         Mental Status Exam:   Hygiene:   good  Cooperation:  Cooperative  Eye Contact:  Good  Psychomotor Behavior:  Appropriate  Mood:within normal limits  Affect:  Appropriate  Hopelessness: Denies  Speech:  Normal  Thought Process:  Goal directed  Thought Content:  Normal  Suicidal:  None  Homicidal:  None  Hallucinations:  None  Delusion:  None  Memory:  Intact  Orientation:  Person, Place, Time and Situation  Reliability:  good  Insight:  Good  Judgement:  Good  Impulse Control:  Good  Physical/Medical Issues:  No     PHQ-9 Depression Screening  Little interest or pleasure in doing things?     Feeling down, depressed, or hopeless?     Trouble falling or staying asleep, or sleeping too much?     Feeling tired or having little energy?     Poor appetite or overeating?     Feeling bad about yourself - or that you are a failure or have let yourself or your family down?     Trouble concentrating on things, such as reading the newspaper or watching television?     Moving or speaking so slowly that other people could have noticed? Or the opposite - being so fidgety or restless that you have been moving around a lot more than usual?     Thoughts that you would be better off dead, or of hurting yourself in some way?     PHQ-9 Total Score     If you checked off any problems, how difficult have these problems made it for you to do your work, take care of things at home, or get along with other people?          Assessment/Plan:  Diagnoses and all orders for this visit:    1. MARGIE (generalized anxiety disorder) (Primary)    2. Bipolar II disorder (CMS/[facility])  -     Lurasidone HCl (Latuda)  80 MG tablet tablet; Take 1 tablet by mouth Daily. Take 80 mg orally daily with a meal.  Dispense: 30 tablet; Refill: 1    3. Post traumatic stress disorder (PTSD)      Continue medication as ordered for now.  Will reevaluate dosing of Wellbutrin and whether or not it should be discontinued after patient sees her neurologist to rule out any seizure activity.  Patient will update us after this appointment. My Chart appt start time 1:05pm end time 1:16am.    A psychological evaluation was conducted in order to assess past and current level of functioning. Areas assessed included, but were not limited to: perception of social support, perception of ability to face and deal with challenges in life (positive functioning), anxiety symptoms, depressive symptoms, perspective on beliefs/belief system, coping skills for stress, intelligence level,  Therapeutic rapport was established. Interventions conducted today were geared towards incorporating medication management along with support for continued therapy. Education was also provided as to the med management with this provider and what to expect in subsequent sessions.    We discussed risks, benefits,goals and side effects of the above medication and the patient was agreeable with the plan.Patient was educated on the importance of compliance with treatment and follow-up appointments. Patient is aware to contact the [facility] with any worsening of symptoms. To call for questions or concerns and return early if necessary. Patent is agreeable to go to the Emergency Department or call 911 should they begin SI/HI.     Treatment Plan:   Discussed risks, benefits, and alternatives of medication. Encouraged healthy habits (eating, exercise and sleep). Call if any questions or problems arise. Medication reconciled. Controlled substance monitoring report reviewed. Provided psychoeducation.. Discussed coping strategies and current stressors. Set appropriate boundaries and  limits for patient's well-being. Use distraction techniques to improve symptoms. Access support networks.      Return in about 4 weeks (around 4/27/2021) for Video visit.    Minoo Harrington, APRN

## 2021-04-07 ENCOUNTER — OFFICE VISIT (OUTPATIENT)
Dept: NEUROLOGY | Facility: CLINIC | Age: 36
End: 2021-04-07

## 2021-04-07 VITALS
WEIGHT: 293 LBS | DIASTOLIC BLOOD PRESSURE: 80 MMHG | HEART RATE: 107 BPM | TEMPERATURE: 97.8 F | OXYGEN SATURATION: 97 % | BODY MASS INDEX: 48.82 KG/M2 | SYSTOLIC BLOOD PRESSURE: 132 MMHG | HEIGHT: 65 IN

## 2021-04-07 DIAGNOSIS — R40.4 TRANSIENT ALTERATION OF AWARENESS: ICD-10-CM

## 2021-04-07 DIAGNOSIS — G43.719 INTRACTABLE CHRONIC MIGRAINE WITHOUT AURA AND WITHOUT STATUS MIGRAINOSUS: Primary | ICD-10-CM

## 2021-04-07 PROCEDURE — 99214 OFFICE O/P EST MOD 30 MIN: CPT | Performed by: NURSE PRACTITIONER

## 2021-04-07 NOTE — PROGRESS NOTES
Follow Up Neurology Office Visit      Patient Name: Vicky Lowery    Referring Physician: No ref. provider found    Chief Complaint:    Chief Complaint   Patient presents with   • Syncope     Patient in office for new onset of syncope episodes.  Patient states these sx started mid January 2021.       History of Present Illness: Vicky Lowery is a 36 y.o. female who is here to follow up with Neurology due to reported concern for seizure episode.  Describes recent episodes of having strong cough, then feeling faint when having coughing spell. Recent phone call describes as  SEVERAL MONTHS AGO, FIRST EPISODE. SECOND EPISODE ON Monday, 3/8/21.  Characteristics of symptom/severity: SUDDEN NEED TO COUGH AND THEN FEELING LIKE SHE COULDN'T GET ENOUGH AIR. AFTER THIS, SHE BLACKOUTS AND COMES TO IN ABOUT 20-30 SECONDS. PT STATES SHE WAS TOLD SHE MADE A GURGLING NOICE AND STARTED DROOLING DURING BLACKOUT. EXPERIENCING HEADACHES AFTER THE EPISODES.    She denies any associated abnormal limb movements, incontinence, or fatigue following episodes.  Episodes have only occurred after having strong coughing spell.    The following portions of the patient's history were reviewed and updated as appropriate: allergies, current medications, past family history, past medical history, past social history, past surgical history and problem list.    Subjective     Review of Systems:   Review of Systems   Constitutional: Positive for fatigue. Negative for activity change.   HENT: Negative for drooling and voice change.    Eyes: Negative for blurred vision, double vision, photophobia and visual disturbance.   Respiratory: Negative for shortness of breath.    Cardiovascular: Negative for chest pain and palpitations.   Gastrointestinal: Negative for nausea and vomiting.   Genitourinary: Negative for urinary incontinence.   Musculoskeletal: Negative for arthralgias, back pain, gait problem, myalgias and neck pain.    Allergic/Immunologic: Negative for immunocompromised state.   Neurological: Positive for syncope and weakness. Negative for dizziness, tremors, seizures, facial asymmetry, speech difficulty, light-headedness, numbness, headache, memory problem and confusion.   Hematological: Does not bruise/bleed easily.   Psychiatric/Behavioral: Negative for decreased concentration, dysphoric mood, hallucinations, sleep disturbance, depressed mood and stress. The patient is not nervous/anxious.        Medications:     Current Outpatient Medications:   •  albuterol sulfate  (90 Base) MCG/ACT inhaler, Inhale 2 puffs See Admin Instructions. inhale 2 puffs by mouth every 4 to 6 hours as needed, Disp: , Rfl:   •  b complex vitamins capsule, Take 1 capsule by mouth Daily., Disp: 30 capsule, Rfl: 11  •  buPROPion XL (Wellbutrin XL) 150 MG 24 hr tablet, One po daily With 300mg, Disp: 30 tablet, Rfl: 2  •  buPROPion XL (Wellbutrin XL) 300 MG 24 hr tablet, Take 1 tablet by mouth Daily., Disp: 30 tablet, Rfl: 2  •  diazePAM (Valium) 5 MG tablet, Take 1 tablet by mouth Daily As Needed for Anxiety., Disp: 10 tablet, Rfl: 0  •  hydroCHLOROthiazide (HYDRODIURIL) 25 MG tablet, Take 25 mg by mouth Daily., Disp: , Rfl:   •  hydroxychloroquine (PLAQUENIL) 200 MG tablet, Take  by mouth Daily., Disp: , Rfl:   •  lamoTRIgine (LaMICtal) 200 MG tablet, Take 1 tablet by mouth 2 (Two) Times a Day., Disp: 60 tablet, Rfl: 2  •  Lurasidone HCl (Latuda) 80 MG tablet tablet, Take 1 tablet by mouth Daily. Take 80 mg orally daily with a meal., Disp: 30 tablet, Rfl: 1  •  propranolol (INDERAL) 60 MG tablet, TAKE ONE TABLET BY MOUTH TWICE DAILY, Disp: 60 tablet, Rfl: 2  •  vitamin D (ERGOCALCIFEROL) 1.25 MG (21493 UT) capsule capsule, Take 50,000 Units by mouth 1 (One) Time Per Week., Disp: , Rfl:   •  ubrogepant (ubrogepant) 100 MG tablet, Take 1 tablet by mouth 1 (One) Time As Needed (migraine) for up to 2 doses., Disp: 4 tablet, Rfl: 0    Allergies:  "  Allergies   Allergen Reactions   • Triptans Swelling     Felt like throat was closing up   • Nsaids Other (See Comments)     Developed renal insufficiency after long term use       Objective     Physical Exam:  Vital Signs:   Vitals:    04/07/21 1548   BP: 132/80   Pulse: 107   Temp: 97.8 °F (36.6 °C)   SpO2: 97%   Weight: (!) 203 kg (447 lb 3.2 oz)   Height: 165.1 cm (65\")   PainSc: 0-No pain       Physical Exam  Vitals and nursing note reviewed.   Constitutional:       Appearance: She is morbidly obese.   Eyes:      Extraocular Movements: EOM normal.      Pupils: Pupils are equal, round, and reactive to light.   Neck:      Vascular: No carotid bruit.   Cardiovascular:      Rate and Rhythm: Regular rhythm.      Heart sounds: Normal heart sounds.   Pulmonary:      Effort: Pulmonary effort is normal.      Breath sounds: Normal breath sounds. No wheezing.   Skin:     General: Skin is warm.   Neurological:      Mental Status: She is oriented to person, place, and time.      Coordination: Romberg Test normal.      Gait: Gait is intact.      Deep Tendon Reflexes: Strength normal.   Psychiatric:         Mood and Affect: Mood normal.         Speech: Speech normal.         Behavior: Behavior normal.         Thought Content: Thought content normal.         Judgment: Judgment normal.         Neurologic Exam     Mental Status   Oriented to person, place, and time.   Attention: normal. Concentration: normal.   Speech: speech is normal   Level of consciousness: alert    Cranial Nerves     CN II   Visual fields full to confrontation.   Visual acuity: normal with correction    CN III, IV, VI   Pupils are equal, round, and reactive to light.  Extraocular motions are normal.   Right pupil: Shape: regular. Reactivity: brisk.   Left pupil: Shape: regular. Reactivity: brisk.   Diplopia: none  Ophthalmoparesis: none  Upgaze: normal  Downgaze: normal    CN V   Facial sensation intact.     CN VII   Facial expression full, symmetric. "     CN VIII   CN VIII normal.     CN IX, X   CN IX normal.   CN X normal.     CN XI   CN XI normal.     CN XII   CN XII normal.     Motor Exam   Muscle bulk: normal  Overall muscle tone: normal  Right arm pronator drift: absent  Left arm pronator drift: absent    Strength   Strength 5/5 throughout.     Sensory Exam   Light touch normal.     Gait, Coordination, and Reflexes     Gait  Gait: normal    Coordination   Romberg: negative    Tremor   Resting tremor: absent    Technical Summary:      A 19 channel digital EEG was performed using the international 10-20 placement system, including eye leads and EKG leads.     Duration: 22 minutes      Video: None     Findings:     The awake tracing shows diffuse low amplitude 5-8 Hz theta which is present symmetrically over both hemispheres, along with low amplitude EMG artifact present somewhat diffusely.  Photic stimulation does not change the background.  Hyperventilation does not elicit abnormality.  Stage II sleep is not clearly seen.  No focal slowing is seen.  No epileptiform activity is seen.     EKG: Regular, 80 bpm     IMPRESSION:     Normal EEG in the awake state     No epileptiform activity is seen    Results Review:   I have reviewed the patient's other medical records to include, labs, radiology and referrals.     Assessment / Plan      Assessment/Plan:   Diagnoses and all orders for this visit:    1. Intractable chronic migraine without aura and without status migrainosus (Primary)  -     ubrogepant (ubrogepant) 100 MG tablet; Take 1 tablet by mouth 1 (One) Time As Needed (migraine) for up to 2 doses.  Dispense: 4 tablet; Refill: 0    2. Transient alteration of awareness    Patient I discussed her recent symptoms of lightheadedness and transient alteration in awareness following strong coughing spells.  I discussed with her that I do not think these are consistent with seizure disorder, and we reviewed her outpatient EEG.  Patient was reassured, she states that  she also does not feel that these were seizure episodes, she declined need for further testing including EMU stay today.  Her headache frequency remains well controlled with current regimen, she has requested additional samples of Ubrelvy and these were provided today.    Follow Up:   Return for Next scheduled follow up.     Alia Quinonez APRSHAGGY  [facility]   AS THE PROVIDER, I PERSONALLY WORE PPE DURING ENTIRE FACE TO FACE ENCOUNTER IN CLINIC WITH THE PATIENT. PATIENT ALSO WORE PPE DURING ENTIRE FACE TO FACE ENCOUNTER EXCEPT FOR A MAX OF 30 SECONDS DURING NEUROLOGICAL EVALUATION OF CRANIAL NERVES AND THEN MASK WAS PLACED BACK OVER PATIENT FACE FOR REMAINDER OF VISIT. I WASHED MY HANDS BEFORE AND AFTER VISIT.      Please note that portions of this note may have been completed with a voice recognition program. Efforts were made to edit the dictations, but occasionally words are mistranscribed.

## 2021-04-28 ENCOUNTER — TELEPHONE (OUTPATIENT)
Dept: NEUROLOGY | Facility: CLINIC | Age: 36
End: 2021-04-28

## 2021-04-28 DIAGNOSIS — G43.719 INTRACTABLE CHRONIC MIGRAINE WITHOUT AURA AND WITHOUT STATUS MIGRAINOSUS: ICD-10-CM

## 2021-04-28 NOTE — TELEPHONE ENCOUNTER
Provider:  DOMINIQUE COOPER  Caller: LIDYA ZELAYA  Relationship to Patient: SELF  Pharmacy: [facility] [address] [phone] [facility] [phone] FX      Reason for Call: PATIENT CALLING IN STATING HER MIGRAINES ARE GETTING WORSE. SHE WAS WONDERING IF SHE COULD GET SAMPLES OF THE ubrogepant (ubrogepant) 100 MG tablet [[id number]]     Order Details  Dose: 100 mg Route: Oral Frequency: Once As Needed for migraine   Dispense Quantity: 4 tablet Refills      OR MAYBE SHE COULD CALL IN A RX TO HER PHARMACY BECAUSE SHE NOW HAS A DIFFERENT INSURANCE.   SHE STATES THAT THE MIGRAINE IS MOVING AROUND AND SHE IS GETTING A BURNING SENSATION. PLEASE CALL PATIENT TO DISCUSS.

## 2021-05-21 ENCOUNTER — APPOINTMENT (OUTPATIENT)
Dept: GENERAL RADIOLOGY | Facility: HOSPITAL | Age: 36
End: 2021-05-21

## 2021-05-21 ENCOUNTER — HOSPITAL ENCOUNTER (EMERGENCY)
Facility: HOSPITAL | Age: 36
Discharge: HOME | End: 2021-05-21
Attending: EMERGENCY MEDICINE | Admitting: EMERGENCY MEDICINE

## 2021-05-21 VITALS
WEIGHT: 293 LBS | TEMPERATURE: 98.9 F | DIASTOLIC BLOOD PRESSURE: 92 MMHG | HEIGHT: 65 IN | RESPIRATION RATE: 17 BRPM | BODY MASS INDEX: 48.82 KG/M2 | HEART RATE: 101 BPM | OXYGEN SATURATION: 96 % | SYSTOLIC BLOOD PRESSURE: 156 MMHG

## 2021-05-21 DIAGNOSIS — S99.912A INJURY OF LEFT ANKLE, INITIAL ENCOUNTER: Primary | ICD-10-CM

## 2021-05-21 PROCEDURE — 73630 X-RAY EXAM OF FOOT: CPT

## 2021-05-21 PROCEDURE — 73110 X-RAY EXAM OF WRIST: CPT

## 2021-05-21 PROCEDURE — 99284 EMERGENCY DEPT VISIT MOD MDM: CPT

## 2021-05-21 PROCEDURE — 99283 EMERGENCY DEPT VISIT LOW MDM: CPT

## 2021-05-21 PROCEDURE — 73610 X-RAY EXAM OF ANKLE: CPT

## 2021-05-21 PROCEDURE — 73120 X-RAY EXAM OF HAND: CPT

## 2021-05-21 RX ORDER — TIZANIDINE 4 MG/1
4 TABLET ORAL EVERY 8 HOURS PRN
Qty: 20 TABLET | Refills: 0 | Status: SHIPPED | OUTPATIENT
Start: 2021-05-21

## 2021-05-21 RX ORDER — HYDROCODONE BITARTRATE AND ACETAMINOPHEN 5; 325 MG/1; MG/1
1 TABLET ORAL ONCE
Status: COMPLETED | OUTPATIENT
Start: 2021-05-21 | End: 2021-05-21

## 2021-05-21 RX ADMIN — HYDROCODONE BITARTRATE AND ACETAMINOPHEN 1 TABLET: 5; 325 TABLET ORAL at 20:59

## 2021-05-22 NOTE — ED PROVIDER NOTES
Subjective   36-year-old female presents the ER with complaints of fall.  Patient verbalizes that she is fallen twice today, both falls were mechanical in nature.  Patient states that she injured her left and right wrist and left and right hand.  Patient is also complaining of pain within her left ankle.  Patient is able to ambulate with some discomfort.          Review of Systems   Constitutional: Negative.  Negative for fever.   HENT: Negative.    Respiratory: Negative.    Cardiovascular: Negative.  Negative for chest pain.   Gastrointestinal: Negative.  Negative for abdominal pain.   Endocrine: Negative.    Genitourinary: Negative.  Negative for dysuria.   Musculoskeletal: Positive for arthralgias.   Skin: Negative.    Neurological: Negative.    Psychiatric/Behavioral: Negative.    All other systems reviewed and are negative.      Past Medical History:   Diagnosis Date   • Anxiety    • Anxiety    • Asthma    • Back pain    • Bipolar disorder (CMS/[facility])    • Depression    • Kidney disease, chronic, stage II (mild, EGFR 60+ ml/min)    • Migraine    • Mitral valve prolapse    • Tattoo        Allergies   Allergen Reactions   • Triptans Swelling     Felt like throat was closing up   • Nsaids Other (See Comments)     Developed renal insufficiency after long term use       Past Surgical History:   Procedure Laterality Date   • FIBULA FRACTURE SURGERY  2014, 2015   • MANDIBLE FRACTURE SURGERY  2002   • WISDOM TOOTH EXTRACTION         Family History   Problem Relation Age of Onset   • Breast cancer Mother    • Arthritis Mother    • Diabetes Maternal Grandfather    • Hypertension Father    • Migraines Father    • Arthritis Maternal Grandmother    • Colon cancer Neg Hx        Social History     Socioeconomic History   • Marital status:      Spouse name: Not on file   • Number of children: Not on file   • Years of education: Not on file   • Highest education level: Not on file   Tobacco Use   • Smoking status: Current  Some Day Smoker   • Smokeless tobacco: Never Used   Vaping Use   • Vaping Use: Never used   Substance and Sexual Activity   • Alcohol use: Yes     Alcohol/week: 1.0 standard drinks     Types: 1 Glasses of wine per week     Comment: social   • Drug use: No   • Sexual activity: Yes     Partners: Male     Birth control/protection: None           Objective   Physical Exam  Vitals and nursing note reviewed.   Constitutional:       General: She is not in acute distress.     Appearance: She is well-developed. She is not diaphoretic.   HENT:      Head: Normocephalic and atraumatic.      Right Ear: External ear normal.      Left Ear: External ear normal.      Nose: Nose normal.   Eyes:      Conjunctiva/sclera: Conjunctivae normal.   Neck:      Vascular: No JVD.      Trachea: No tracheal deviation.   Cardiovascular:      Rate and Rhythm: Normal rate and regular rhythm.      Heart sounds: No murmur heard.     Pulmonary:      Effort: Pulmonary effort is normal. No respiratory distress.      Breath sounds: No wheezing.   Abdominal:      Palpations: Abdomen is soft.      Tenderness: There is no abdominal tenderness.   Musculoskeletal:         General: Tenderness present. No deformity.      Cervical back: Normal range of motion and neck supple.      Comments: Bilateral tenderness of the wrist as well as the hand.  There is tenderness of the lateral left ankle with tenderness of the dorsum of the left foot.   Skin:     General: Skin is warm and dry.      Coloration: Skin is not pale.      Findings: No erythema or rash.   Neurological:      Mental Status: She is alert and oriented to person, place, and time.      Cranial Nerves: No cranial nerve deficit.   Psychiatric:         Behavior: Behavior normal.         Thought Content: Thought content normal.         Procedures           ED Course                                           MDM  Number of Diagnoses or Management Options  Injury of left ankle, initial encounter: new and  requires workup     Amount and/or Complexity of Data Reviewed  Tests in the radiology section of CPT®: ordered and reviewed  Independent visualization of images, tracings, or specimens: yes    Risk of Complications, Morbidity, and/or Mortality  Presenting problems: low  Diagnostic procedures: low  Management options: low    Patient Progress  Patient progress: stable      Final diagnoses:   Injury of left ankle, initial encounter       ED Disposition  ED Disposition     ED Disposition Condition Comment    Discharge Stable           Alban King MD  [address] [facility]  [address]  [facility] [address] [phone]    Schedule an appointment as soon as possible for a visit            Medication List      New Prescriptions    tiZANidine 4 MG tablet  Commonly known as: ZANAFLEX  Take 1 tablet by mouth Every 8 (Eight) Hours As Needed for Muscle Spasms.           Where to Get Your Medications      These medications were sent to [id number] - [place] - [address] AT [facility] - [phone] PH - [phone] FX  [address] [place] [address]    Phone: [phone]   · tiZANidine 4 MG tablet          Sivakumar Rojas II, PA  05/21/21 5250

## 2021-06-15 ENCOUNTER — TELEMEDICINE (OUTPATIENT)
Dept: BEHAVIORAL HEALTH | Facility: CLINIC | Age: 36
End: 2021-06-15

## 2021-06-15 DIAGNOSIS — F43.10 POST TRAUMATIC STRESS DISORDER (PTSD): ICD-10-CM

## 2021-06-15 DIAGNOSIS — F41.1 GAD (GENERALIZED ANXIETY DISORDER): Primary | ICD-10-CM

## 2021-06-15 DIAGNOSIS — F31.81 BIPOLAR II DISORDER (HCC): ICD-10-CM

## 2021-06-15 PROCEDURE — 99213 OFFICE O/P EST LOW 20 MIN: CPT | Performed by: NURSE PRACTITIONER

## 2021-06-15 RX ORDER — LAMOTRIGINE 200 MG/1
200 TABLET ORAL 2 TIMES DAILY
Qty: 60 TABLET | Refills: 2 | Status: SHIPPED | OUTPATIENT
Start: 2021-06-15 | End: 2021-09-21 | Stop reason: SDUPTHER

## 2021-06-15 RX ORDER — BUPROPION HYDROCHLORIDE 150 MG/1
TABLET ORAL
Qty: 30 TABLET | Refills: 2 | Status: SHIPPED | OUTPATIENT
Start: 2021-06-15 | End: 2021-09-21 | Stop reason: SDUPTHER

## 2021-06-15 RX ORDER — LURASIDONE HYDROCHLORIDE 80 MG/1
80 TABLET, FILM COATED ORAL DAILY
Qty: 30 TABLET | Refills: 1 | Status: SHIPPED | OUTPATIENT
Start: 2021-06-15 | End: 2021-09-21 | Stop reason: SDUPTHER

## 2021-06-15 RX ORDER — DIAZEPAM 5 MG/1
5 TABLET ORAL DAILY PRN
Qty: 10 TABLET | Refills: 0 | Status: SHIPPED | OUTPATIENT
Start: 2021-06-15 | End: 2021-09-21 | Stop reason: SDUPTHER

## 2021-06-15 NOTE — PROGRESS NOTES
Patient Name: Vicky Lowery  MRN: [id number]   :  1985     Chief Complaint:      ICD-10-CM ICD-9-CM   1. MARGIE (generalized anxiety disorder)  F41.1 300.02   2. Bipolar II disorder (CMS/HCC)  F31.81 296.89   3. Post traumatic stress disorder (PTSD)  F43.10 309.81       History of Present Illness: Vicky Lowery is a 36 y.o. female is here today for medication management follow up.  Patient states she is having some bad days but not as often as she did in the past.  Does have states she shuts down and isolates.  States sleep is alberto.  Sometimes does not go to sleep until 2 AM however once she is asleep she stays asleep.  States she is having a difficult time remembering to take Latuda in the evening.    The following portions of the patient's history were reviewed and updated as appropriate: allergies, current medications, past family history, past medical history, past social history, past surgical history and problem list.    Review of Systems;;  Review of Systems   Constitutional: Negative for activity change, appetite change, fatigue, unexpected weight gain and unexpected weight loss.   Respiratory: Negative for shortness of breath and wheezing.    Gastrointestinal: Negative for constipation, diarrhea, nausea and vomiting.   Musculoskeletal: Negative for gait problem.   Skin: Negative for dry skin and rash.   Neurological: Negative for dizziness, speech difficulty, weakness, light-headedness, headache, memory problem and confusion.   Psychiatric/Behavioral: Positive for sleep disturbance, depressed mood and stress. Negative for agitation, behavioral problems, decreased concentration, dysphoric mood, hallucinations, self-injury, suicidal ideas and negative for hyperactivity. The patient is nervous/anxious.        Physical Exam;;  Physical Exam  Vitals and nursing note reviewed.   Constitutional:       General: She is not in acute distress.     Appearance: She is well-developed. She is not  diaphoretic.   HENT:      Head: Normocephalic and atraumatic.   Eyes:      Conjunctiva/sclera: Conjunctivae normal.   Cardiovascular:      Rate and Rhythm: Normal rate.   Pulmonary:      Effort: Pulmonary effort is normal. No respiratory distress.   Musculoskeletal:         General: Normal range of motion.      Cervical back: Full passive range of motion without pain and normal range of motion.   Skin:     General: Skin is warm and dry.   Neurological:      Mental Status: She is alert and oriented to person, place, and time.   Psychiatric:         Mood and Affect: Mood is anxious and depressed. Affect is not labile, blunt, angry or inappropriate.         Speech: Speech is not rapid and pressured or tangential.         Behavior: Behavior normal. Behavior is not agitated, slowed, aggressive, withdrawn, hyperactive or combative. Behavior is cooperative.         Thought Content: Thought content normal. Thought content is not paranoid or delusional. Thought content does not include homicidal or suicidal ideation. Thought content does not include homicidal or suicidal plan.         Judgment: Judgment normal.       not currently breastfeeding.  There is no height or weight on file to calculate BMI.    Current Medications;;    Current Outpatient Medications:   •  albuterol sulfate  (90 Base) MCG/ACT inhaler, Inhale 2 puffs See Admin Instructions. inhale 2 puffs by mouth every 4 to 6 hours as needed, Disp: , Rfl:   •  b complex vitamins capsule, Take 1 capsule by mouth Daily., Disp: 30 capsule, Rfl: 11  •  buPROPion XL (Wellbutrin XL) 150 MG 24 hr tablet, One po daily With 300mg, Disp: 30 tablet, Rfl: 2  •  diazePAM (Valium) 5 MG tablet, Take 1 tablet by mouth Daily As Needed for Anxiety., Disp: 10 tablet, Rfl: 0  •  hydroCHLOROthiazide (HYDRODIURIL) 25 MG tablet, Take 25 mg by mouth Daily., Disp: , Rfl:   •  hydroxychloroquine (PLAQUENIL) 200 MG tablet, Take  by mouth Daily., Disp: , Rfl:   •  lamoTRIgine (LaMICtal)  200 MG tablet, Take 1 tablet by mouth 2 (Two) Times a Day., Disp: 60 tablet, Rfl: 2  •  Lurasidone HCl (Latuda) 80 MG tablet tablet, Take 1 tablet by mouth Daily. Take 80 mg orally daily with a meal., Disp: 30 tablet, Rfl: 1  •  propranolol (INDERAL) 60 MG tablet, TAKE ONE TABLET BY MOUTH TWICE DAILY, Disp: 60 tablet, Rfl: 2  •  sulfamethoxazole-trimethoprim (BACTRIM DS,SEPTRA DS) 800-160 MG per tablet, Take 1 tablet by mouth 2 (Two) Times a Day for 7 days., Disp: 14 tablet, Rfl: 0  •  tiZANidine (ZANAFLEX) 4 MG tablet, Take 1 tablet by mouth Every 8 (Eight) Hours As Needed for Muscle Spasms., Disp: 20 tablet, Rfl: 0  •  ubrogepant (ubrogepant) 100 MG tablet, Take 1 tablet by mouth 1 (One) Time As Needed (migraine) for up to 2 doses., Disp: 10 tablet, Rfl: 5  •  vitamin D (ERGOCALCIFEROL) 1.25 MG (53683 UT) capsule capsule, Take 50,000 Units by mouth 1 (One) Time Per Week., Disp: , Rfl:     Lab Results:   No visits with results within 3 Month(s) from this visit.   Latest known visit with results is:   Orders Only on 01/21/2021   Component Date Value Ref Range Status   • Amphetamine, Urine Qual 01/21/2021 Negative  Lctqvf=5031 ng/mL Final   • Barbiturates Screen, Urine 01/21/2021 Negative  Xdxkei=563 ng/mL Final   • Benzodiazepine Screen, Urine 01/21/2021 Positive* Ccdldv=108 ng/mL Final   • THC Screen, Urine 01/21/2021 Positive* Cutoff=20 ng/mL Final   • Cocaine Screen, Urine 01/21/2021 Negative  Biyqtg=083 ng/mL Final   • Opiate Screen, Urine 01/21/2021 Negative  Mjfbwj=357 ng/mL Final    Opiate test includes Codeine, Morphine, Hydromorphone, Hydrocodone.   • Oxycodone/Oxymorphone, Urine 01/21/2021 Negative  Fptbef=320 ng/mL Final    Test includes Oxycodone and Oxymorphone   • Phencyclidine (PCP), Urine 01/21/2021 Negative  Cutoff=25 ng/mL Final   • Methadone Screen, Urine 01/21/2021 Negative  Vvhzqm=291 ng/mL Final   • Propoxyphene Screen 01/21/2021 Negative  Wgaojq=038 ng/mL Final   • Creatinine, Urine 01/21/2021  131.7  20.0 - 300.0 mg/dL Final   • pH, UA 01/21/2021 8.4  4.5 - 8.9 Final   • Please note 01/21/2021 Comment   Final    Comment: This assay provides a preliminary unconfirmed analytical test  result that may be suitable for clinical management of patients  in certain situations. Drug-test results should be interpreted  in the context of clinical information. Patient metabolic  variables, specific drug chemistry, and specimen  characteristics can affect test outcome. Technical consultation  is available if a test result is inconsistent with an expected  outcome. (email-[email or web address] or call toll-free  [phone])         Mental Status Exam:   Hygiene:   good  Cooperation:  Cooperative  Eye Contact:  Good  Psychomotor Behavior:  Appropriate  Mood:anxious and depressed  Affect:  Appropriate  Hopelessness: Denies  Speech:  Normal  Thought Process:  Goal directed  Thought Content:  Normal  Suicidal:  None  Homicidal:  None  Hallucinations:  None  Delusion:  None  Memory:  Intact  Orientation:  Person, Place, Time and Situation  Reliability:  good  Insight:  Good  Judgement:  Good  Impulse Control:  Good  Physical/Medical Issues:  No     PHQ-9 Depression Screening  Little interest or pleasure in doing things?     Feeling down, depressed, or hopeless?     Trouble falling or staying asleep, or sleeping too much?     Feeling tired or having little energy?     Poor appetite or overeating?     Feeling bad about yourself - or that you are a failure or have let yourself or your family down?     Trouble concentrating on things, such as reading the newspaper or watching television?     Moving or speaking so slowly that other people could have noticed? Or the opposite - being so fidgety or restless that you have been moving around a lot more than usual?     Thoughts that you would be better off dead, or of hurting yourself in some way?     PHQ-9 Total Score     If you checked off any problems, how difficult have  these problems made it for you to do your work, take care of things at home, or get along with other people?          Assessment/Plan:  Diagnoses and all orders for this visit:    1. MARGIE (generalized anxiety disorder) (Primary)  -     buPROPion XL (Wellbutrin XL) 150 MG 24 hr tablet; One po daily With 300mg  Dispense: 30 tablet; Refill: 2  -     diazePAM (Valium) 5 MG tablet; Take 1 tablet by mouth Daily As Needed for Anxiety.  Dispense: 10 tablet; Refill: 0    2. Bipolar II disorder (CMS/HCC)  -     lamoTRIgine (LaMICtal) 200 MG tablet; Take 1 tablet by mouth 2 (Two) Times a Day.  Dispense: 60 tablet; Refill: 2  -     Lurasidone HCl (Latuda) 80 MG tablet tablet; Take 1 tablet by mouth Daily. Take 80 mg orally daily with a meal.  Dispense: 30 tablet; Refill: 1    3. Post traumatic stress disorder (PTSD)      Switch Latuda with breakfast.  [facility] video appointment start time 11:30 AM.  End time 11:41 AM.    A psychological evaluation was conducted in order to assess past and current level of functioning. Areas assessed included, but were not limited to: perception of social support, perception of ability to face and deal with challenges in life (positive functioning), anxiety symptoms, depressive symptoms, perspective on beliefs/belief system, coping skills for stress, intelligence level,  Therapeutic rapport was established. Interventions conducted today were geared towards incorporating medication management along with support for continued therapy. Education was also provided as to the med management with this provider and what to expect in subsequent sessions.    We discussed risks, benefits,goals and side effects of the above medication and the patient was agreeable with the plan.Patient was educated on the importance of compliance with treatment and follow-up appointments. Patient is aware to contact the [facility] with any worsening of symptoms. To call for questions or concerns and return early if  necessary. Patent is agreeable to go to the Emergency Department or call 911 should they begin SI/HI.     Treatment Plan:   Discussed risks, benefits, and alternatives of medication. Encouraged healthy habits (eating, exercise and sleep). Call if any questions or problems arise. Medication reconciled. Controlled substance monitoring report reviewed. Provided psychoeducation.. Discussed coping strategies and current stressors. Set appropriate boundaries and limits for patient's well-being. Use distraction techniques to improve symptoms. Access support networks.      Return in about 4 weeks (around 7/13/2021) for Video visit.    Minoo Harrington, FRANCK

## 2021-08-03 ENCOUNTER — TELEMEDICINE (OUTPATIENT)
Dept: NEUROLOGY | Facility: CLINIC | Age: 36
End: 2021-08-03

## 2021-08-03 DIAGNOSIS — G43.719 INTRACTABLE CHRONIC MIGRAINE WITHOUT AURA AND WITHOUT STATUS MIGRAINOSUS: Primary | ICD-10-CM

## 2021-08-03 PROCEDURE — 99421 OL DIG E/M SVC 5-10 MIN: CPT | Performed by: NURSE PRACTITIONER

## 2021-08-03 RX ORDER — RIMEGEPANT SULFATE 75 MG/75MG
75 TABLET, ORALLY DISINTEGRATING ORAL EVERY OTHER DAY
Qty: 16 TABLET | Refills: 3 | Status: SHIPPED | OUTPATIENT
Start: 2021-08-03 | End: 2021-08-04

## 2021-08-03 NOTE — PROGRESS NOTES
Follow Up Neurology Office Visit      Patient Name: Vicky Lowery    Referring Physician: No ref. provider found    Chief Complaint:  No chief complaint on file.      History of Present Illness: Vicky Lowery is a 36 y.o. female who is here to follow up with Neurology for migraine headaches. Reports that her migraine headaches 'come in waves', may have several in one week then no headaches the next week. Taking lamotrigine and propranolol daily, using Ubrelvy prn with good effect. Estimates 10 MHD/month.     The following portions of the patient's history were reviewed and updated as appropriate: allergies, current medications, past family history, past medical history, past social history, past surgical history and problem list.    Subjective     Review of Systems:   Review of Systems   Respiratory: Negative.    Cardiovascular: Negative.    Gastrointestinal: Negative.    Endocrine: Negative.    Genitourinary: Positive for menstrual problem.   Allergic/Immunologic: Negative.    Neurological: Positive for headache.   Hematological: Negative.    Psychiatric/Behavioral: Positive for depressed mood. The patient is nervous/anxious.      Medications:     Current Outpatient Medications:   •  albuterol sulfate  (90 Base) MCG/ACT inhaler, Inhale 2 puffs See Admin Instructions. inhale 2 puffs by mouth every 4 to 6 hours as needed, Disp: , Rfl:   •  buPROPion XL (Wellbutrin XL) 150 MG 24 hr tablet, One po daily With 300mg, Disp: 30 tablet, Rfl: 2  •  diazePAM (Valium) 5 MG tablet, Take 1 tablet by mouth Daily As Needed for Anxiety., Disp: 10 tablet, Rfl: 0  •  hydroCHLOROthiazide (HYDRODIURIL) 25 MG tablet, Take 25 mg by mouth Daily., Disp: , Rfl:   •  hydroxychloroquine (PLAQUENIL) 200 MG tablet, Take  by mouth Daily., Disp: , Rfl:   •  lamoTRIgine (LaMICtal) 200 MG tablet, Take 1 tablet by mouth 2 (Two) Times a Day., Disp: 60 tablet, Rfl: 2  •  Lurasidone HCl (Latuda) 80 MG tablet tablet, Take 1  tablet by mouth Daily. Take 80 mg orally daily with a meal., Disp: 30 tablet, Rfl: 1  •  propranolol (INDERAL) 60 MG tablet, TAKE ONE TABLET BY MOUTH TWICE DAILY, Disp: 60 tablet, Rfl: 2  •  Rimegepant Sulfate (Nurtec) 75 MG tablet dispersible tablet, Place 1 tablet under the tongue Every Other Day for 1 dose., Disp: 16 tablet, Rfl: 3  •  tiZANidine (ZANAFLEX) 4 MG tablet, Take 1 tablet by mouth Every 8 (Eight) Hours As Needed for Muscle Spasms., Disp: 20 tablet, Rfl: 0  •  ubrogepant (ubrogepant) 100 MG tablet, Take 1 tablet by mouth 1 (One) Time As Needed (migraine) for up to 2 doses., Disp: 10 tablet, Rfl: 5  •  vitamin D (ERGOCALCIFEROL) 1.25 MG (00011 UT) capsule capsule, Take 50,000 Units by mouth 1 (One) Time Per Week., Disp: , Rfl:     Allergies:   Allergies   Allergen Reactions   • Triptans Swelling     Felt like throat was closing up   • Nsaids Other (See Comments)     Developed renal insufficiency after long term use       Objective     Physical Exam:  Vital Signs: There were no vitals filed for this visit.    Physical Exam  Neurological:      Mental Status: She is oriented to person, place, and time. Mental status is at baseline.   Psychiatric:         Mood and Affect: Mood normal.         Speech: Speech normal.         Behavior: Behavior normal.       Neurologic Exam     Mental Status   Oriented to person, place, and time.   Attention: normal. Concentration: normal.   Speech: speech is normal   Level of consciousness: alert  Normal comprehension.     Results Review:   I have reviewed the patient's other medical records to include, labs, radiology and referrals.     Assessment / Plan      Assessment/Plan:   Diagnoses and all orders for this visit:    1. Intractable chronic migraine without aura and without status migrainosus (Primary)  -     Rimegepant Sulfate (Nurtec) 75 MG tablet dispersible tablet; Place 1 tablet under the tongue Every Other Day for 1 dose.  Dispense: 16 tablet; Refill: 3       Follow  Up:   Return in about 6 weeks (around 9/14/2021) for Next scheduled follow up.     FRANCK Peña  [facility]     Please note that portions of this note may have been completed with a voice recognition program. Efforts were made to edit the dictations, but occasionally words are mistranscribed.  This was an audio and video enabled telemedicine encounter lasting 7 minutes.

## 2021-08-24 ENCOUNTER — TELEPHONE (OUTPATIENT)
Dept: NEUROLOGY | Facility: CLINIC | Age: 36
End: 2021-08-24

## 2021-08-24 NOTE — TELEPHONE ENCOUNTER
CONTACTED PT AND INFORMED HER THAT DOMINIQUE WOULD SEE HER FOR HER  NEW COMPLIANT'S AND SHE NEEDED TO HAVE EYE DOCTOR EVALUATE FOR BLURRY VISION.    SHE HAS AN UPCOMING APPT W/DOMINIQUE AND WILL BE CONTACTING AN EYE DOCTOR.

## 2021-08-24 NOTE — TELEPHONE ENCOUNTER
Provider: DOMINIQUE Brooksler: LIDYA    Relationship to Patient: SELF    Reason for Call: LIDYA IS CALLING BECAUSE SHE IS GETTING PAIN ON THE RIGHT SIDE OF NECK. IT RADIATES FORWARD UP TO HER HEAD TO HER TEMPLE AND HER EYE.    ALSO  HER SHOULDER AND ELBOW SHE IS EXPERIENCING PAIN.    HER RIGHT EYE IS  GETTING BLURRY, AND EVERY DAY HER EYE HAS GOTTEN WORSE.    SHOULD SHE TALK TO DOMINIQUE OR CALL HER EYE DOCTOR?

## 2021-08-24 NOTE — TELEPHONE ENCOUNTER
See eye doctor for vision complaints. These are new complaints which need to be evaluated in office, can follow up with myself or Aria Moraes.

## 2021-08-31 ENCOUNTER — TELEMEDICINE (OUTPATIENT)
Dept: BEHAVIORAL HEALTH | Facility: CLINIC | Age: 36
End: 2021-08-31

## 2021-08-31 VITALS — HEIGHT: 65 IN | WEIGHT: 293 LBS | BODY MASS INDEX: 48.82 KG/M2

## 2021-08-31 DIAGNOSIS — F31.81 BIPOLAR II DISORDER (HCC): Primary | ICD-10-CM

## 2021-08-31 PROCEDURE — 90832 PSYTX W PT 30 MINUTES: CPT | Performed by: COUNSELOR

## 2021-09-08 NOTE — PROGRESS NOTES
Telehealth Video Therapy Visit      Date: 2021     Patient Name: Vicky Lowery  : 1985   Time In: 2:45  Time Out: 3:06    Disclosure: You have chosen to receive care through a video visit today. Do you consent to use the phone and/or a video visit for your behavioral health today? Yes     This provider is located at [facility], [address] [place] [address], [address] in [place], [place], using [employer].  The patient is seen remotely at their home via [employer], an encrypted service from a [facility] to home residence. The patient's condition being diagnosed/treated is appropriate for telemedecine.  The provider identified herself as well as her credentials.      The patient and/or patient's guardian consent to be seen remotely, and when consent is given they understand that the consent allows for patient identifiable information to be sent to a third party as needed.  They may refuse to be seen remotely at any time.  The electronic data is encrypted and password protected, and the patient has been advised of the potential risks to privacy notwithstanding such measures.      Chief Complaint:  No chief complaint on file.      History of Present Illness: Vicky Lowery is a 36 y.o. female that has agreed to be seen via a telehealth visit today. Vicky Lowery has stated that they are in a secure environment for this session. The provider is located at her home. The patient is seen remotely at her  home, using Epic Video Visit (HIPAA compliant). The patient expressed concern about her depression When the patient feels depressed her behaviors spike and she hits extreme highs and lows.Discussed ways to cope with her behavior spikes and her ability to be able to distract from her feelings. The patient is appropriate for a telemedicine visit. I identified myself [facility]  along with my credentials of [facility].@ can refuse to be seen remotely at  any time. The electronic data is encrypted and password protected, and the patient has been advised of the potential risks to privacy, not withstanding such measures.    Subjective      Review of Systems:   The following portions of the patient's history were reviewed and updated as appropriate: allergies, current medications, past family history, past medical history, past social history, past surgical history and problem list.    Review of Systems   Skin: Negative for color change, rash and bruise.   Neurological: Negative for light-headedness and headache.   Psychiatric/Behavioral: Positive for self-injury, depressed mood and stress.       Past Medical History:   Past Medical History:   Diagnosis Date   • Anxiety    • Anxiety    • Asthma    • Back pain    • Bipolar disorder (CMS/HCC)    • Depression    • Kidney disease, chronic, stage II (mild, EGFR 60+ ml/min)    • Migraine    • Mitral valve prolapse    • Tattoo        Past Surgical History:   Past Surgical History:   Procedure Laterality Date   • FIBULA FRACTURE SURGERY  2014, 2015   • MANDIBLE FRACTURE SURGERY  2002   • WISDOM TOOTH EXTRACTION         Family History:   Family History   Problem Relation Age of Onset   • Breast cancer Mother    • Arthritis Mother    • Diabetes Maternal Grandfather    • Hypertension Father    • Migraines Father    • Arthritis Maternal Grandmother    • Colon cancer Neg Hx        Social History:   Social History     Socioeconomic History   • Marital status:      Spouse name: Not on file   • Number of children: Not on file   • Years of education: Not on file   • Highest education level: Not on file   Tobacco Use   • Smoking status: Current Some Day Smoker   • Smokeless tobacco: Never Used   Vaping Use   • Vaping Use: Never used   Substance and Sexual Activity   • Alcohol use: Yes     Alcohol/week: 1.0 standard drinks     Types: 1 Glasses of wine per week     Comment: social   • Drug use: No   • Sexual activity: Yes      "Partners: Male     Birth control/protection: None       Medications:     Current Outpatient Medications:   •  albuterol sulfate  (90 Base) MCG/ACT inhaler, Inhale 2 puffs See Admin Instructions. inhale 2 puffs by mouth every 4 to 6 hours as needed, Disp: , Rfl:   •  buPROPion XL (Wellbutrin XL) 150 MG 24 hr tablet, One po daily With 300mg, Disp: 30 tablet, Rfl: 2  •  diazePAM (Valium) 5 MG tablet, Take 1 tablet by mouth Daily As Needed for Anxiety., Disp: 10 tablet, Rfl: 0  •  hydroCHLOROthiazide (HYDRODIURIL) 25 MG tablet, Take 25 mg by mouth Daily., Disp: , Rfl:   •  hydroxychloroquine (PLAQUENIL) 200 MG tablet, Take  by mouth Daily., Disp: , Rfl:   •  lamoTRIgine (LaMICtal) 200 MG tablet, Take 1 tablet by mouth 2 (Two) Times a Day., Disp: 60 tablet, Rfl: 2  •  Lurasidone HCl (Latuda) 80 MG tablet tablet, Take 1 tablet by mouth Daily. Take 80 mg orally daily with a meal., Disp: 30 tablet, Rfl: 1  •  propranolol (INDERAL) 60 MG tablet, TAKE ONE TABLET BY MOUTH TWICE DAILY, Disp: 60 tablet, Rfl: 2  •  tiZANidine (ZANAFLEX) 4 MG tablet, Take 1 tablet by mouth Every 8 (Eight) Hours As Needed for Muscle Spasms., Disp: 20 tablet, Rfl: 0  •  ubrogepant (ubrogepant) 100 MG tablet, Take 1 tablet by mouth 1 (One) Time As Needed (migraine) for up to 2 doses., Disp: 10 tablet, Rfl: 5  •  vitamin D (ERGOCALCIFEROL) 1.25 MG (08663 UT) capsule capsule, Take 50,000 Units by mouth 1 (One) Time Per Week., Disp: , Rfl:     Allergies:   Allergies   Allergen Reactions   • Triptans Swelling     Felt like throat was closing up   • Nsaids Other (See Comments)     Developed renal insufficiency after long term use       PHQ-9 Score:   PHQ-9 Total Score:       Objective     Physical Exam:   Height 165.1 cm (65\"), weight (!) 204 kg (450 lb), not currently breastfeeding. Body mass index is 74.88 kg/m².     Physical Exam  Constitutional:       General: She is awake.      Appearance: Normal appearance. She is well-developed, well-groomed " and normal weight.      Interventions: Face mask in place.      Comments: Face mask due to Covid   Musculoskeletal:         General: Normal range of motion.   Skin:     Findings: No acne.   Neurological:      General: No focal deficit present.      Mental Status: She is alert and oriented to person, place, and time.      Motor: No tremor.      Gait: Gait is intact.   Psychiatric:         Attention and Perception: Attention normal. She is attentive. She does not perceive auditory or visual hallucinations.         Mood and Affect: Mood and affect normal.         Speech: Speech normal.         Behavior: Behavior normal. Behavior is cooperative.         Thought Content: Thought content normal.         Cognition and Memory: Cognition and memory normal.         Judgment: Judgment normal.         Patient's Support Network Includes:  parents    Prognosis: Good with Ongoing Treatment     Mental Status Exam:   Hygiene:   good  Cooperation:  Cooperative  Eye Contact:  Fair  Psychomotor Behavior:  Appropriate  Affect:  Appropriate  Mood: normal  Hopelessness: Denies  Speech:  Normal  Thought Process:  Goal directed  Thought Content:  Normal  Suicidal:  None  Homicidal:  None  Hallucinations:  None  Delusion:  None  Memory:  Intact  Orientation:  Person, Place, Time and Situation  Reliability:  good  Insight:  Good  Judgement:  Good  Impulse Control:  Good  Physical/Medical Issues:  No      Assessment / Plan      Assessment/Plan:   Diagnoses and all orders for this visit:    1. Bipolar II disorder (CMS/[facility]) (Primary)         1. The therapist will continue to promote the therapeutic alliance, address the patients issues and concerns, and strengthen her self awareness and self esteem. Work with the patient on the development of positive coping skills like visualization, music, art, and positive self talk. Work with the patient on utilizing Cognitive Behavioral Therapy to explore her emotions.     2. TREATMENT PLAN/GOALS: Continue  supportive psychotherapy efforts and medications as indicated. Treatment and medication options discussed during today's visit. Patient ackowledged and verbally consented to continue with current treatment plan and was educated on the importance of compliance with treatment and follow-up appointments.     Counseled patient regarding multimodal approach with healthy nutrition, healthy sleep, regular physical activity, social activities, counseling, and medications.      Coping skills reviewed and encouraged positive framing of thoughts. No suicidal ideation or homicidal ideation at this time.      Assisted patient in processing above session content; acknowledged and normalized patient’s thoughts, feelings, and concerns.  Applied  positive coping skills and behavior management in session.    Allowed patient to freely discuss issues without interruption or judgment. Provided safe, confidential environment to facilitate the development of positive therapeutic relationship and encourage open, honest communication. Assisted patient in identifying risk factors which would indicate the need for higher level of care including thoughts to harm self or others and/or self-harming behavior and encouraged patient to contact this office, call 911, or present to the nearest emergency room should any of these events occur. Discussed crisis intervention services and means to access.     Follow Up:   No follow-ups on file.    TAMERA Bañuelos  This document has been electronically signed by TAMERA Bañuelos  September 8, 2021 14:45 EDT    Please note that portions of this note were completed with a voice recognition program. Efforts were made to edit dictation, but occasionally words are mistranscribed.

## 2021-09-15 ENCOUNTER — TELEMEDICINE (OUTPATIENT)
Dept: BEHAVIORAL HEALTH | Facility: CLINIC | Age: 36
End: 2021-09-15

## 2021-09-15 DIAGNOSIS — F33.0 MAJOR DEPRESSIVE DISORDER, RECURRENT, MILD (HCC): Primary | ICD-10-CM

## 2021-09-15 PROCEDURE — 90834 PSYTX W PT 45 MINUTES: CPT | Performed by: COUNSELOR

## 2021-09-21 ENCOUNTER — TELEMEDICINE (OUTPATIENT)
Dept: BEHAVIORAL HEALTH | Facility: CLINIC | Age: 36
End: 2021-09-21

## 2021-09-21 VITALS — BODY MASS INDEX: 48.82 KG/M2 | HEIGHT: 65 IN | WEIGHT: 293 LBS

## 2021-09-21 DIAGNOSIS — F43.10 POST TRAUMATIC STRESS DISORDER (PTSD): ICD-10-CM

## 2021-09-21 DIAGNOSIS — F31.81 BIPOLAR II DISORDER (HCC): Primary | ICD-10-CM

## 2021-09-21 DIAGNOSIS — F41.1 GAD (GENERALIZED ANXIETY DISORDER): ICD-10-CM

## 2021-09-21 PROCEDURE — 99213 OFFICE O/P EST LOW 20 MIN: CPT | Performed by: NURSE PRACTITIONER

## 2021-09-21 RX ORDER — BUPROPION HYDROCHLORIDE 300 MG/1
300 TABLET ORAL DAILY
Qty: 30 TABLET | Refills: 2 | Status: SHIPPED | OUTPATIENT
Start: 2021-09-21 | End: 2021-11-11 | Stop reason: SDUPTHER

## 2021-09-21 RX ORDER — LURASIDONE HYDROCHLORIDE 80 MG/1
80 TABLET, FILM COATED ORAL DAILY
Qty: 30 TABLET | Refills: 2 | Status: SHIPPED | OUTPATIENT
Start: 2021-09-21 | End: 2021-11-11 | Stop reason: DRUGHIGH

## 2021-09-21 RX ORDER — LAMOTRIGINE 200 MG/1
200 TABLET ORAL 2 TIMES DAILY
Qty: 60 TABLET | Refills: 2 | Status: SHIPPED | OUTPATIENT
Start: 2021-09-21 | End: 2021-11-11 | Stop reason: SDUPTHER

## 2021-09-21 RX ORDER — DIAZEPAM 5 MG/1
5 TABLET ORAL DAILY PRN
Qty: 10 TABLET | Refills: 0 | Status: SHIPPED | OUTPATIENT
Start: 2021-09-21 | End: 2022-01-18 | Stop reason: SDUPTHER

## 2021-09-21 RX ORDER — BUPROPION HYDROCHLORIDE 150 MG/1
TABLET ORAL
Qty: 30 TABLET | Refills: 2 | Status: SHIPPED | OUTPATIENT
Start: 2021-09-21 | End: 2021-11-11 | Stop reason: SDUPTHER

## 2021-09-23 ENCOUNTER — TELEMEDICINE (OUTPATIENT)
Dept: NEUROLOGY | Facility: CLINIC | Age: 36
End: 2021-09-23

## 2021-09-23 DIAGNOSIS — G43.719 INTRACTABLE CHRONIC MIGRAINE WITHOUT AURA AND WITHOUT STATUS MIGRAINOSUS: Primary | ICD-10-CM

## 2021-09-23 PROCEDURE — 99422 OL DIG E/M SVC 11-20 MIN: CPT | Performed by: NURSE PRACTITIONER

## 2021-09-23 RX ORDER — BUTALBITAL, ACETAMINOPHEN AND CAFFEINE 50; 325; 40 MG/1; MG/1; MG/1
1 TABLET ORAL EVERY 4 HOURS PRN
Qty: 20 TABLET | Refills: 0 | Status: SHIPPED | OUTPATIENT
Start: 2021-09-23 | End: 2022-03-25 | Stop reason: SDUPTHER

## 2021-09-23 NOTE — PROGRESS NOTES
Follow Up Neurology Office Visit      Patient Name: Vicky Lowery    Referring Physician: No ref. provider found    Chief Complaint:  No chief complaint on file.      History of Present Illness: Vicky Lowery is a 36 y.o. female who is here to follow up with Neurology for  Migraine headaches. Has been using Nurtec QOD with excellent reduction of MHD, now estimates 3 MHD in previous month. She does note that migraine attacks are less severe now, and has slightly shifted to more pronounced in back of head.      The following portions of the patient's history were reviewed and updated as appropriate: allergies, current medications, past family history, past medical history, past social history, past surgical history and problem list.    Subjective     Review of Systems:   Review of Systems   Respiratory: Negative.    Cardiovascular: Negative.    Endocrine: Negative.    Genitourinary: Positive for vaginal bleeding.   Musculoskeletal: Positive for back pain.   Allergic/Immunologic: Negative.    Neurological: Positive for headache.   Hematological: Negative.    Psychiatric/Behavioral: Positive for stress. The patient is nervous/anxious.      Medications:     Current Outpatient Medications:   •  albuterol sulfate  (90 Base) MCG/ACT inhaler, Inhale 2 puffs See Admin Instructions. inhale 2 puffs by mouth every 4 to 6 hours as needed, Disp: , Rfl:   •  buPROPion XL (Wellbutrin XL) 150 MG 24 hr tablet, One po daily With 300mg, Disp: 30 tablet, Rfl: 2  •  buPROPion XL (Wellbutrin XL) 300 MG 24 hr tablet, Take 1 tablet by mouth Daily., Disp: 30 tablet, Rfl: 2  •  butalbital-acetaminophen-caffeine (FIORICET, ESGIC) -40 MG per tablet, Take 1 tablet by mouth Every 4 (Four) Hours As Needed for Migraine., Disp: 20 tablet, Rfl: 0  •  diazePAM (Valium) 5 MG tablet, Take 1 tablet by mouth Daily As Needed for Anxiety., Disp: 10 tablet, Rfl: 0  •  hydroCHLOROthiazide (HYDRODIURIL) 25 MG tablet, Take 25  mg by mouth Daily., Disp: , Rfl:   •  hydroxychloroquine (PLAQUENIL) 200 MG tablet, Take  by mouth Daily., Disp: , Rfl:   •  lamoTRIgine (LaMICtal) 200 MG tablet, Take 1 tablet by mouth 2 (Two) Times a Day., Disp: 60 tablet, Rfl: 2  •  Lurasidone HCl (Latuda) 80 MG tablet tablet, Take 1 tablet by mouth Daily. Take 80 mg orally daily with a meal., Disp: 30 tablet, Rfl: 2  •  propranolol (INDERAL) 60 MG tablet, TAKE ONE TABLET BY MOUTH TWICE DAILY, Disp: 60 tablet, Rfl: 2  •  tiZANidine (ZANAFLEX) 4 MG tablet, Take 1 tablet by mouth Every 8 (Eight) Hours As Needed for Muscle Spasms., Disp: 20 tablet, Rfl: 0  •  ubrogepant (ubrogepant) 100 MG tablet, Take 1 tablet by mouth 1 (One) Time As Needed (migraine) for up to 2 doses., Disp: 10 tablet, Rfl: 5  •  vitamin D (ERGOCALCIFEROL) 1.25 MG (56471 UT) capsule capsule, Take 50,000 Units by mouth 1 (One) Time Per Week., Disp: , Rfl:     Allergies:   Allergies   Allergen Reactions   • Triptans Swelling     Felt like throat was closing up   • Nsaids Other (See Comments)     Developed renal insufficiency after long term use       Objective     Physical Exam: slightly limited due to MyChart video encounter  Vital Signs: There were no vitals filed for this visit.    Physical Exam  Constitutional:       Appearance: Normal appearance.   Pulmonary:      Effort: Pulmonary effort is normal.   Neurological:      General: No focal deficit present.      Mental Status: She is oriented to person, place, and time.      GCS: GCS eye subscore is 4. GCS verbal subscore is 5. GCS motor subscore is 6.      Cranial Nerves: Cranial nerves are intact.      Comments: HAZEL    Psychiatric:         Mood and Affect: Mood normal.         Behavior: Behavior normal.         Neurologic Exam     Mental Status   Oriented to person, place, and time.     Results Review:   I have reviewed the patient's other medical records to include, labs, radiology and referrals.     Assessment / Plan      Assessment/Plan:    Diagnoses and all orders for this visit:    1. Intractable chronic migraine without aura and without status migrainosus (Primary)  -     butalbital-acetaminophen-caffeine (FIORICET, ESGIC) -40 MG per tablet; Take 1 tablet by mouth Every 4 (Four) Hours As Needed for Migraine.  Dispense: 20 tablet; Refill: 0     Continue daily propranolol, lamotrigine, Nurtec QOD. Patient has experienced SOA with triptans in the past, we discussed using Fioricet sparingly for migraine attacks and patient agreed to try this today.   As part of this patient's treatment plan I am prescribing controlled substances. The patient has been made aware of appropriate use of such medications, including potential risk of somnolence, limited ability to drive and/or work safely, and potential for dependence or overdose. It has also been made clear that these medications are for use by the patient only, without concomitant use of alcohol or other substances unless prescribed. Keep out of reach of children.  Wes report has been reviewed.   Follow Up:   Return in about 4 months (around 1/23/2022) for Next scheduled follow up.       FRANCK Peña  [facility]       Please note that portions of this note may have been completed with a voice recognition program. Efforts were made to edit the dictations, but occasionally words are mistranscribed.

## 2021-09-23 NOTE — PROGRESS NOTES
"     Telehealth Video Therapy Visit      Date: 09/15/2021     Patient Name: Vicky Lowery  : 1985   Time In: 2:30  Time Out: 2:52    Disclosure: You have chosen to receive care through a video visit today. Do you consent to use the phone and/or a video visit for your behavioral health today? Yes     This provider is located at [facility]al [facility], [address] [place] [address], [address] in [place], [place], using [employer].  The patient is seen remotely at their home via [employer], an encrypted service from a [facility] to home residence. The patient's condition being diagnosed/treated is appropriate for telemedecine.  The provider identified herself as well as her credentials.      The patient and/or patient's guardian consent to be seen remotely, and when consent is given they understand that the consent allows for patient identifiable information to be sent to a third party as needed.  They may refuse to be seen remotely at any time.  The electronic data is encrypted and password protected, and the patient has been advised of the potential risks to privacy notwithstanding such measures.      Chief Complaint:  No chief complaint on file.      History of Present Illness: Vicky Lowery is a 36 y.o. female that has agreed to be seen via a telehealth visit today. Vicky Lowery has stated that they are in a secure environment for this session. The provider is located at her home. The patient is seen remotely at her  home, using Epic Video Visit (HIPAA compliant). The patient reports to the therapist that her depression has been good, but the patient gets overwhelmed a lot. The patient feels that she has a motivation problem. The patient states that things have been going pretty good, but her sleeping is \"all over the place\". The patient discussed she is having a difficult time remembering to take the Latuda. Discussed with the patient a way that her boyfriend can " hold her accountable for taking it. The patient is appropriate for a telemedicine visit. I identified myself Bridget Harley [facility]  along with my credentials of [facility].@ can refuse to be seen remotely at any time. The electronic data is encrypted and password protected, and the patient has been advised of the potential risks to privacy, not withstanding such measures.    Subjective      Review of Systems:   The following portions of the patient's history were reviewed and updated as appropriate: allergies, current medications, past family history, past medical history, past social history, past surgical history and problem list.    Review of Systems   Skin: Negative for color change, rash and bruise.   Neurological: Negative for light-headedness and headache.   Psychiatric/Behavioral: Positive for self-injury, depressed mood and stress.       Past Medical History:   Past Medical History:   Diagnosis Date   • Anxiety    • Anxiety    • Asthma    • Back pain    • Bipolar disorder (CMS/HCC)    • Depression    • Kidney disease, chronic, stage II (mild, EGFR 60+ ml/min)    • Migraine    • Mitral valve prolapse    • Tattoo        Past Surgical History:   Past Surgical History:   Procedure Laterality Date   • FIBULA FRACTURE SURGERY  2014, 2015   • MANDIBLE FRACTURE SURGERY  2002   • WISDOM TOOTH EXTRACTION         Family History:   Family History   Problem Relation Age of Onset   • Breast cancer Mother    • Arthritis Mother    • Diabetes Maternal Grandfather    • Hypertension Father    • Migraines Father    • Arthritis Maternal Grandmother    • Colon cancer Neg Hx        Social History:   Social History     Socioeconomic History   • Marital status:      Spouse name: Not on file   • Number of children: Not on file   • Years of education: Not on file   • Highest education level: Not on file   Tobacco Use   • Smoking status: Current Some Day Smoker   • Smokeless tobacco: Never Used   Vaping Use   • Vaping Use: Never used    Substance and Sexual Activity   • Alcohol use: Yes     Alcohol/week: 1.0 standard drinks     Types: 1 Glasses of wine per week     Comment: social   • Drug use: No   • Sexual activity: Yes     Partners: Male     Birth control/protection: None       Medications:     Current Outpatient Medications:   •  albuterol sulfate  (90 Base) MCG/ACT inhaler, Inhale 2 puffs See Admin Instructions. inhale 2 puffs by mouth every 4 to 6 hours as needed, Disp: , Rfl:   •  buPROPion XL (Wellbutrin XL) 150 MG 24 hr tablet, One po daily With 300mg, Disp: 30 tablet, Rfl: 2  •  buPROPion XL (Wellbutrin XL) 300 MG 24 hr tablet, Take 1 tablet by mouth Daily., Disp: 30 tablet, Rfl: 2  •  diazePAM (Valium) 5 MG tablet, Take 1 tablet by mouth Daily As Needed for Anxiety., Disp: 10 tablet, Rfl: 0  •  hydroCHLOROthiazide (HYDRODIURIL) 25 MG tablet, Take 25 mg by mouth Daily., Disp: , Rfl:   •  hydroxychloroquine (PLAQUENIL) 200 MG tablet, Take  by mouth Daily., Disp: , Rfl:   •  lamoTRIgine (LaMICtal) 200 MG tablet, Take 1 tablet by mouth 2 (Two) Times a Day., Disp: 60 tablet, Rfl: 2  •  Lurasidone HCl (Latuda) 80 MG tablet tablet, Take 1 tablet by mouth Daily. Take 80 mg orally daily with a meal., Disp: 30 tablet, Rfl: 2  •  propranolol (INDERAL) 60 MG tablet, TAKE ONE TABLET BY MOUTH TWICE DAILY, Disp: 60 tablet, Rfl: 2  •  tiZANidine (ZANAFLEX) 4 MG tablet, Take 1 tablet by mouth Every 8 (Eight) Hours As Needed for Muscle Spasms., Disp: 20 tablet, Rfl: 0  •  ubrogepant (ubrogepant) 100 MG tablet, Take 1 tablet by mouth 1 (One) Time As Needed (migraine) for up to 2 doses., Disp: 10 tablet, Rfl: 5  •  vitamin D (ERGOCALCIFEROL) 1.25 MG (38433 UT) capsule capsule, Take 50,000 Units by mouth 1 (One) Time Per Week., Disp: , Rfl:     Allergies:   Allergies   Allergen Reactions   • Triptans Swelling     Felt like throat was closing up   • Nsaids Other (See Comments)     Developed renal insufficiency after long term use       PHQ-9 Score:    PHQ-9 Total Score:       Objective     Physical Exam:   not currently breastfeeding. There is no height or weight on file to calculate BMI.     Physical Exam  Constitutional:       General: She is awake.      Appearance: Normal appearance. She is well-developed, well-groomed and normal weight.      Interventions: Face mask in place.      Comments: Face mask due to Covid   Musculoskeletal:         General: Normal range of motion.   Skin:     Findings: No acne.   Neurological:      General: No focal deficit present.      Mental Status: She is alert and oriented to person, place, and time.      Motor: No tremor.      Gait: Gait is intact.   Psychiatric:         Attention and Perception: Attention normal. She is attentive. She does not perceive auditory or visual hallucinations.         Mood and Affect: Mood and affect normal.         Speech: Speech normal.         Behavior: Behavior normal. Behavior is cooperative.         Thought Content: Thought content normal.         Cognition and Memory: Cognition and memory normal.         Judgment: Judgment normal.         Patient's Support Network Includes:  parents, and boyfriend    Prognosis: Good with Ongoing Treatment     Mental Status Exam:   Hygiene:   good  Cooperation:  Cooperative  Eye Contact:  Fair  Psychomotor Behavior:  Appropriate  Affect:  Appropriate  Mood: normal  Hopelessness: Denies  Speech:  Normal  Thought Process:  Goal directed  Thought Content:  Normal  Suicidal:  None  Homicidal:  None  Hallucinations:  None  Delusion:  None  Memory:  Intact  Orientation:  Person, Place, Time and Situation  Reliability:  good  Insight:  Good  Judgement:  Good  Impulse Control:  Good  Physical/Medical Issues:  No      Assessment / Plan      Assessment/Plan:   Diagnoses and all orders for this visit:    1. Major depressive disorder, recurrent, mild (HCC) (Primary)         1. The therapist will continue to promote the therapeutic alliance, address the patients issues and  concerns, and strengthen her self awareness and self esteem. Work with the patient on the development of positive coping skills like visualization, music, art, and positive self talk. Work with the patient on utilizing Cognitive Behavioral Therapy to explore her emotions.     2. TREATMENT PLAN/GOALS: Continue supportive psychotherapy efforts and medications as indicated. Treatment and medication options discussed during today's visit. Patient ackowledged and verbally consented to continue with current treatment plan and was educated on the importance of compliance with treatment and follow-up appointments.     Counseled patient regarding multimodal approach with healthy nutrition, healthy sleep, regular physical activity, social activities, counseling, and medications.      Coping skills reviewed and encouraged positive framing of thoughts. No suicidal ideation or homicidal ideation at this time.      Assisted patient in processing above session content; acknowledged and normalized patient’s thoughts, feelings, and concerns.  Applied  positive coping skills and behavior management in session.    Allowed patient to freely discuss issues without interruption or judgment. Provided safe, confidential environment to facilitate the development of positive therapeutic relationship and encourage open, honest communication. Assisted patient in identifying risk factors which would indicate the need for higher level of care including thoughts to harm self or others and/or self-harming behavior and encouraged patient to contact this office, call 911, or present to the nearest emergency room should any of these events occur. Discussed crisis intervention services and means to access.     Follow Up:   No follow-ups on file.    TAMERA Bañuelos  This document has been electronically signed by TAMERA Bañuelos  September 23, 2021 09:08 EDT    Please note that portions of this note were completed with a voice recognition  program. Efforts were made to edit dictation, but occasionally words are mistranscribed.

## 2021-09-29 ENCOUNTER — TELEMEDICINE (OUTPATIENT)
Dept: BEHAVIORAL HEALTH | Facility: CLINIC | Age: 36
End: 2021-09-29

## 2021-09-29 DIAGNOSIS — F41.1 GAD (GENERALIZED ANXIETY DISORDER): Primary | ICD-10-CM

## 2021-09-29 DIAGNOSIS — F33.0 MAJOR DEPRESSIVE DISORDER, RECURRENT, MILD (HCC): ICD-10-CM

## 2021-09-29 PROCEDURE — 90832 PSYTX W PT 30 MINUTES: CPT | Performed by: COUNSELOR

## 2021-11-02 ENCOUNTER — TELEPHONE (OUTPATIENT)
Dept: NEUROLOGY | Facility: CLINIC | Age: 36
End: 2021-11-02

## 2021-11-02 NOTE — TELEPHONE ENCOUNTER
Caller: Vicky Lowery    Relationship: Self    Best call back number: [phone]    What medications are you currently taking:   Current Outpatient Medications on File Prior to Visit   Medication Sig Dispense Refill   • albuterol sulfate  (90 Base) MCG/ACT inhaler Inhale 2 puffs See Admin Instructions. inhale 2 puffs by mouth every 4 to 6 hours as needed     • buPROPion XL (Wellbutrin XL) 150 MG 24 hr tablet One po daily With 300mg 30 tablet 2   • buPROPion XL (Wellbutrin XL) 300 MG 24 hr tablet Take 1 tablet by mouth Daily. 30 tablet 2   • butalbital-acetaminophen-caffeine (FIORICET, ESGIC) -40 MG per tablet Take 1 tablet by mouth Every 4 (Four) Hours As Needed for Migraine. 20 tablet 0   • diazePAM (Valium) 5 MG tablet Take 1 tablet by mouth Daily As Needed for Anxiety. 10 tablet 0   • hydroCHLOROthiazide (HYDRODIURIL) 25 MG tablet Take 25 mg by mouth Daily.     • hydroxychloroquine (PLAQUENIL) 200 MG tablet Take  by mouth Daily.     • lamoTRIgine (LaMICtal) 200 MG tablet Take 1 tablet by mouth 2 (Two) Times a Day. 60 tablet 2   • Lurasidone HCl (Latuda) 80 MG tablet tablet Take 1 tablet by mouth Daily. Take 80 mg orally daily with a meal. 30 tablet 2   • propranolol (INDERAL) 60 MG tablet TAKE ONE TABLET BY MOUTH TWICE DAILY 60 tablet 2   • tiZANidine (ZANAFLEX) 4 MG tablet Take 1 tablet by mouth Every 8 (Eight) Hours As Needed for Muscle Spasms. 20 tablet 0   • ubrogepant (ubrogepant) 100 MG tablet Take 1 tablet by mouth 1 (One) Time As Needed (migraine) for up to 2 doses. 10 tablet 5   • vitamin D (ERGOCALCIFEROL) 1.25 MG (41579 UT) capsule capsule Take 50,000 Units by mouth 1 (One) Time Per Week.       No current facility-administered medications on file prior to visit.      Which medication are you concerned about: NURTEC AND UBRELVY    Who prescribed you this medication:   STARR COOPER    What are your concerns:   PT'S INSURANCE CHANGED IN OCT AND WILL NO LONGER COVER EITHER OF THESE  RXs. PT IS CALLING TO SEE IF THERE IS SOMETHING ELSE SHE CAN BE PRESCRIBED OR IF SHE CAN GET SOME SAMPLES.    PT ALSO STATED THAT [facility] TOLD HER THAT THEY WERE FAXING A REQUEST FOR HER LIST OF MEDICATIONS SO THEY COULD START DOING MAIL ORDER FOR HER.    PT'S PHARM IS [facility]     PLEASE REVIEW AND ADVISE.

## 2021-11-04 NOTE — TELEPHONE ENCOUNTER
If she is still taking the Nurtec every other day, she may be able to apply for patient assistance. I'd contact Pinky Munoz

## 2021-11-05 NOTE — TELEPHONE ENCOUNTER
Called and spoke with patient. Let her know I was going to reach out to our drug rep to check on a patient assistance program and I would get back with her.

## 2021-11-11 ENCOUNTER — TELEMEDICINE (OUTPATIENT)
Dept: BEHAVIORAL HEALTH | Facility: CLINIC | Age: 36
End: 2021-11-11

## 2021-11-11 DIAGNOSIS — F41.1 GAD (GENERALIZED ANXIETY DISORDER): Primary | ICD-10-CM

## 2021-11-11 DIAGNOSIS — F43.10 POST TRAUMATIC STRESS DISORDER (PTSD): ICD-10-CM

## 2021-11-11 DIAGNOSIS — F31.81 BIPOLAR II DISORDER (HCC): ICD-10-CM

## 2021-11-11 PROCEDURE — 99213 OFFICE O/P EST LOW 20 MIN: CPT | Performed by: NURSE PRACTITIONER

## 2021-11-11 RX ORDER — BUPROPION HYDROCHLORIDE 150 MG/1
TABLET ORAL
Qty: 30 TABLET | Refills: 2 | Status: SHIPPED | OUTPATIENT
Start: 2021-11-11 | End: 2022-01-18 | Stop reason: SDUPTHER

## 2021-11-11 RX ORDER — BUPROPION HYDROCHLORIDE 300 MG/1
300 TABLET ORAL DAILY
Qty: 30 TABLET | Refills: 2 | Status: SHIPPED | OUTPATIENT
Start: 2021-11-11 | End: 2022-01-18 | Stop reason: SDUPTHER

## 2021-11-11 RX ORDER — LAMOTRIGINE 200 MG/1
200 TABLET ORAL 2 TIMES DAILY
Qty: 60 TABLET | Refills: 2 | Status: SHIPPED | OUTPATIENT
Start: 2021-11-11 | End: 2022-01-18 | Stop reason: SDUPTHER

## 2021-11-11 RX ORDER — LURASIDONE HYDROCHLORIDE 120 MG/1
120 TABLET, FILM COATED ORAL DAILY
Qty: 30 TABLET | Refills: 6 | Status: SHIPPED | OUTPATIENT
Start: 2021-11-11 | End: 2022-11-22

## 2021-11-11 NOTE — PROGRESS NOTES
Patient Name: Vicky Lowery  MRN: [id number]   :  1985     Chief Complaint:      ICD-10-CM ICD-9-CM   1. MARGIE (generalized anxiety disorder)  F41.1 300.02   2. Bipolar II disorder (HCC)  F31.81 296.89   3. Post traumatic stress disorder (PTSD)  F43.10 309.81       History of Present Illness: Vicky Lowery is a 36 y.o. female is here today for medication management follow up.  Patient states she is feeling about the same.  Has had a very rough week with friendships and feeling that they just want her for what she can give to them.  States she has been arguing with her family in is not looking forward to Thanksgiving.  States she has low motivation and does not have the energy to want to get out.  The following portions of the patient's history were reviewed and updated as appropriate: allergies, current medications, past family history, past medical history, past social history, past surgical history and problem list.    Review of Systems;;  Review of Systems   Constitutional: Negative for activity change, appetite change, fatigue, unexpected weight gain and unexpected weight loss.   Respiratory: Negative for shortness of breath and wheezing.    Gastrointestinal: Negative for constipation, diarrhea, nausea and vomiting.   Musculoskeletal: Negative for gait problem.   Skin: Negative for dry skin and rash.   Neurological: Negative for dizziness, speech difficulty, weakness, light-headedness, headache, memory problem and confusion.   Psychiatric/Behavioral: Positive for sleep disturbance, depressed mood and stress. Negative for agitation, behavioral problems, decreased concentration, dysphoric mood, hallucinations, self-injury, suicidal ideas and negative for hyperactivity. The patient is nervous/anxious.        Physical Exam;;  Physical Exam  Vitals and nursing note reviewed.   Constitutional:       General: She is not in acute distress.     Appearance: She is well-developed. She is not  diaphoretic.   HENT:      Head: Normocephalic and atraumatic.   Eyes:      Conjunctiva/sclera: Conjunctivae normal.   Cardiovascular:      Rate and Rhythm: Normal rate.   Pulmonary:      Effort: Pulmonary effort is normal. No respiratory distress.   Musculoskeletal:         General: Normal range of motion.      Cervical back: Full passive range of motion without pain and normal range of motion.   Skin:     General: Skin is warm and dry.   Neurological:      Mental Status: She is alert and oriented to person, place, and time.   Psychiatric:         Mood and Affect: Mood is anxious and depressed. Affect is not labile, blunt, angry or inappropriate.         Speech: Speech is not rapid and pressured or tangential.         Behavior: Behavior normal. Behavior is not agitated, slowed, aggressive, withdrawn, hyperactive or combative. Behavior is cooperative.         Thought Content: Thought content normal. Thought content is not paranoid or delusional. Thought content does not include homicidal or suicidal ideation. Thought content does not include homicidal or suicidal plan.         Judgment: Judgment normal.       not currently breastfeeding.  There is no height or weight on file to calculate BMI.    Current Medications;;    Current Outpatient Medications:   •  albuterol sulfate  (90 Base) MCG/ACT inhaler, Inhale 2 puffs See Admin Instructions. inhale 2 puffs by mouth every 4 to 6 hours as needed, Disp: , Rfl:   •  buPROPion XL (Wellbutrin XL) 150 MG 24 hr tablet, One po daily With 300mg, Disp: 30 tablet, Rfl: 2  •  buPROPion XL (Wellbutrin XL) 300 MG 24 hr tablet, Take 1 tablet by mouth Daily., Disp: 30 tablet, Rfl: 2  •  butalbital-acetaminophen-caffeine (FIORICET, ESGIC) -40 MG per tablet, Take 1 tablet by mouth Every 4 (Four) Hours As Needed for Migraine., Disp: 20 tablet, Rfl: 0  •  diazePAM (Valium) 5 MG tablet, Take 1 tablet by mouth Daily As Needed for Anxiety., Disp: 10 tablet, Rfl: 0  •   hydroCHLOROthiazide (HYDRODIURIL) 25 MG tablet, Take 25 mg by mouth Daily., Disp: , Rfl:   •  hydroxychloroquine (PLAQUENIL) 200 MG tablet, Take  by mouth Daily., Disp: , Rfl:   •  lamoTRIgine (LaMICtal) 200 MG tablet, Take 1 tablet by mouth 2 (Two) Times a Day., Disp: 60 tablet, Rfl: 2  •  propranolol (INDERAL) 60 MG tablet, TAKE ONE TABLET BY MOUTH TWICE DAILY, Disp: 60 tablet, Rfl: 2  •  tiZANidine (ZANAFLEX) 4 MG tablet, Take 1 tablet by mouth Every 8 (Eight) Hours As Needed for Muscle Spasms., Disp: 20 tablet, Rfl: 0  •  ubrogepant (ubrogepant) 100 MG tablet, Take 1 tablet by mouth 1 (One) Time As Needed (migraine) for up to 2 doses., Disp: 10 tablet, Rfl: 5  •  vitamin D (ERGOCALCIFEROL) 1.25 MG (34824 UT) capsule capsule, Take 50,000 Units by mouth 1 (One) Time Per Week., Disp: , Rfl:   •  Lurasidone HCl (Latuda) 120 MG tablet tablet, Take 1 tablet by mouth Daily. Take 120 mg orally daily with a meal., Disp: 30 tablet, Rfl: 6    Lab Results:   No visits with results within 3 Month(s) from this visit.   Latest known visit with results is:   Orders Only on 01/21/2021   Component Date Value Ref Range Status   • Amphetamine, Urine Qual 01/21/2021 Negative  Sgeeba=2794 ng/mL Final   • Barbiturates Screen, Urine 01/21/2021 Negative  Gbteoh=545 ng/mL Final   • Benzodiazepine Screen, Urine 01/21/2021 Positive* Ymqjol=158 ng/mL Final   • THC Screen, Urine 01/21/2021 Positive* Cutoff=20 ng/mL Final   • Cocaine Screen, Urine 01/21/2021 Negative  Kekbtj=307 ng/mL Final   • Opiate Screen, Urine 01/21/2021 Negative  Irsiqr=688 ng/mL Final    Opiate test includes Codeine, Morphine, Hydromorphone, Hydrocodone.   • Oxycodone/Oxymorphone, Urine 01/21/2021 Negative  Yoqreo=437 ng/mL Final    Test includes Oxycodone and Oxymorphone   • Phencyclidine (PCP), Urine 01/21/2021 Negative  Cutoff=25 ng/mL Final   • Methadone Screen, Urine 01/21/2021 Negative  Irjtqx=979 ng/mL Final   • Propoxyphene Screen 01/21/2021 Negative   Gvpcyp=125 ng/mL Final   • Creatinine, Urine 01/21/2021 131.7  20.0 - 300.0 mg/dL Final   • pH, UA 01/21/2021 8.4  4.5 - 8.9 Final   • Please note 01/21/2021 Comment   Final    Comment: This assay provides a preliminary unconfirmed analytical test  result that may be suitable for clinical management of patients  in certain situations. Drug-test results should be interpreted  in the context of clinical information. Patient metabolic  variables, specific drug chemistry, and specimen  characteristics can affect test outcome. Technical consultation  is available if a test result is inconsistent with an expected  outcome. ([email or web address] call toll-free  [phone])         Mental Status Exam:   Hygiene:   good  Cooperation:  Cooperative  Eye Contact:  Good  Psychomotor Behavior:  Appropriate  Mood:anxious and depressed  Affect:  Appropriate  Hopelessness: Denies  Speech:  Normal  Thought Process:  Goal directed  Thought Content:  Normal  Suicidal:  None  Homicidal:  None  Hallucinations:  None  Delusion:  None  Memory:  Intact  Orientation:  Person, Place, Time and Situation  Reliability:  good  Insight:  Good  Judgement:  Good  Impulse Control:  Good  Physical/Medical Issues:  No     PHQ-9 Depression Screening  Little interest or pleasure in doing things? 2   Feeling down, depressed, or hopeless? 2   Trouble falling or staying asleep, or sleeping too much? 1   Feeling tired or having little energy? 3   Poor appetite or overeating? 0   Feeling bad about yourself - or that you are a failure or have let yourself or your family down? 1   Trouble concentrating on things, such as reading the newspaper or watching television? 1   Moving or speaking so slowly that other people could have noticed? Or the opposite - being so fidgety or restless that you have been moving around a lot more than usual? 0   Thoughts that you would be better off dead, or of hurting yourself in some way? 1   PHQ-9 Total Score 11    If you checked off any problems, how difficult have these problems made it for you to do your work, take care of things at home, or get along with other people? Very difficult        Assessment/Plan:  Diagnoses and all orders for this visit:    1. MARGIE (generalized anxiety disorder) (Primary)  -     buPROPion XL (Wellbutrin XL) 150 MG 24 hr tablet; One po daily With 300mg  Dispense: 30 tablet; Refill: 2  -     buPROPion XL (Wellbutrin XL) 300 MG 24 hr tablet; Take 1 tablet by mouth Daily.  Dispense: 30 tablet; Refill: 2    2. Bipolar II disorder (HCC)  -     lamoTRIgine (LaMICtal) 200 MG tablet; Take 1 tablet by mouth 2 (Two) Times a Day.  Dispense: 60 tablet; Refill: 2  -     Lurasidone HCl (Latuda) 120 MG tablet tablet; Take 1 tablet by mouth Daily. Take 120 mg orally daily with a meal.  Dispense: 30 tablet; Refill: 6    3. Post traumatic stress disorder (PTSD)      Patient continues to be extremely depressed.  We will increase Latuda to 120 mg daily with a meal.  If this is not successful we may consider a small dose of stimulant for treatment resistant depression.  [employer] video appointment start time 1 PM.  End time 1:11 PM.  Psychological evaluation was conducted in order to assess past and current level of functioning. Areas assessed included, but were not limited to: perception of social support, perception of ability to face and deal with challenges in life (positive functioning), anxiety symptoms, depressive symptoms, perspective on beliefs/belief system, coping skills for stress, intelligence level,  Therapeutic rapport was established. Interventions conducted today were geared towards incorporating medication management along with support for continued therapy. Education was also provided as to the med management with this provider and what to expect in subsequent sessions.    We discussed risks, benefits,goals and side effects of the above medication and the patient was agreeable with the plan.Patient  was educated on the importance of compliance with treatment and follow-up appointments. Patient is aware to contact the [facility] with any worsening of symptoms. To call for questions or concerns and return early if necessary. Patent is agreeable to go to the Emergency Department or call 911 should they begin SI/HI.     Treatment Plan:   Discussed risks, benefits, and alternatives of medication. Encouraged healthy habits (eating, exercise and sleep). Call if any questions or problems arise. Medication reconciled. Controlled substance monitoring report reviewed. Provided psychoeducation.. Discussed coping strategies and current stressors. Set appropriate boundaries and limits for patient's well-being. Use distraction techniques to improve symptoms. Access support networks.      Return in about 4 weeks (around 12/9/2021) for Video visit.    Minoo Harrington, FRANCK

## 2021-11-16 ENCOUNTER — TELEMEDICINE (OUTPATIENT)
Dept: BEHAVIORAL HEALTH | Facility: CLINIC | Age: 36
End: 2021-11-16

## 2021-11-16 DIAGNOSIS — F31.81 BIPOLAR II DISORDER (HCC): Primary | ICD-10-CM

## 2021-11-16 PROCEDURE — 90832 PSYTX W PT 30 MINUTES: CPT | Performed by: COUNSELOR

## 2021-12-16 ENCOUNTER — PRIOR AUTHORIZATION (OUTPATIENT)
Dept: BEHAVIORAL HEALTH | Facility: CLINIC | Age: 36
End: 2021-12-16

## 2021-12-28 NOTE — PROGRESS NOTES
Telehealth Video Therapy Visit      Date: 2021     Patient Name: Vicky Lowery  : 1985   Time In: 11:25  Time Out: 11:59     Disclosure: You have chosen to receive care through a video visit today. Do you consent to use the phone and/or a video visit for your behavioral health today? Yes     This provider is located at [facility], [address] [place] [address], [address] in [place], [place], using [employer].  The patient is seen remotely at their home via [employer], an encrypted service from a [facility] to home residence. The patient's condition being diagnosed/treated is appropriate for telemedecine.  The provider identified herself as well as her credentials.      The patient and/or patient's guardian consent to be seen remotely, and when consent is given they understand that the consent allows for patient identifiable information to be sent to a third party as needed.  They may refuse to be seen remotely at any time.  The electronic data is encrypted and password protected, and the patient has been advised of the potential risks to privacy notwithstanding such measures.      Chief Complaint:  No chief complaint on file.      History of Present Illness: Vicky Lowery is a 36 y.o. female that has agreed to be seen via a telehealth visit today. Vicky Lowery has stated that they are in a secure environment for this session. The provider is located at her home.  The patient is seen remotely at her  home, using Epic Video Visit (HIPAA compliant).  The patient discussed that she has had a lot of discord with her family and her boyfriend.  The patient states that she has not been getting along with her father because she has been talking politics with her father, and her boyfriend she has not been getting along with him because he has not been communicating with her and it makes her withdraw from everything.  The patient has only journaled a  little bit as a coping skill and she expressed that she does not feel the Latuda is working for her.  The patient is appropriate for a telemedicine visit. I identified myself Bridget Harley, [facility]  along with my credentials of [facility].@ can refuse to be seen remotely at any time. The electronic data is encrypted and password protected, and the patient has been advised of the potential risks to privacy, not withstanding such measures.    Subjective      Review of Systems:   The following portions of the patient's history were reviewed and updated as appropriate: allergies, current medications, past family history, past medical history, past social history, past surgical history and problem list.    Review of Systems   Skin: Negative for color change, rash and bruise.   Neurological: Negative for light-headedness and headache.   Psychiatric/Behavioral: Positive for self-injury, depressed mood and stress.       Past Medical History:   Past Medical History:   Diagnosis Date   • Anxiety    • Anxiety    • Asthma    • Back pain    • Bipolar disorder (HCC)    • Depression    • Kidney disease, chronic, stage II (mild, EGFR 60+ ml/min)    • Migraine    • Mitral valve prolapse    • Tattoo        Past Surgical History:   Past Surgical History:   Procedure Laterality Date   • FIBULA FRACTURE SURGERY  2014, 2015   • MANDIBLE FRACTURE SURGERY  2002   • WISDOM TOOTH EXTRACTION         Family History:   Family History   Problem Relation Age of Onset   • Breast cancer Mother    • Arthritis Mother    • Diabetes Maternal Grandfather    • Hypertension Father    • Migraines Father    • Arthritis Maternal Grandmother    • Colon cancer Neg Hx        Social History:   Social History     Socioeconomic History   • Marital status:    Tobacco Use   • Smoking status: Current Some Day Smoker   • Smokeless tobacco: Never Used   Vaping Use   • Vaping Use: Never used   Substance and Sexual Activity   • Alcohol use: Yes     Alcohol/week: 1.0  standard drink     Types: 1 Glasses of wine per week     Comment: social   • Drug use: No   • Sexual activity: Yes     Partners: Male     Birth control/protection: None       Medications:     Current Outpatient Medications:   •  albuterol sulfate  (90 Base) MCG/ACT inhaler, Inhale 2 puffs See Admin Instructions. inhale 2 puffs by mouth every 4 to 6 hours as needed, Disp: , Rfl:   •  buPROPion XL (Wellbutrin XL) 150 MG 24 hr tablet, One po daily With 300mg, Disp: 30 tablet, Rfl: 2  •  buPROPion XL (Wellbutrin XL) 300 MG 24 hr tablet, Take 1 tablet by mouth Daily., Disp: 30 tablet, Rfl: 2  •  butalbital-acetaminophen-caffeine (FIORICET, ESGIC) -40 MG per tablet, Take 1 tablet by mouth Every 4 (Four) Hours As Needed for Migraine., Disp: 20 tablet, Rfl: 0  •  diazePAM (Valium) 5 MG tablet, Take 1 tablet by mouth Daily As Needed for Anxiety., Disp: 10 tablet, Rfl: 0  •  hydroCHLOROthiazide (HYDRODIURIL) 25 MG tablet, Take 25 mg by mouth Daily., Disp: , Rfl:   •  hydroxychloroquine (PLAQUENIL) 200 MG tablet, Take  by mouth Daily., Disp: , Rfl:   •  lamoTRIgine (LaMICtal) 200 MG tablet, Take 1 tablet by mouth 2 (Two) Times a Day., Disp: 60 tablet, Rfl: 2  •  Lurasidone HCl (Latuda) 120 MG tablet tablet, Take 1 tablet by mouth Daily. Take 120 mg orally daily with a meal., Disp: 30 tablet, Rfl: 6  •  propranolol (INDERAL) 60 MG tablet, TAKE ONE TABLET BY MOUTH TWICE DAILY, Disp: 60 tablet, Rfl: 2  •  tiZANidine (ZANAFLEX) 4 MG tablet, Take 1 tablet by mouth Every 8 (Eight) Hours As Needed for Muscle Spasms., Disp: 20 tablet, Rfl: 0  •  ubrogepant (ubrogepant) 100 MG tablet, Take 1 tablet by mouth 1 (One) Time As Needed (migraine) for up to 2 doses., Disp: 10 tablet, Rfl: 5  •  vitamin D (ERGOCALCIFEROL) 1.25 MG (98843 UT) capsule capsule, Take 50,000 Units by mouth 1 (One) Time Per Week., Disp: , Rfl:     Allergies:   Allergies   Allergen Reactions   • Triptans Swelling     Felt like throat was closing up   •  Nsaids Other (See Comments)     Developed renal insufficiency after long term use       PHQ-9 Score:   PHQ-9 Total Score:       Objective     Physical Exam:   not currently breastfeeding. There is no height or weight on file to calculate BMI.     Physical Exam  Constitutional:       General: She is awake.      Appearance: Normal appearance. She is well-developed, well-groomed and normal weight.      Interventions: Face mask in place.      Comments: Face mask due to Covid   Musculoskeletal:         General: Normal range of motion.   Skin:     Findings: No acne.   Neurological:      General: No focal deficit present.      Mental Status: She is alert and oriented to person, place, and time.      Motor: No tremor.      Gait: Gait is intact.   Psychiatric:         Attention and Perception: Attention normal. She is attentive. She does not perceive auditory or visual hallucinations.         Mood and Affect: Mood and affect normal.         Speech: Speech normal.         Behavior: Behavior normal. Behavior is cooperative.         Thought Content: Thought content normal.         Cognition and Memory: Cognition and memory normal.         Judgment: Judgment normal.         Patient's Support Network Includes:  parents, and boyfriend    Prognosis: Good with Ongoing Treatment     Mental Status Exam:   Hygiene:   good  Cooperation:  Cooperative  Eye Contact:  Fair  Psychomotor Behavior:  Appropriate  Affect:  Appropriate  Mood: normal  Hopelessness: Denies  Speech:  Normal  Thought Process:  Goal directed  Thought Content:  Normal  Suicidal:  None  Homicidal:  None  Hallucinations:  None  Delusion:  None  Memory:  Intact  Orientation:  Person, Place, Time and Situation  Reliability:  good  Insight:  Good  Judgement:  Good  Impulse Control:  Good  Physical/Medical Issues:  No      Assessment / Plan      Assessment/Plan:   Diagnoses and all orders for this visit:    1. Bipolar II disorder (HCC) (Primary)         1. The therapist will  continue to promote the therapeutic alliance, address the patients issues and concerns, and strengthen her self awareness and self esteem. Continue to Work with the patient on the development of positive coping skills like visualization, music, art, and positive self talk. Work with the patient on utilizing Cognitive Behavioral Therapy to explore her emotions.     2. TREATMENT PLAN/GOALS: Continue supportive psychotherapy efforts and medications as indicated. Treatment and medication options discussed during today's visit. Patient ackowledged and verbally consented to continue with current treatment plan and was educated on the importance of compliance with treatment and follow-up appointments.     Counseled patient regarding multimodal approach with healthy nutrition, healthy sleep, regular physical activity, social activities, counseling, and medications.      Coping skills reviewed and encouraged positive framing of thoughts. No suicidal ideation or homicidal ideation at this time.      Assisted patient in processing above session content; acknowledged and normalized patient’s thoughts, feelings, and concerns.  Applied  positive coping skills and behavior management in session.    Allowed patient to freely discuss issues without interruption or judgment. Provided safe, confidential environment to facilitate the development of positive therapeutic relationship and encourage open, honest communication. Assisted patient in identifying risk factors which would indicate the need for higher level of care including thoughts to harm self or others and/or self-harming behavior and encouraged patient to contact this office, call 911, or present to the nearest emergency room should any of these events occur. Discussed crisis intervention services and means to access.     Follow Up:   No follow-ups on file.    TAMERA Bañuelos  This document has been electronically signed by TAMERA Bañuelos  December 28, 2021 15:00  EST    Please note that portions of this note were completed with a voice recognition program. Efforts were made to edit dictation, but occasionally words are mistranscribed.

## 2022-01-11 ENCOUNTER — TELEMEDICINE (OUTPATIENT)
Dept: NEUROLOGY | Facility: CLINIC | Age: 37
End: 2022-01-11

## 2022-01-11 VITALS — WEIGHT: 293 LBS | HEIGHT: 65 IN | BODY MASS INDEX: 48.82 KG/M2

## 2022-01-11 DIAGNOSIS — G43.719 INTRACTABLE CHRONIC MIGRAINE WITHOUT AURA AND WITHOUT STATUS MIGRAINOSUS: Primary | ICD-10-CM

## 2022-01-11 PROCEDURE — 99421 OL DIG E/M SVC 5-10 MIN: CPT | Performed by: NURSE PRACTITIONER

## 2022-01-11 NOTE — PROGRESS NOTES
Follow Up Neurology Office Visit      Patient Name: Vicky Lowery    Referring Physician: No ref. provider found    Chief Complaint:    Chief Complaint   Patient presents with   • Follow-up   • Neck Pain       History of Present Illness: Vicky Lowery is a 36 y.o. female who is here to follow up with Neurology for headaches.  She reports that she has no longer taking Nurtec, uncertain why, states that she did not get her last delivery.  She reports that her headaches have actually improved, she continues taking lamotrigine and propranolol.  She does feel that Fioricet helps for acute migraine episodes, uses this sparingly, no refill since September 2021.    The following portions of the patient's history were reviewed and updated as appropriate: allergies, current medications, past family history, past medical history, past social history, past surgical history and problem list.    Subjective     Review of Systems:   Review of Systems   Musculoskeletal: Positive for back pain, neck pain and neck stiffness.   Neurological: Positive for headache.   All other systems reviewed and are negative.    Medications:     Current Outpatient Medications:   •  albuterol sulfate  (90 Base) MCG/ACT inhaler, Inhale 2 puffs See Admin Instructions. inhale 2 puffs by mouth every 4 to 6 hours as needed, Disp: , Rfl:   •  buPROPion XL (Wellbutrin XL) 150 MG 24 hr tablet, One po daily With 300mg, Disp: 30 tablet, Rfl: 2  •  buPROPion XL (Wellbutrin XL) 300 MG 24 hr tablet, Take 1 tablet by mouth Daily., Disp: 30 tablet, Rfl: 2  •  butalbital-acetaminophen-caffeine (FIORICET, ESGIC) -40 MG per tablet, Take 1 tablet by mouth Every 4 (Four) Hours As Needed for Migraine., Disp: 20 tablet, Rfl: 0  •  diazePAM (Valium) 5 MG tablet, Take 1 tablet by mouth Daily As Needed for Anxiety., Disp: 10 tablet, Rfl: 0  •  hydroCHLOROthiazide (HYDRODIURIL) 25 MG tablet, Take 25 mg by mouth Daily., Disp: , Rfl:   •   "hydroxychloroquine (PLAQUENIL) 200 MG tablet, Take  by mouth Daily., Disp: , Rfl:   •  lamoTRIgine (LaMICtal) 200 MG tablet, Take 1 tablet by mouth 2 (Two) Times a Day., Disp: 60 tablet, Rfl: 2  •  Lurasidone HCl (Latuda) 120 MG tablet tablet, Take 1 tablet by mouth Daily. Take 120 mg orally daily with a meal., Disp: 30 tablet, Rfl: 6  •  propranolol (INDERAL) 60 MG tablet, TAKE ONE TABLET BY MOUTH TWICE DAILY, Disp: 60 tablet, Rfl: 2  •  tiZANidine (ZANAFLEX) 4 MG tablet, Take 1 tablet by mouth Every 8 (Eight) Hours As Needed for Muscle Spasms., Disp: 20 tablet, Rfl: 0  •  ubrogepant (ubrogepant) 100 MG tablet, Take 1 tablet by mouth 1 (One) Time As Needed (migraine) for up to 2 doses., Disp: 10 tablet, Rfl: 5  •  vitamin D (ERGOCALCIFEROL) 1.25 MG (50605 UT) capsule capsule, Take 50,000 Units by mouth 1 (One) Time Per Week., Disp: , Rfl:     Allergies:   Allergies   Allergen Reactions   • Triptans Swelling     Felt like throat was closing up   • Nsaids Other (See Comments)     Developed renal insufficiency after long term use       Objective     Physical Exam:  Vital Signs:   Vitals:    01/11/22 1415   Weight: (!) 204 kg (450 lb)  Comment: Used previous weight   Height: 165.1 cm (65\")  Comment: Used previous weight       Physical Exam  Pulmonary:      Effort: Pulmonary effort is normal.   Neurological:      Mental Status: She is oriented to person, place, and time. Mental status is at baseline.   Psychiatric:         Mood and Affect: Mood normal.         Speech: Speech normal.         Behavior: Behavior normal.       Neurologic Exam     Mental Status   Oriented to person, place, and time.   Attention: normal. Concentration: normal.   Speech: speech is normal   Level of consciousness: alert  Normal comprehension.     Results Review:   I reviewed the patient's new clinical results.  I have reviewed the patient's other medical records to include, labs, radiology and referrals.     Assessment / Plan  "     Assessment/Plan:   Diagnoses and all orders for this visit:    1. Intractable chronic migraine without aura and without status migrainosus (Primary)    Continue daily lamotrigine, propranolol. Fioricet prn for migraine attacks.      Follow Up:   Return in about 6 months (around 7/11/2022) for Next scheduled follow up.     FRANCK Peña  [facility]   This was an audio and video enabled telemedicine encounter lasting 7 minutes.   Please note that portions of this note may have been completed with a voice recognition program. Efforts were made to edit the dictations, but occasionally words are mistranscribed.

## 2022-01-18 ENCOUNTER — TRANSCRIBE ORDERS (OUTPATIENT)
Dept: ADMINISTRATIVE | Facility: HOSPITAL | Age: 37
End: 2022-01-18

## 2022-01-18 ENCOUNTER — TELEMEDICINE (OUTPATIENT)
Dept: BEHAVIORAL HEALTH | Facility: CLINIC | Age: 37
End: 2022-01-18

## 2022-01-18 DIAGNOSIS — F43.10 POST TRAUMATIC STRESS DISORDER (PTSD): ICD-10-CM

## 2022-01-18 DIAGNOSIS — F41.1 GAD (GENERALIZED ANXIETY DISORDER): Primary | ICD-10-CM

## 2022-01-18 DIAGNOSIS — F33.2 SEVERE EPISODE OF RECURRENT MAJOR DEPRESSIVE DISORDER, WITHOUT PSYCHOTIC FEATURES: ICD-10-CM

## 2022-01-18 DIAGNOSIS — M54.16 LUMBAR RADICULOPATHY: Primary | ICD-10-CM

## 2022-01-18 DIAGNOSIS — F31.81 BIPOLAR II DISORDER: ICD-10-CM

## 2022-01-18 PROCEDURE — 99213 OFFICE O/P EST LOW 20 MIN: CPT | Performed by: NURSE PRACTITIONER

## 2022-01-18 RX ORDER — LAMOTRIGINE 200 MG/1
200 TABLET ORAL 2 TIMES DAILY
Qty: 60 TABLET | Refills: 2 | Status: SHIPPED | OUTPATIENT
Start: 2022-01-18 | End: 2022-04-14 | Stop reason: SDUPTHER

## 2022-01-18 RX ORDER — BUPROPION HYDROCHLORIDE 300 MG/1
300 TABLET ORAL DAILY
Qty: 30 TABLET | Refills: 2 | Status: SHIPPED | OUTPATIENT
Start: 2022-01-18 | End: 2022-04-14 | Stop reason: SDUPTHER

## 2022-01-18 RX ORDER — DIAZEPAM 5 MG/1
5 TABLET ORAL DAILY PRN
Qty: 10 TABLET | Refills: 0 | Status: SHIPPED | OUTPATIENT
Start: 2022-01-18 | End: 2022-11-22 | Stop reason: SDUPTHER

## 2022-01-18 RX ORDER — BUPROPION HYDROCHLORIDE 150 MG/1
TABLET ORAL
Qty: 30 TABLET | Refills: 2 | Status: SHIPPED | OUTPATIENT
Start: 2022-01-18 | End: 2022-11-22 | Stop reason: DRUGHIGH

## 2022-01-18 NOTE — PROGRESS NOTES
Patient Name: Vicky Lowery  MRN: [id number]   :  1985     Chief Complaint:      ICD-10-CM ICD-9-CM   1. MARGIE (generalized anxiety disorder)  F41.1 300.02   2. Post traumatic stress disorder (PTSD)  F43.10 309.81   3. Severe episode of recurrent major depressive disorder, without psychotic features (HCC)  F33.2 296.33   4. Bipolar II disorder (HCC)  F31.81 296.89       History of Present Illness: Vicky Lowery is a 37 y.o. female is here today for medication management follow up. Pt can't tell if medication is working or not because she has a hard time remembering to take them consistently.     The following portions of the patient's history were reviewed and updated as appropriate: allergies, current medications, past family history, past medical history, past social history, past surgical history and problem list.    Review of Systems;;  Review of Systems   Constitutional: Negative for activity change, appetite change, fatigue, unexpected weight gain and unexpected weight loss.   Respiratory: Negative for shortness of breath and wheezing.    Gastrointestinal: Negative for constipation, diarrhea, nausea and vomiting.   Musculoskeletal: Negative for gait problem.   Skin: Negative for dry skin and rash.   Neurological: Negative for dizziness, speech difficulty, weakness, light-headedness, headache, memory problem and confusion.   Psychiatric/Behavioral: Positive for depressed mood. Negative for agitation, behavioral problems, decreased concentration, dysphoric mood, hallucinations, self-injury, sleep disturbance, suicidal ideas, negative for hyperactivity and stress. The patient is nervous/anxious.        Physical Exam;;  Physical Exam  Vitals and nursing note reviewed.   Constitutional:       General: She is not in acute distress.     Appearance: She is well-developed. She is not diaphoretic.   HENT:      Head: Normocephalic and atraumatic.   Eyes:      Conjunctiva/sclera: Conjunctivae  normal.   Cardiovascular:      Rate and Rhythm: Normal rate.   Pulmonary:      Effort: Pulmonary effort is normal. No respiratory distress.   Musculoskeletal:         General: Normal range of motion.      Cervical back: Full passive range of motion without pain and normal range of motion.   Skin:     General: Skin is warm and dry.   Neurological:      Mental Status: She is alert and oriented to person, place, and time.   Psychiatric:         Mood and Affect: Mood is anxious and depressed. Affect is not labile, blunt, angry or inappropriate.         Speech: Speech is not rapid and pressured or tangential.         Behavior: Behavior normal. Behavior is not agitated, slowed, aggressive, withdrawn, hyperactive or combative. Behavior is cooperative.         Thought Content: Thought content normal. Thought content is not paranoid or delusional. Thought content does not include homicidal or suicidal ideation. Thought content does not include homicidal or suicidal plan.         Judgment: Judgment normal.       not currently breastfeeding.  There is no height or weight on file to calculate BMI.    Current Medications;;    Current Outpatient Medications:   •  albuterol sulfate  (90 Base) MCG/ACT inhaler, Inhale 2 puffs See Admin Instructions. inhale 2 puffs by mouth every 4 to 6 hours as needed, Disp: , Rfl:   •  buPROPion XL (Wellbutrin XL) 150 MG 24 hr tablet, One po daily With 300mg, Disp: 30 tablet, Rfl: 2  •  buPROPion XL (Wellbutrin XL) 300 MG 24 hr tablet, Take 1 tablet by mouth Daily., Disp: 30 tablet, Rfl: 2  •  butalbital-acetaminophen-caffeine (FIORICET, ESGIC) -40 MG per tablet, Take 1 tablet by mouth Every 4 (Four) Hours As Needed for Migraine., Disp: 20 tablet, Rfl: 0  •  diazePAM (Valium) 5 MG tablet, Take 1 tablet by mouth Daily As Needed for Anxiety., Disp: 10 tablet, Rfl: 0  •  hydroCHLOROthiazide (HYDRODIURIL) 25 MG tablet, Take 25 mg by mouth Daily., Disp: , Rfl:   •  hydroxychloroquine  (PLAQUENIL) 200 MG tablet, Take  by mouth Daily., Disp: , Rfl:   •  lamoTRIgine (LaMICtal) 200 MG tablet, Take 1 tablet by mouth 2 (Two) Times a Day., Disp: 60 tablet, Rfl: 2  •  Lurasidone HCl (Latuda) 120 MG tablet tablet, Take 1 tablet by mouth Daily. Take 120 mg orally daily with a meal., Disp: 30 tablet, Rfl: 6  •  propranolol (INDERAL) 60 MG tablet, TAKE ONE TABLET BY MOUTH TWICE DAILY, Disp: 60 tablet, Rfl: 2  •  tiZANidine (ZANAFLEX) 4 MG tablet, Take 1 tablet by mouth Every 8 (Eight) Hours As Needed for Muscle Spasms., Disp: 20 tablet, Rfl: 0  •  ubrogepant (ubrogepant) 100 MG tablet, Take 1 tablet by mouth 1 (One) Time As Needed (migraine) for up to 2 doses., Disp: 10 tablet, Rfl: 5  •  vitamin D (ERGOCALCIFEROL) 1.25 MG (20831 UT) capsule capsule, Take 50,000 Units by mouth 1 (One) Time Per Week., Disp: , Rfl:     Lab Results:   No visits with results within 3 Month(s) from this visit.   Latest known visit with results is:   Orders Only on 01/21/2021   Component Date Value Ref Range Status   • Amphetamine, Urine Qual 01/21/2021 Negative  Wfshas=3921 ng/mL Final   • Barbiturates Screen, Urine 01/21/2021 Negative  Iipzrr=379 ng/mL Final   • Benzodiazepine Screen, Urine 01/21/2021 Positive* Equjcc=363 ng/mL Final   • THC Screen, Urine 01/21/2021 Positive* Cutoff=20 ng/mL Final   • Cocaine Screen, Urine 01/21/2021 Negative  Lekfde=089 ng/mL Final   • Opiate Screen, Urine 01/21/2021 Negative  Hdbzum=763 ng/mL Final    Opiate test includes Codeine, Morphine, Hydromorphone, Hydrocodone.   • Oxycodone/Oxymorphone, Urine 01/21/2021 Negative  Tagiui=569 ng/mL Final    Test includes Oxycodone and Oxymorphone   • Phencyclidine (PCP), Urine 01/21/2021 Negative  Cutoff=25 ng/mL Final   • Methadone Screen, Urine 01/21/2021 Negative  Gcswic=514 ng/mL Final   • Propoxyphene Screen 01/21/2021 Negative  Djtekh=815 ng/mL Final   • Creatinine, Urine 01/21/2021 131.7  20.0 - 300.0 mg/dL Final   • pH, UA 01/21/2021 8.4  4.5 -  8.9 Final   • Please note 01/21/2021 Comment   Final    Comment: This assay provides a preliminary unconfirmed analytical test  result that may be suitable for clinical management of patients  in certain situations. Drug-test results should be interpreted  in the context of clinical information. Patient metabolic  variables, specific drug chemistry, and specimen  characteristics can affect test outcome. Technical consultation  is available if a test result is inconsistent with an expected  outcome. ([email or web address] or call toll-free  [phone])         Mental Status Exam:   Hygiene:   good  Cooperation:  Cooperative  Eye Contact:  Good  Psychomotor Behavior:  Appropriate  Mood:anxious and depressed  Affect:  Appropriate  Hopelessness: Denies  Speech:  Normal  Thought Process:  Goal directed  Thought Content:  Normal  Suicidal:  None  Homicidal:  None  Hallucinations:  None  Delusion:  None  Memory:  Intact  Orientation:  Person, Place, Time and Situation  Reliability:  good  Insight:  Good  Judgement:  Good  Impulse Control:  Good  Physical/Medical Issues:  No     PHQ-9 Depression Screening  Little interest or pleasure in doing things?     Feeling down, depressed, or hopeless?     Trouble falling or staying asleep, or sleeping too much?     Feeling tired or having little energy?     Poor appetite or overeating?     Feeling bad about yourself - or that you are a failure or have let yourself or your family down?     Trouble concentrating on things, such as reading the newspaper or watching television?     Moving or speaking so slowly that other people could have noticed? Or the opposite - being so fidgety or restless that you have been moving around a lot more than usual?     Thoughts that you would be better off dead, or of hurting yourself in some way?     PHQ-9 Total Score     If you checked off any problems, how difficult have these problems made it for you to do your work, take care of things  at home, or get along with other people?          Assessment/Plan:  Diagnoses and all orders for this visit:    1. MARGIE (generalized anxiety disorder) (Primary)  -     buPROPion XL (Wellbutrin XL) 150 MG 24 hr tablet; One po daily With 300mg  Dispense: 30 tablet; Refill: 2  -     buPROPion XL (Wellbutrin XL) 300 MG 24 hr tablet; Take 1 tablet by mouth Daily.  Dispense: 30 tablet; Refill: 2  -     diazePAM (Valium) 5 MG tablet; Take 1 tablet by mouth Daily As Needed for Anxiety.  Dispense: 10 tablet; Refill: 0    2. Post traumatic stress disorder (PTSD)    3. Severe episode of recurrent major depressive disorder, without psychotic features (HCC)    4. Bipolar II disorder (HCC)  -     lamoTRIgine (LaMICtal) 200 MG tablet; Take 1 tablet by mouth 2 (Two) Times a Day.  Dispense: 60 tablet; Refill: 2      Encouraged consistency in taking medication so we know if we need to adjust anything. Pt states she will work harder at being consistent. [place] video appt start time 4:56 pm end time 5:07 pm    A psychological evaluation was conducted in order to assess past and current level of functioning. Areas assessed included, but were not limited to: perception of social support, perception of ability to face and deal with challenges in life (positive functioning), anxiety symptoms, depressive symptoms, perspective on beliefs/belief system, coping skills for stress, intelligence level,  Therapeutic rapport was established. Interventions conducted today were geared towards incorporating medication management along with support for continued therapy. Education was also provided as to the med management with this provider and what to expect in subsequent sessions.    We discussed risks, benefits,goals and side effects of the above medication and the patient was agreeable with the plan.Patient was educated on the importance of compliance with treatment and follow-up appointments. Patient is aware to contact the [facility] with  any worsening of symptoms. To call for questions or concerns and return early if necessary. Patent is agreeable to go to the Emergency Department or call 911 should they begin SI/HI.     Treatment Plan:   Discussed risks, benefits, and alternatives of medication. Encouraged healthy habits (eating, exercise and sleep). Call if any questions or problems arise. Medication reconciled. Controlled substance monitoring report reviewed. Provided psychoeducation.. Discussed coping strategies and current stressors. Set appropriate boundaries and limits for patient's well-being. Use distraction techniques to improve symptoms. Access support networks.      Return in about 6 weeks (around 3/1/2022) for Follow Up 30 min, Video visit.    FRANCK Segovia

## 2022-02-01 ENCOUNTER — HOSPITAL ENCOUNTER (OUTPATIENT)
Dept: MRI IMAGING | Facility: HOSPITAL | Age: 37
End: 2022-02-01

## 2022-02-02 ENCOUNTER — TELEPHONE (OUTPATIENT)
Dept: NEUROLOGY | Facility: CLINIC | Age: 37
End: 2022-02-02

## 2022-02-02 NOTE — TELEPHONE ENCOUNTER
New symptoms requires in office appointment, if she has any other symptoms like changes in vision or weakness on one side, she needs to go to ER to be evaluated for possible stroke.

## 2022-02-02 NOTE — TELEPHONE ENCOUNTER
Caller:LIDYA     Joe call back number: [phone]      What was the call regarding:  ALL OF THIS IS ON HER RT SIDE  BURNING SENSATION  AT NECK /HEADTHAT GOES AROUND EAR TO TEMPLE TO HER EYE. IT ALSO GOES TO SHOULDER AND BACK . THIS HAS  BEEN GOING ON FOR 2 WEEKS  NO BLURRED VISION BUT LIKE A WATER RIPPLE  FOR A INSTANT.     Do you require a callback: YES     PLEASE ADVISE.

## 2022-02-08 ENCOUNTER — HOSPITAL ENCOUNTER (OUTPATIENT)
Dept: MRI IMAGING | Facility: HOSPITAL | Age: 37
End: 2022-02-08

## 2022-03-02 ENCOUNTER — HOSPITAL ENCOUNTER (OUTPATIENT)
Dept: MRI IMAGING | Facility: HOSPITAL | Age: 37
Discharge: HOME | End: 2022-03-02
Admitting: PHYSICIAN ASSISTANT

## 2022-03-02 DIAGNOSIS — M54.16 LUMBAR RADICULOPATHY: ICD-10-CM

## 2022-03-02 PROCEDURE — 72148 MRI LUMBAR SPINE W/O DYE: CPT

## 2022-03-25 ENCOUNTER — OFFICE VISIT (OUTPATIENT)
Dept: NEUROLOGY | Facility: CLINIC | Age: 37
End: 2022-03-25

## 2022-03-25 VITALS
DIASTOLIC BLOOD PRESSURE: 80 MMHG | TEMPERATURE: 97.5 F | HEIGHT: 65 IN | HEART RATE: 93 BPM | OXYGEN SATURATION: 99 % | SYSTOLIC BLOOD PRESSURE: 130 MMHG | WEIGHT: 293 LBS | BODY MASS INDEX: 48.82 KG/M2

## 2022-03-25 DIAGNOSIS — G43.719 INTRACTABLE CHRONIC MIGRAINE WITHOUT AURA AND WITHOUT STATUS MIGRAINOSUS: Primary | ICD-10-CM

## 2022-03-25 PROCEDURE — 99213 OFFICE O/P EST LOW 20 MIN: CPT | Performed by: NURSE PRACTITIONER

## 2022-03-25 RX ORDER — PREDNISONE 10 MG/1
TABLET ORAL
COMMUNITY
Start: 2022-03-16

## 2022-03-25 RX ORDER — METFORMIN HYDROCHLORIDE 500 MG/1
TABLET, EXTENDED RELEASE ORAL
COMMUNITY
Start: 2022-03-16 | End: 2022-09-14

## 2022-03-25 RX ORDER — PROPRANOLOL HYDROCHLORIDE 160 MG/1
160 CAPSULE, EXTENDED RELEASE ORAL
Qty: 90 CAPSULE | Refills: 1 | Status: SHIPPED | OUTPATIENT
Start: 2022-03-25 | End: 2022-10-06

## 2022-03-25 RX ORDER — BUTALBITAL, ACETAMINOPHEN AND CAFFEINE 50; 325; 40 MG/1; MG/1; MG/1
1 TABLET ORAL EVERY 4 HOURS PRN
Qty: 20 TABLET | Refills: 0 | Status: SHIPPED | OUTPATIENT
Start: 2022-03-25 | End: 2022-11-22

## 2022-03-25 NOTE — PROGRESS NOTES
Follow Up Neurology Office Visit      Patient Name: Vicky Lowery    Referring Physician: No ref. provider found    Chief Complaint:    Chief Complaint   Patient presents with   • Follow-up     Patient in office to follow up on migraines.           History of Present Illness: Vicky Lowery is a 37 y.o. female who is here to follow up with Neurology for headaches.  She reports that her have been stable, no significant change in headache pattern.  She does note that if she misses dose of propranolol, she will have increased headaches.  She typically treats severe headaches with Fioricet by report, last filled in September 2021.  Continues on lamotrigine twice daily.    The following portions of the patient's history were reviewed and updated as appropriate: allergies, current medications, past family history, past medical history, past social history, past surgical history and problem list.    Subjective     Review of Systems:   Review of Systems   Eyes: Positive for visual disturbance.   Cardiovascular: Negative for palpitations.   Neurological: Positive for headache. Negative for dizziness.   All other systems reviewed and are negative.    Medications:     Current Outpatient Medications:   •  albuterol sulfate  (90 Base) MCG/ACT inhaler, Inhale 2 puffs See Admin Instructions. inhale 2 puffs by mouth every 4 to 6 hours as needed, Disp: , Rfl:   •  buPROPion XL (Wellbutrin XL) 150 MG 24 hr tablet, One po daily With 300mg, Disp: 30 tablet, Rfl: 2  •  buPROPion XL (Wellbutrin XL) 300 MG 24 hr tablet, Take 1 tablet by mouth Daily., Disp: 30 tablet, Rfl: 2  •  butalbital-acetaminophen-caffeine (FIORICET, ESGIC) -40 MG per tablet, Take 1 tablet by mouth Every 4 (Four) Hours As Needed for Migraine., Disp: 20 tablet, Rfl: 0  •  diazePAM (Valium) 5 MG tablet, Take 1 tablet by mouth Daily As Needed for Anxiety., Disp: 10 tablet, Rfl: 0  •  hydroCHLOROthiazide (HYDRODIURIL) 25 MG tablet, Take  "25 mg by mouth Daily., Disp: , Rfl:   •  hydroxychloroquine (PLAQUENIL) 200 MG tablet, Take  by mouth Daily., Disp: , Rfl:   •  lamoTRIgine (LaMICtal) 200 MG tablet, Take 1 tablet by mouth 2 (Two) Times a Day., Disp: 60 tablet, Rfl: 2  •  Lurasidone HCl (Latuda) 120 MG tablet tablet, Take 1 tablet by mouth Daily. Take 120 mg orally daily with a meal., Disp: 30 tablet, Rfl: 6  •  metFORMIN ER (GLUCOPHAGE-XR) 500 MG 24 hr tablet, , Disp: , Rfl:   •  predniSONE (DELTASONE) 10 MG tablet, , Disp: , Rfl:   •  tiZANidine (ZANAFLEX) 4 MG tablet, Take 1 tablet by mouth Every 8 (Eight) Hours As Needed for Muscle Spasms., Disp: 20 tablet, Rfl: 0  •  vitamin D (ERGOCALCIFEROL) 1.25 MG (18227 UT) capsule capsule, Take 50,000 Units by mouth 1 (One) Time Per Week., Disp: , Rfl:   •  propranolol LA (Inderal LA) 160 MG 24 hr capsule, Take 1 capsule by mouth Daily., Disp: 90 capsule, Rfl: 1  •  ubrogepant (ubrogepant) 100 MG tablet, Take 1 tablet by mouth 1 (One) Time for 1 dose., Disp: 4 tablet, Rfl: 0    Allergies:   Allergies   Allergen Reactions   • Triptans Swelling     Felt like throat was closing up   • Nsaids Other (See Comments)     Developed renal insufficiency after long term use     Objective     Physical Exam:  Vital Signs:   Vitals:    03/25/22 1538   BP: 130/80   BP Location: Right arm   Patient Position: Sitting   Cuff Size: Adult   Pulse: 93   Temp: 97.5 °F (36.4 °C)   SpO2: 99%   Weight: (!) 203 kg (448 lb 9.6 oz)   Height: 165.1 cm (65\")   PainSc:   8   PainLoc: Head  Comment: lower back/headache     Physical Exam  Vitals and nursing note reviewed.   Constitutional:       Appearance: She is morbidly obese.   Eyes:      Extraocular Movements: EOM normal.      Pupils: Pupils are equal, round, and reactive to light.   Neck:      Vascular: No carotid bruit.   Cardiovascular:      Rate and Rhythm: Regular rhythm.      Heart sounds: Normal heart sounds.   Pulmonary:      Effort: Pulmonary effort is normal.      Breath " sounds: Normal breath sounds.   Skin:     General: Skin is warm.   Neurological:      Mental Status: She is oriented to person, place, and time. Mental status is at baseline.      Coordination: Romberg Test normal.      Gait: Gait is intact.      Deep Tendon Reflexes: Strength normal.   Psychiatric:         Mood and Affect: Mood normal.         Speech: Speech normal.         Behavior: Behavior normal.         Thought Content: Thought content normal.       Neurologic Exam     Mental Status   Oriented to person, place, and time.   Attention: normal. Concentration: normal.   Speech: speech is normal   Level of consciousness: alert  Normal comprehension.     Cranial Nerves     CN II   Visual fields full to confrontation.   Visual acuity: normal with correction    CN III, IV, VI   Pupils are equal, round, and reactive to light.  Extraocular motions are normal.   Right pupil: Shape: regular. Reactivity: brisk.   Left pupil: Shape: regular. Reactivity: brisk.   Diplopia: none  Ophthalmoparesis: none  Upgaze: normal  Downgaze: normal    CN V   Facial sensation intact.     CN VII   Facial expression full, symmetric.     CN VIII   CN VIII normal.     CN IX, X   CN IX normal.   CN X normal.     CN XI   CN XI normal.     CN XII   CN XII normal.     Motor Exam   Muscle bulk: normal  Overall muscle tone: normal  Right arm pronator drift: absent  Left arm pronator drift: absent    Strength   Strength 5/5 throughout.     Sensory Exam   Light touch normal.     Gait, Coordination, and Reflexes     Gait  Gait: normal    Coordination   Romberg: negative    Tremor   Resting tremor: absent    Reflexes   Reflexes 2+ except as noted.     Results Review:   I reviewed the patient's new clinical results.  I have reviewed the patient's other medical records to include, labs, radiology and referrals.     Assessment / Plan      Assessment/Plan:   Diagnoses and all orders for this visit:    1. Intractable chronic migraine without aura and without  status migrainosus (Primary)  -     propranolol LA (Inderal LA) 160 MG 24 hr capsule; Take 1 capsule by mouth Daily.  Dispense: 90 capsule; Refill: 1  -     butalbital-acetaminophen-caffeine (FIORICET, ESGIC) -40 MG per tablet; Take 1 tablet by mouth Every 4 (Four) Hours As Needed for Migraine.  Dispense: 20 tablet; Refill: 0  -     ubrogepant (ubrogepant) 100 MG tablet; Take 1 tablet by mouth 1 (One) Time for 1 dose.  Dispense: 4 tablet; Refill: 0    Patient has responded well with propranolol, however she does note that if she misses a dose she has increased headache frequency.  She will begin once daily propranolol at 160 mg for headache prophylaxis.  Refills been prescribed Fioricet, she states that Ubrelvy works well for migraine attacks but unfortunately her insurance will not cover this, samples provided today.    Follow Up:   Return in about 3 months (around 6/25/2022) for Next scheduled follow up.     FRANCK Peña  [facility]   AS THE PROVIDER, I PERSONALLY WORE PPE DURING ENTIRE FACE TO FACE ENCOUNTER IN CLINIC WITH THE PATIENT. PATIENT ALSO WORE PPE DURING ENTIRE FACE TO FACE ENCOUNTER EXCEPT FOR A MAX OF 30 SECONDS DURING NEUROLOGICAL EVALUATION OF CRANIAL NERVES AND THEN MASK WAS PLACED BACK OVER PATIENT FACE FOR REMAINDER OF VISIT. I WASHED MY HANDS BEFORE AND AFTER VISIT.    Please note that portions of this note may have been completed with a voice recognition program. Efforts were made to edit the dictations, but occasionally words are mistranscribed.

## 2022-04-14 DIAGNOSIS — F31.81 BIPOLAR II DISORDER: ICD-10-CM

## 2022-04-14 DIAGNOSIS — F41.1 GAD (GENERALIZED ANXIETY DISORDER): ICD-10-CM

## 2022-04-14 RX ORDER — LAMOTRIGINE 200 MG/1
200 TABLET ORAL 2 TIMES DAILY
Qty: 60 TABLET | Refills: 2 | Status: SHIPPED | OUTPATIENT
Start: 2022-04-14 | End: 2022-11-22 | Stop reason: SDUPTHER

## 2022-04-14 RX ORDER — BUPROPION HYDROCHLORIDE 300 MG/1
300 TABLET ORAL DAILY
Qty: 30 TABLET | Refills: 2 | Status: SHIPPED | OUTPATIENT
Start: 2022-04-14 | End: 2022-10-06 | Stop reason: SDUPTHER

## 2022-04-14 NOTE — TELEPHONE ENCOUNTER
Rx Refill Note  Requested Prescriptions      No prescriptions requested or ordered in this encounter      Last office visit with prescribing clinician: 1/18/2022      Next office visit with prescribing clinician: Visit date not found            Alka Nichole MA  04/14/22, 13:49 EDT

## 2022-06-24 ENCOUNTER — TRANSCRIBE ORDERS (OUTPATIENT)
Dept: LAB | Facility: HOSPITAL | Age: 37
End: 2022-06-24

## 2022-06-24 ENCOUNTER — LAB (OUTPATIENT)
Dept: LAB | Facility: HOSPITAL | Age: 37
End: 2022-06-24

## 2022-06-24 DIAGNOSIS — M05.79 SEROPOSITIVE RHEUMATOID ARTHRITIS OF MULTIPLE SITES: Primary | ICD-10-CM

## 2022-06-24 DIAGNOSIS — Z79.899 ENCOUNTER FOR LONG-TERM (CURRENT) USE OF OTHER MEDICATIONS: ICD-10-CM

## 2022-06-24 DIAGNOSIS — M05.79 SEROPOSITIVE RHEUMATOID ARTHRITIS OF MULTIPLE SITES: ICD-10-CM

## 2022-06-24 LAB
ALBUMIN SERPL-MCNC: 4.2 G/DL (ref 3.5–5.2)
ALBUMIN/GLOB SERPL: 1.6 G/DL
ALP SERPL-CCNC: 85 U/L (ref 39–117)
ALT SERPL W P-5'-P-CCNC: 17 U/L (ref 1–33)
ANION GAP SERPL CALCULATED.3IONS-SCNC: 11.2 MMOL/L (ref 5–15)
AST SERPL-CCNC: 14 U/L (ref 1–32)
BILIRUB SERPL-MCNC: 0.3 MG/DL (ref 0–1.2)
BUN SERPL-MCNC: 15 MG/DL (ref 6–20)
BUN/CREAT SERPL: 17.2 (ref 7–25)
CALCIUM SPEC-SCNC: 9.2 MG/DL (ref 8.6–10.5)
CHLORIDE SERPL-SCNC: 105 MMOL/L (ref 98–107)
CO2 SERPL-SCNC: 22.8 MMOL/L (ref 22–29)
CREAT SERPL-MCNC: 0.87 MG/DL (ref 0.57–1)
CRP SERPL-MCNC: 1.41 MG/DL (ref 0–0.5)
EGFRCR SERPLBLD CKD-EPI 2021: 88.1 ML/MIN/1.73
GLOBULIN UR ELPH-MCNC: 2.7 GM/DL
GLUCOSE SERPL-MCNC: 94 MG/DL (ref 65–99)
POTASSIUM SERPL-SCNC: 4 MMOL/L (ref 3.5–5.2)
PROT SERPL-MCNC: 6.9 G/DL (ref 6–8.5)
SODIUM SERPL-SCNC: 139 MMOL/L (ref 136–145)

## 2022-06-24 PROCEDURE — 85652 RBC SED RATE AUTOMATED: CPT

## 2022-06-24 PROCEDURE — 80053 COMPREHEN METABOLIC PANEL: CPT

## 2022-06-24 PROCEDURE — 85025 COMPLETE CBC W/AUTO DIFF WBC: CPT

## 2022-06-24 PROCEDURE — 36415 COLL VENOUS BLD VENIPUNCTURE: CPT

## 2022-06-24 PROCEDURE — 86140 C-REACTIVE PROTEIN: CPT

## 2022-06-25 LAB
BASOPHILS # BLD AUTO: 0.07 10*3/MM3 (ref 0–0.2)
BASOPHILS NFR BLD AUTO: 0.8 % (ref 0–1.5)
DEPRECATED RDW RBC AUTO: 42.9 FL (ref 37–54)
EOSINOPHIL # BLD AUTO: 0.4 10*3/MM3 (ref 0–0.4)
EOSINOPHIL NFR BLD AUTO: 4.6 % (ref 0.3–6.2)
ERYTHROCYTE [DISTWIDTH] IN BLOOD BY AUTOMATED COUNT: 12.8 % (ref 12.3–15.4)
ERYTHROCYTE [SEDIMENTATION RATE] IN BLOOD: 41 MM/HR (ref 0–20)
HCT VFR BLD AUTO: 46.2 % (ref 34–46.6)
HGB BLD-MCNC: 15.5 G/DL (ref 12–15.9)
IMM GRANULOCYTES # BLD AUTO: 0.05 10*3/MM3 (ref 0–0.05)
IMM GRANULOCYTES NFR BLD AUTO: 0.6 % (ref 0–0.5)
LYMPHOCYTES # BLD AUTO: 2.49 10*3/MM3 (ref 0.7–3.1)
LYMPHOCYTES NFR BLD AUTO: 28.6 % (ref 19.6–45.3)
MCH RBC QN AUTO: 31 PG (ref 26.6–33)
MCHC RBC AUTO-ENTMCNC: 33.5 G/DL (ref 31.5–35.7)
MCV RBC AUTO: 92.4 FL (ref 79–97)
MONOCYTES # BLD AUTO: 0.89 10*3/MM3 (ref 0.1–0.9)
MONOCYTES NFR BLD AUTO: 10.2 % (ref 5–12)
NEUTROPHILS NFR BLD AUTO: 4.8 10*3/MM3 (ref 1.7–7)
NEUTROPHILS NFR BLD AUTO: 55.2 % (ref 42.7–76)
NRBC BLD AUTO-RTO: 0 /100 WBC (ref 0–0.2)
PLATELET # BLD AUTO: 247 10*3/MM3 (ref 140–450)
PMV BLD AUTO: 10.8 FL (ref 6–12)
RBC # BLD AUTO: 5 10*6/MM3 (ref 3.77–5.28)
WBC NRBC COR # BLD: 8.7 10*3/MM3 (ref 3.4–10.8)

## 2022-06-28 ENCOUNTER — TELEMEDICINE (OUTPATIENT)
Dept: NEUROLOGY | Facility: CLINIC | Age: 37
End: 2022-06-28

## 2022-06-28 DIAGNOSIS — G43.001 MIGRAINE WITHOUT AURA AND WITH STATUS MIGRAINOSUS, NOT INTRACTABLE: ICD-10-CM

## 2022-06-28 DIAGNOSIS — G43.719 INTRACTABLE CHRONIC MIGRAINE WITHOUT AURA AND WITHOUT STATUS MIGRAINOSUS: Primary | ICD-10-CM

## 2022-06-28 PROCEDURE — 99441 PR PHYS/QHP TELEPHONE EVALUATION 5-10 MIN: CPT | Performed by: NURSE PRACTITIONER

## 2022-06-28 NOTE — PROGRESS NOTES
Follow Up Neurology Office Visit      Patient Name: Vicky Lowery    Referring Physician: No ref. provider found    Chief Complaint:  No chief complaint on file.      History of Present Illness: Vicky Lowery is a 37 y.o. female who is here to follow up with Neurology for  Headaches. Reports that headahche frequency has been well controlled, estimates 2 per month. Typically uses Ubrelvy samples or Esgic.   Has moved to [place], appointment changed to video today d/t to transportation difficulties.     The following portions of the patient's history were reviewed and updated as appropriate: allergies, current medications, past family history, past medical history, past social history, past surgical history and problem list.    Subjective     Review of Systems:   Review of Systems   Musculoskeletal: Positive for back pain.   Neurological: Positive for headache.   All other systems reviewed and are negative.    Medications:     Current Outpatient Medications:   •  albuterol sulfate  (90 Base) MCG/ACT inhaler, Inhale 2 puffs See Admin Instructions. inhale 2 puffs by mouth every 4 to 6 hours as needed, Disp: , Rfl:   •  buPROPion XL (Wellbutrin XL) 150 MG 24 hr tablet, One po daily With 300mg, Disp: 30 tablet, Rfl: 2  •  buPROPion XL (Wellbutrin XL) 300 MG 24 hr tablet, Take 1 tablet by mouth Daily., Disp: 30 tablet, Rfl: 2  •  butalbital-acetaminophen-caffeine (FIORICET, ESGIC) -40 MG per tablet, Take 1 tablet by mouth Every 4 (Four) Hours As Needed for Migraine., Disp: 20 tablet, Rfl: 0  •  diazePAM (Valium) 5 MG tablet, Take 1 tablet by mouth Daily As Needed for Anxiety., Disp: 10 tablet, Rfl: 0  •  fluticasone (FLONASE) 50 MCG/ACT nasal spray, 2 sprays into the nostril(s) as directed by provider Daily., Disp: 16 g, Rfl: 0  •  hydroCHLOROthiazide (HYDRODIURIL) 25 MG tablet, Take 25 mg by mouth Daily., Disp: , Rfl:   •  hydroxychloroquine (PLAQUENIL) 200 MG tablet, Take  by mouth  Daily., Disp: , Rfl:   •  lamoTRIgine (LaMICtal) 200 MG tablet, Take 1 tablet by mouth 2 (Two) Times a Day., Disp: 60 tablet, Rfl: 2  •  Lurasidone HCl (Latuda) 120 MG tablet tablet, Take 1 tablet by mouth Daily. Take 120 mg orally daily with a meal., Disp: 30 tablet, Rfl: 6  •  metFORMIN ER (GLUCOPHAGE-XR) 500 MG 24 hr tablet, , Disp: , Rfl:   •  predniSONE (DELTASONE) 10 MG tablet, , Disp: , Rfl:   •  propranolol LA (Inderal LA) 160 MG 24 hr capsule, Take 1 capsule by mouth Daily., Disp: 90 capsule, Rfl: 1  •  tiZANidine (ZANAFLEX) 4 MG tablet, Take 1 tablet by mouth Every 8 (Eight) Hours As Needed for Muscle Spasms., Disp: 20 tablet, Rfl: 0  •  ubrogepant (ubrogepant) 100 MG tablet, Take 1 tablet by mouth 1 (One) Time As Needed (migraine) for up to 2 doses. Wait 2 hours before 2nd dose., Disp: 16 tablet, Rfl: 5  •  vitamin D (ERGOCALCIFEROL) 1.25 MG (57919 UT) capsule capsule, Take 50,000 Units by mouth 1 (One) Time Per Week., Disp: , Rfl:     Allergies:   Allergies   Allergen Reactions   • Triptans Swelling     Felt like throat was closing up   • Nsaids Other (See Comments)     Developed renal insufficiency after long term use     Objective     Physical Exam:  Vital Signs: There were no vitals filed for this visit.    Physical Exam  Constitutional:       Appearance: Normal appearance.   Neurological:      Mental Status: She is oriented to person, place, and time. Mental status is at baseline.   Psychiatric:         Speech: Speech normal.       Neurologic Exam     Mental Status   Oriented to person, place, and time.   Attention: normal. Concentration: normal.   Speech: speech is normal   Level of consciousness: alert  Normal comprehension.     Results Review:   I reviewed the patient's new clinical results.    Assessment / Plan      Assessment/Plan:   Diagnoses and all orders for this visit:    1. Intractable chronic migraine without aura and without status migrainosus (Primary)  -     Ambulatory Referral to  Neurology    2. Migraine without aura and with status migrainosus, not intractable  -     ubrogepant (ubrogepant) 100 MG tablet; Take 1 tablet by mouth 1 (One) Time As Needed (migraine) for up to 2 doses. Wait 2 hours before 2nd dose.  Dispense: 16 tablet; Refill: 5    Patient has recently moved to [place], requesting to transfer to [place] provider. Continue current medications.     Follow Up:   Return in about 6 months (around 12/28/2022).     FRANCK Peña  [facility]   This was an audio and video enabled telemedicine encounter lasting 6 minutes.     Please note that portions of this note may have been completed with a voice recognition program. Efforts were made to edit the dictations, but occasionally words are mistranscribed.

## 2022-07-05 ENCOUNTER — OFFICE VISIT (OUTPATIENT)
Dept: NEUROSURGERY | Facility: CLINIC | Age: 37
End: 2022-07-05

## 2022-07-05 VITALS
DIASTOLIC BLOOD PRESSURE: 80 MMHG | SYSTOLIC BLOOD PRESSURE: 124 MMHG | WEIGHT: 293 LBS | TEMPERATURE: 97.8 F | BODY MASS INDEX: 48.82 KG/M2 | HEIGHT: 65 IN

## 2022-07-05 DIAGNOSIS — M54.41 CHRONIC BILATERAL LOW BACK PAIN WITH RIGHT-SIDED SCIATICA: ICD-10-CM

## 2022-07-05 DIAGNOSIS — M47.816 LUMBAR SPONDYLOSIS: Primary | ICD-10-CM

## 2022-07-05 DIAGNOSIS — G89.29 CHRONIC BILATERAL LOW BACK PAIN WITH RIGHT-SIDED SCIATICA: ICD-10-CM

## 2022-07-05 DIAGNOSIS — M51.9 LUMBAR DISC DISEASE: ICD-10-CM

## 2022-07-05 DIAGNOSIS — M19.90 ARTHRITIS: ICD-10-CM

## 2022-07-05 PROCEDURE — 99204 OFFICE O/P NEW MOD 45 MIN: CPT | Performed by: PHYSICIAN ASSISTANT

## 2022-07-05 NOTE — PROGRESS NOTES
Patient: Vicky Lowery  : 1985  Chart #: [id number]    Date of Service: 2022    Chief Complaint   Patient presents with   • Back Pain       HPI  This 38 yo WF presents with chronic LBP since  and even before. When her back pain gets worse with bending she will get pain into the right buttock, right lateral thight with numbness and sometimes pain down to the foot. She also has chronic neck pain and migrane HA's. She has been to PM and had injections with short time results.     Chronic Illnesses:  Obesity 72.02    Past Medical History:   Diagnosis Date   • Anxiety    • Anxiety    • Arthritis    • Asthma    • Back pain    • Bipolar disorder (HCC)    • Cervical disc disorder    • Depression    • Kidney disease, chronic, stage II (mild, EGFR 60+ ml/min)    • Low back pain    • Migraine    • Mitral valve prolapse    • MRSA (methicillin resistant Staphylococcus aureus)    • Peripheral neuropathy    • Tattoo          Past Surgical History:   Procedure Laterality Date   • EPIDURAL BLOCK     • FIBULA FRACTURE SURGERY  ,    • MANDIBLE FRACTURE SURGERY     • TRIGGER POINT INJECTION     • WISDOM TOOTH EXTRACTION         Allergies   Allergen Reactions   • Triptans Swelling     Felt like throat was closing up   • Nsaids Other (See Comments)     Developed renal insufficiency after long term use         Current Outpatient Medications:   •  albuterol sulfate  (90 Base) MCG/ACT inhaler, Inhale 2 puffs See Admin Instructions. inhale 2 puffs by mouth every 4 to 6 hours as needed, Disp: , Rfl:   •  buPROPion XL (Wellbutrin XL) 150 MG 24 hr tablet, One po daily With 300mg, Disp: 30 tablet, Rfl: 2  •  buPROPion XL (Wellbutrin XL) 300 MG 24 hr tablet, Take 1 tablet by mouth Daily., Disp: 30 tablet, Rfl: 2  •  butalbital-acetaminophen-caffeine (FIORICET, ESGIC) -40 MG per tablet, Take 1 tablet by mouth Every 4 (Four) Hours As Needed for Migraine., Disp: 20 tablet, Rfl: 0  •  diazePAM  (Valium) 5 MG tablet, Take 1 tablet by mouth Daily As Needed for Anxiety., Disp: 10 tablet, Rfl: 0  •  fluticasone (FLONASE) 50 MCG/ACT nasal spray, 2 sprays into the nostril(s) as directed by provider Daily., Disp: 16 g, Rfl: 0  •  hydroCHLOROthiazide (HYDRODIURIL) 25 MG tablet, Take 25 mg by mouth Daily., Disp: , Rfl:   •  hydroxychloroquine (PLAQUENIL) 200 MG tablet, Take  by mouth Daily., Disp: , Rfl:   •  lamoTRIgine (LaMICtal) 200 MG tablet, Take 1 tablet by mouth 2 (Two) Times a Day., Disp: 60 tablet, Rfl: 2  •  Lurasidone HCl (Latuda) 120 MG tablet tablet, Take 1 tablet by mouth Daily. Take 120 mg orally daily with a meal., Disp: 30 tablet, Rfl: 6  •  metFORMIN ER (GLUCOPHAGE-XR) 500 MG 24 hr tablet, , Disp: , Rfl:   •  predniSONE (DELTASONE) 10 MG tablet, , Disp: , Rfl:   •  propranolol LA (Inderal LA) 160 MG 24 hr capsule, Take 1 capsule by mouth Daily., Disp: 90 capsule, Rfl: 1  •  tiZANidine (ZANAFLEX) 4 MG tablet, Take 1 tablet by mouth Every 8 (Eight) Hours As Needed for Muscle Spasms., Disp: 20 tablet, Rfl: 0  •  ubrogepant (ubrogepant) 100 MG tablet, Take 1 tablet by mouth 1 (One) Time As Needed (migraine) for up to 2 doses. Wait 2 hours before 2nd dose., Disp: 16 tablet, Rfl: 5  •  vitamin D (ERGOCALCIFEROL) 1.25 MG (73198 UT) capsule capsule, Take 50,000 Units by mouth 1 (One) Time Per Week., Disp: , Rfl:     Social History     Socioeconomic History   • Marital status:    Tobacco Use   • Smoking status: Current Some Day Smoker     Packs/day: 0.50     Years: 3.00     Pack years: 1.50     Types: Cigarettes   • Smokeless tobacco: Never Used   Vaping Use   • Vaping Use: Never used   Substance and Sexual Activity   • Alcohol use: Yes     Comment: social   • Drug use: No   • Sexual activity: Yes     Partners: Male     Birth control/protection: None       Family History   Problem Relation Age of Onset   • Breast cancer Mother    • Arthritis Mother    • Cancer Mother    • Diabetes Maternal  Grandfather    • Hypertension Father    • Migraines Father    • Arthritis Maternal Grandmother    • Colon cancer Neg Hx        Class 3 Severe Obesity (BMI >=40). Obesity-related health conditions include the following: osteoarthritis. Obesity is unchanged. BMI is is above average; BMI management plan is completed. We discussed portion control, increasing exercise and referral to PT and weight loss evaluation..       Social History    Tobacco Use      Smoking status: Current Some Day Smoker        Packs/day: 0.50        Years: 3.00        Pack years: 1.5        Types: Cigarettes      Smokeless tobacco: Never Used  Vicky Lowery  reports that she has been smoking cigarettes. She has a 1.50 pack-year smoking history. She has never used smokeless tobacco.. I have educated her on the risk of diseases from using tobacco products such as cancer, COPD, heart disease and DDD.     I advised her to quit and she is not willing to quit.    I spent 4.5 minutes counseling the patient.            Review of Systems   Constitutional: Positive for activity change and fatigue. Negative for appetite change, chills, diaphoresis, fever and unexpected weight change.   HENT: Positive for congestion. Negative for dental problem, drooling, ear discharge, ear pain, facial swelling, hearing loss, mouth sores, nosebleeds, postnasal drip, rhinorrhea, sinus pressure, sinus pain, sneezing, sore throat, tinnitus, trouble swallowing and voice change.    Eyes: Negative for photophobia, pain, discharge, redness, itching and visual disturbance.   Respiratory: Positive for shortness of breath. Negative for apnea, cough, choking, chest tightness, wheezing and stridor.    Cardiovascular: Positive for palpitations. Negative for chest pain and leg swelling.   Gastrointestinal: Negative for abdominal distention, abdominal pain, anal bleeding, blood in stool, constipation, diarrhea, nausea, rectal pain and vomiting.   Endocrine: Negative for cold  "intolerance, heat intolerance, polydipsia, polyphagia and polyuria.   Genitourinary: Negative for decreased urine volume, difficulty urinating, dyspareunia, dysuria, enuresis, flank pain, frequency, genital sores, hematuria, menstrual problem, pelvic pain, urgency, vaginal bleeding, vaginal discharge and vaginal pain.   Musculoskeletal: Positive for arthralgias, back pain, joint swelling, myalgias and neck pain. Negative for gait problem and neck stiffness.   Skin: Negative for color change, pallor, rash and wound.   Allergic/Immunologic: Negative for environmental allergies, food allergies and immunocompromised state.   Neurological: Positive for dizziness, weakness, light-headedness, numbness and headaches. Negative for tremors, seizures, syncope, facial asymmetry and speech difficulty.   Hematological: Negative for adenopathy. Does not bruise/bleed easily.   Psychiatric/Behavioral: Positive for dysphoric mood. Negative for agitation, behavioral problems, confusion, decreased concentration, hallucinations, self-injury, sleep disturbance and suicidal ideas. The patient is nervous/anxious. The patient is not hyperactive.         Physical examination:  Blood pressure 124/80, temperature 97.8 °F (36.6 °C), temperature source Infrared, height 165.1 cm (65\"), weight (!) 196 kg (432 lb 12.8 oz), not currently breastfeeding.  HEENT- normocephalic, atraumatic, sclera clear  Lungs-normal expansion, no wheezing  Heart-regular rate and rhythm  Extremities-positive pulses, no edema    Neurologic Exam  WDWNWF  A/A/C, speech clear, attention normal, conversant, answers questions appropriately, good historian.  Cranial nerves II through XII are intact.  Gait is normal, balance is normal.   No tremors are noted.    Radiographic Imaging:  For my review is a lumbar MRI that reveals DDD at L4-5 and L5-S1. There is a central disc protrusion at L5-S1.    Medical Decision Making  Diagnoses and all orders for this visit:    1. Lumbar " spondylosis (Primary)  -     Ambulatory Referral to Physical Therapy Evaluate and treat    2. Lumbar disc disease  -     Ambulatory Referral to Physical Therapy Evaluate and treat    3. Chronic bilateral low back pain with right-sided sciatica    4. Arthritis    Patient is not interested for Bariatric surgery at this time so we are going to PT at [facility] and they offer a weight loss program associated with PT. He has a future appointment with [facility] to discuss her hormonal condition and weight gain. She will be seeing Neurology regarding her HA's. She is currently not a surgical candidate.     Pricilla Alanis, PAC    Patient Care Team:  Aria Moraes APRN as PCP - General (Family Medicine)  Glenn Gonzales PA-C as Referring Physician (Physician Assistant)

## 2022-07-08 NOTE — PROGRESS NOTES
Subjective   Chief Complaint   Patient presents with   • Follow-up     Progesterone      Vicky Lowery is a 34 y.o. year old .  Patient's last menstrual period was 10/10/2019.  She presents to be seen because of anovulation associate with infertility.  Patient's recent 21-day progesterone level was 2.21 indicating no ovulation.  Her menses are regular.  She is not taking any psychotropics currently.  She has an appointment with her therapist this Thursday..     OTHER COMPLAINTS:  Nothing else    The following portions of the patient's history were reviewed and updated as appropriate:  She  has a past medical history of Anxiety, Anxiety, Asthma, Bipolar disorder (CMS/HCC), Depression, Mitral valve prolapse, and Tattoo.  She does not have any pertinent problems on file.  She  has a past surgical history that includes Fibula Fracture Surgery (, ); Mandible fracture surgery (); and Thayer tooth extraction.  Her family history includes Arthritis in her maternal grandmother and mother; Breast cancer in her mother; Diabetes in her maternal grandfather; Hypertension in her father; Migraines in her father.  She  reports that she has never smoked. She has never used smokeless tobacco. She reports that she drinks about 0.6 oz of alcohol per week. She reports that she does not use drugs.  Current Outpatient Medications   Medication Sig Dispense Refill   • ARIPiprazole (ABILIFY) 30 MG tablet Daily.     • bisoprolol (ZEBeta) 10 MG tablet Take 10 mg by mouth Daily.  3   • clomiPHENE (CLOMID) 50 MG tablet One po on cycle days 3 through 7 5 tablet 2   • lamoTRIgine (LaMICtal) 25 MG tablet Take 25 mg by mouth Daily.     • lidocaine (LIDODERM) 5 % Place 1 patch on the skin as directed by provider Daily. Remove & Discard patch within 12 hours or as directed by MD 30 each 0   • tiZANidine (ZANAFLEX) 4 MG tablet Take 4 mg by mouth Daily.  0   • VENTOLIN  (90 BASE) MCG/ACT inhaler INHALE 2 PUFF BY  "INHALATION ROUTE EVERY 4 - 6 HOURS AS NEEDED  0     No current facility-administered medications for this visit.      Current Outpatient Medications on File Prior to Visit   Medication Sig   • ARIPiprazole (ABILIFY) 30 MG tablet Daily.   • bisoprolol (ZEBeta) 10 MG tablet Take 10 mg by mouth Daily.   • lamoTRIgine (LaMICtal) 25 MG tablet Take 25 mg by mouth Daily.   • lidocaine (LIDODERM) 5 % Place 1 patch on the skin as directed by provider Daily. Remove & Discard patch within 12 hours or as directed by MD   • tiZANidine (ZANAFLEX) 4 MG tablet Take 4 mg by mouth Daily.   • VENTOLIN  (90 BASE) MCG/ACT inhaler INHALE 2 PUFF BY INHALATION ROUTE EVERY 4 - 6 HOURS AS NEEDED     No current facility-administered medications on file prior to visit.      She has No Known Allergies.    Social History    Tobacco Use      Smoking status: Never Smoker      Smokeless tobacco: Never Used    Review of Systems  Consitutional POS: nothing reported    NEG: anorexia or night sweats   Gastointestinal POS: nothing reported    NEG: bloating, change in bowel habits, melena or reflux symptoms   Genitourinary POS: nothing reported    NEG: dysuria or hematuria   Integument POS: nothing reported    NEG: moles that are changing in size, shape, color or rashes   Breast POS: nothing reported    NEG: persistent breast lump, skin dimpling or nipple discharge         Respiratory: negative  Cardiovascular: negative          Objective   /70   Ht 165.1 cm (65\")   Wt (!) 181 kg (398 lb)   LMP 10/10/2019   BMI 66.23 kg/m²     General:  well developed; well nourished  no acute distress  obese - Body mass index is 66.23 kg/m².   Skin:  Not performed.   Thyroid: not examined   Lungs:  breathing is unlabored   Heart:  Not performed.   Breasts:  Not performed.   Abdomen: soft, non-tender; no masses  no umbilical or inguinal hernias are present  no hepato-splenomegaly   Pelvis: Not performed.     Psychiatric: Alert and oriented ×3, mood and " affect appropriate  HEENT: Atraumatic, normocephalic, normal scleral icterus  Extremities: 2+ pulses bilaterally, no edema      Lab Review   Progesterone level as noted above    Imaging   No data reviewed        Assessment   1. Infertility associate with anovulation     Plan   1. Clomid 50 mg 1 p.o. daily on cycle days 3 through 7.  Timed intercourse.  Continue day 21 progesterone's.  Risk benefits alternatives were discussed.  2.     No orders of the defined types were placed in this encounter.         This note was electronically signed.      October 22, 2019       denies

## 2022-07-21 ENCOUNTER — TELEPHONE (OUTPATIENT)
Dept: NEUROLOGY | Facility: CLINIC | Age: 37
End: 2022-07-21

## 2022-09-14 ENCOUNTER — OFFICE VISIT (OUTPATIENT)
Dept: NEUROLOGY | Facility: CLINIC | Age: 37
End: 2022-09-14

## 2022-09-14 VITALS
BODY MASS INDEX: 48.82 KG/M2 | DIASTOLIC BLOOD PRESSURE: 80 MMHG | SYSTOLIC BLOOD PRESSURE: 110 MMHG | OXYGEN SATURATION: 96 % | WEIGHT: 293 LBS | HEART RATE: 79 BPM | HEIGHT: 65 IN

## 2022-09-14 DIAGNOSIS — G43.719 INTRACTABLE CHRONIC MIGRAINE WITHOUT AURA AND WITHOUT STATUS MIGRAINOSUS: Primary | ICD-10-CM

## 2022-09-14 PROCEDURE — 99214 OFFICE O/P EST MOD 30 MIN: CPT | Performed by: PSYCHIATRY & NEUROLOGY

## 2022-09-14 RX ORDER — FREMANEZUMAB-VFRM 225 MG/1.5ML
225 INJECTION SUBCUTANEOUS
Qty: 1.5 ML | Refills: 11 | Status: SHIPPED | OUTPATIENT
Start: 2022-09-14 | End: 2023-01-10

## 2022-09-14 RX ORDER — BUPROPION HYDROCHLORIDE 150 MG/1
TABLET, EXTENDED RELEASE ORAL
COMMUNITY
End: 2022-09-14

## 2022-09-14 NOTE — PROGRESS NOTES
Subjective:    CC: Vicky Lowery is seen today in consultation at the request of No ref. provider found for Migraine and Neck Pain       HPI:  Patient is a 37-year-old female with past medical history of intractable migraines, neck pain referred to clinic to establish care.  She was being followed by [facility] and was seeing FRANCK Sapp for management of migraines.  She reports that overall migraines have remained under good control with on an average frequency of 1 migraine in a week however some weeks more, she may get 3-4 migraines in a week.  She reports it typically starts in the right occipital, right neck region with radiating pain up to the top of the head and sometimes around the right eye associated with light and sound sensitivity and sometimes nausea.  She reports that currently she is on propranolol long-acting 160 mg daily for migraine prevention.  She reports that she tried Topamax in the past many years ago which worked for some time then it stopped working.  She is currently using Ubrelvy as needed as an abortive treatment and seems to be working well.    The following portions of the patient's history were reviewed today and updated as of 09/14/2022  : allergies, social history and problem list.  This document will be scanned to patient's chart.      Current Outpatient Medications:   •  albuterol sulfate  (90 Base) MCG/ACT inhaler, Inhale 2 puffs See Admin Instructions. inhale 2 puffs by mouth every 4 to 6 hours as needed, Disp: , Rfl:   •  buPROPion XL (Wellbutrin XL) 150 MG 24 hr tablet, One po daily With 300mg, Disp: 30 tablet, Rfl: 2  •  buPROPion XL (Wellbutrin XL) 300 MG 24 hr tablet, Take 1 tablet by mouth Daily., Disp: 30 tablet, Rfl: 2  •  butalbital-acetaminophen-caffeine (FIORICET, ESGIC) -40 MG per tablet, Take 1 tablet by mouth Every 4 (Four) Hours As Needed for Migraine., Disp: 20 tablet, Rfl: 0  •  diazePAM (Valium) 5 MG tablet, Take  1 tablet by mouth Daily As Needed for Anxiety., Disp: 10 tablet, Rfl: 0  •  hydroCHLOROthiazide (HYDRODIURIL) 25 MG tablet, Take 25 mg by mouth Daily., Disp: , Rfl:   •  hydroxychloroquine (PLAQUENIL) 200 MG tablet, Take  by mouth Daily., Disp: , Rfl:   •  lamoTRIgine (LaMICtal) 200 MG tablet, Take 1 tablet by mouth 2 (Two) Times a Day., Disp: 60 tablet, Rfl: 2  •  Lurasidone HCl (Latuda) 120 MG tablet tablet, Take 1 tablet by mouth Daily. Take 120 mg orally daily with a meal., Disp: 30 tablet, Rfl: 6  •  predniSONE (DELTASONE) 10 MG tablet, , Disp: , Rfl:   •  propranolol LA (Inderal LA) 160 MG 24 hr capsule, Take 1 capsule by mouth Daily., Disp: 90 capsule, Rfl: 1  •  tiZANidine (ZANAFLEX) 4 MG tablet, Take 1 tablet by mouth Every 8 (Eight) Hours As Needed for Muscle Spasms., Disp: 20 tablet, Rfl: 0  •  ubrogepant (ubrogepant) 100 MG tablet, Take 1 tablet by mouth 1 (One) Time As Needed (migraine) for up to 2 doses. Wait 2 hours before 2nd dose., Disp: 16 tablet, Rfl: 5  •  vitamin D (ERGOCALCIFEROL) 1.25 MG (49921 UT) capsule capsule, Take 50,000 Units by mouth 1 (One) Time Per Week., Disp: , Rfl:   •  Fremanezumab-vfrm (Ajovy) 225 MG/1.5ML solution auto-injector, Inject 225 mg under the skin into the appropriate area as directed Every 30 (Thirty) Days., Disp: 1.5 mL, Rfl: 11   Past Medical History:   Diagnosis Date   • Anxiety    • Anxiety    • Arthritis    • Asthma    • Back pain    • Bipolar disorder (HCC)    • Cervical disc disorder    • Depression    • Fibromyalgia, primary    • Kidney disease, chronic, stage II (mild, EGFR 60+ ml/min)    • Low back pain    • Migraine    • Mitral valve prolapse    • MRSA (methicillin resistant Staphylococcus aureus)    • Peripheral neuropathy    • Tattoo       Past Surgical History:   Procedure Laterality Date   • EPIDURAL BLOCK     • FIBULA FRACTURE SURGERY  2014, 2015   • MANDIBLE FRACTURE SURGERY  2002   • TRIGGER POINT INJECTION     • WISDOM TOOTH EXTRACTION       "  Family History   Problem Relation Age of Onset   • Breast cancer Mother    • Arthritis Mother    • Cancer Mother    • Diabetes Maternal Grandfather    • Hypertension Father    • Migraines Father    • Arthritis Maternal Grandmother    • Colon cancer Neg Hx       Review of Systems    All other systems reviewed and are negative     Objective:    /80   Pulse 79   Ht 165.1 cm (65\")   Wt (!) 196 kg (432 lb)   SpO2 96%   BMI 71.89 kg/m²     Neurology Exam:    General apperance: NAD.     Mental status: Alert, awake and oriented to time place and person.    Recent and Remote memory: Can recall 3/3 objects at 5 minutes. Can recall historical events.     Attention span and Concentration: Serial 7s: Normal.     Fund of knowledge:  Normal.     Language and Speech: No aphasia or dysarthria.    Naming , Repitition and Comprehension:  Can name objects, repeat a sentence and follow commands. Speech is clear and fluent with good repetition, comprehension, and naming.    Cranial Nerves:   CN II: Visual fields are full. Intact. Fundi - Normal, No papillederma, Pupils - GRUPO  CN III, IV and VI: Extraocular movements are intact. Normal saccades.   CN V: Facial sensation is intact.   CN VII: Muscles of facial expression reveal no asymmetry. Intact.   CN VIII: Hearing is intact. Whispered voice intact.   CN IX and X: Palate elevates symmetrically. Intact  CN XI: Shoulder shrug is intact.   CN XII: Tongue is midline without evidence of atrophy or fasciculation.     Motor:  Right UE muscle strength 5/5. Normal tone.     Left UE muscle strength 5/5. Normal tone.      Right LE muscle strength5/5. Normal tone.     Left LE muscle strength 5/5. Normal tone.      Sensory: Normal light touch, vibration and pinprick sensation bilaterally.    DTRs: 2+ bilaterally in upper and lower extremities.    Babinski: Negative bilaterally.    Co-ordination: Normal finger-to-nose, heel to shin B/L.    Rhomberg: Negative.    Gait: " Normal.    Cardiovascular: Regular rate and rhythm without murmur, gallop or rub.    Ophthalmoscopic exam: Normal fundi, no papilledema.    Assessment and Plan:  1. Intractable chronic migraine without aura and without status migrainosus  With propranolol long-acting 160 mg daily, migraine frequency is around 4-8 migraine days in a month.  I discussed adding once a month CGRP blocker to help reduce migraine frequency further.  She is interested in trying the same.  I will be sending prescription of Ajovy once a month injection.  I am hoping that with Ajovy, migraine frequency will reduce further.  Continue with Ubrelvy as needed as an abortive treatment and seems to be working well and I will see her back in 6 to 8 weeks for follow-up.       Return in about 8 weeks (around 11/9/2022).     Attila Miramontes MD

## 2022-10-06 DIAGNOSIS — F41.1 GAD (GENERALIZED ANXIETY DISORDER): ICD-10-CM

## 2022-10-06 DIAGNOSIS — G43.719 INTRACTABLE CHRONIC MIGRAINE WITHOUT AURA AND WITHOUT STATUS MIGRAINOSUS: ICD-10-CM

## 2022-10-06 RX ORDER — BUPROPION HYDROCHLORIDE 300 MG/1
TABLET ORAL
Qty: 30 TABLET | Refills: 2 | OUTPATIENT
Start: 2022-10-06

## 2022-10-06 RX ORDER — BUPROPION HYDROCHLORIDE 300 MG/1
300 TABLET ORAL DAILY
Qty: 30 TABLET | Refills: 0 | Status: SHIPPED | OUTPATIENT
Start: 2022-10-06 | End: 2022-11-22 | Stop reason: SDUPTHER

## 2022-10-06 RX ORDER — PROPRANOLOL HYDROCHLORIDE 160 MG/1
CAPSULE, EXTENDED RELEASE ORAL
Qty: 90 CAPSULE | Refills: 1 | Status: SHIPPED | OUTPATIENT
Start: 2022-10-06

## 2022-11-22 ENCOUNTER — OFFICE VISIT (OUTPATIENT)
Dept: BEHAVIORAL HEALTH | Facility: CLINIC | Age: 37
End: 2022-11-22

## 2022-11-22 VITALS — BODY MASS INDEX: 66.76 KG/M2 | WEIGHT: 293 LBS

## 2022-11-22 DIAGNOSIS — F33.0 MAJOR DEPRESSIVE DISORDER, RECURRENT, MILD: ICD-10-CM

## 2022-11-22 DIAGNOSIS — F31.9 BIPOLAR DEPRESSION: Primary | ICD-10-CM

## 2022-11-22 DIAGNOSIS — F31.81 BIPOLAR II DISORDER: ICD-10-CM

## 2022-11-22 DIAGNOSIS — F43.10 POST TRAUMATIC STRESS DISORDER (PTSD): ICD-10-CM

## 2022-11-22 DIAGNOSIS — F41.1 GAD (GENERALIZED ANXIETY DISORDER): ICD-10-CM

## 2022-11-22 PROCEDURE — 99214 OFFICE O/P EST MOD 30 MIN: CPT

## 2022-11-22 RX ORDER — LAMOTRIGINE 200 MG/1
200 TABLET ORAL 2 TIMES DAILY
Qty: 60 TABLET | Refills: 1 | Status: SHIPPED | OUTPATIENT
Start: 2022-11-22

## 2022-11-22 RX ORDER — DIAZEPAM 5 MG/1
5 TABLET ORAL DAILY PRN
Qty: 10 TABLET | Refills: 1 | Status: SHIPPED | OUTPATIENT
Start: 2022-11-22 | End: 2023-01-17

## 2022-11-22 RX ORDER — BUPROPION HYDROCHLORIDE 300 MG/1
300 TABLET ORAL DAILY
Qty: 30 TABLET | Refills: 1 | Status: SHIPPED | OUTPATIENT
Start: 2022-11-22 | End: 2023-02-16

## 2022-11-22 NOTE — PROGRESS NOTES
Follow Up Office Visit    Patient Name: Vicky Lowery  : 1985   MRN: [id number]   Care Team: Patient Care Team:  Aria Moraes APRN as PCP - General (Family Medicine)  Glenn Gonzales PA-C as Referring Physician (Physician Assistant)  Malgorzata Felder APRN as Nurse Practitioner (Behavioral Health)        Chief Complaint:    Chief Complaint   Patient presents with   • Depression   • Anxiety   • PTSD   • Bipolar Depression   • Med Management       History of Present Illness: Vicky Lowery is a 37 y.o. female who is here today for a medication management follow up. Patient reports that she is not sure if the medication is working. She states that she still feels depressed and endorses some suicidal thoughts recently. She also states she does not remember to take her medication at night time, so she has not been consistently taking the Latuda. She ran out of Wellbutrin so she has only been taking the 300 mg instead of the 450 mg. She states she might be manic one day but it rarely lasts longer than 24 hours and she is back to being depressed again. She denies any SI or HI today.     Subjective   Review of Systems:    Review of Systems   Psychiatric/Behavioral: Positive for suicidal ideas (history of, denies today ), depressed mood and stress. The patient is nervous/anxious.    All other systems reviewed and are negative.      Current Medications:   Current Outpatient Medications   Medication Sig Dispense Refill   • buPROPion XL (Wellbutrin XL) 300 MG 24 hr tablet Take 1 tablet by mouth Daily. 30 tablet 1   • diazePAM (Valium) 5 MG tablet Take 1 tablet by mouth Daily As Needed for Anxiety. 10 tablet 1   • Fremanezumab-vfrm (Ajovy) 225 MG/1.5ML solution auto-injector Inject 225 mg under the skin into the appropriate area as directed Every 30 (Thirty) Days. 1.5 mL 11   • hydroxychloroquine (PLAQUENIL) 200 MG tablet Take  by mouth Daily.     • lamoTRIgine (LaMICtal) 200 MG  tablet Take 1 tablet by mouth 2 (Two) Times a Day. 60 tablet 1   • predniSONE (DELTASONE) 10 MG tablet      • propranolol LA (INDERAL LA) 160 MG 24 hr capsule TAKE ONE CAPSULE BY MOUTH DAILY 90 capsule 1   • tiZANidine (ZANAFLEX) 4 MG tablet Take 1 tablet by mouth Every 8 (Eight) Hours As Needed for Muscle Spasms. 20 tablet 0   • ubrogepant (ubrogepant) 100 MG tablet Take 1 tablet by mouth 1 (One) Time As Needed (migraine) for up to 2 doses. Wait 2 hours before 2nd dose. 16 tablet 5   • vitamin D (ERGOCALCIFEROL) 1.25 MG (93221 UT) capsule capsule Take 50,000 Units by mouth 1 (One) Time Per Week.     • Lumateperone Tosylate 42 MG capsule Take 1 capsule by mouth Daily. 30 capsule 1     No current facility-administered medications for this visit.       Mental Status Exam:   Hygiene:   good  Cooperation:  Cooperative  Eye Contact:  Good  Psychomotor Behavior:  Appropriate  Affect:  Appropriate  Mood: depressed  Speech:  Normal  Thought Process:  Linear  Thought Content:  Normal and Mood congruent  Suicidal:  None  Homicidal:  None  Hallucinations:  None  Delusion:  None  Memory:  Intact  Orientation:  Person, Place, Time and Situation  Reliability:  fair  Insight:  Fair  Judgement:  Fair  Impulse Control:  Fair  Physical/Medical Issues:  Yes see chart     Objective   Vital Signs:   Wt (!) 182 kg (401 lb 3.2 oz)   BMI 66.76 kg/m²       Assessment / Plan    Diagnoses and all orders for this visit:    1. Bipolar depression (HCC) (Primary)  -     Lumateperone Tosylate 42 MG capsule; Take 1 capsule by mouth Daily.  Dispense: 30 capsule; Refill: 1    2. Bipolar II disorder (HCC)  -     Lumateperone Tosylate 42 MG capsule; Take 1 capsule by mouth Daily.  Dispense: 30 capsule; Refill: 1  -     lamoTRIgine (LaMICtal) 200 MG tablet; Take 1 tablet by mouth 2 (Two) Times a Day.  Dispense: 60 tablet; Refill: 1    3. MARGIE (generalized anxiety disorder)  -     buPROPion XL (Wellbutrin XL) 300 MG 24 hr tablet; Take 1 tablet by mouth  Daily.  Dispense: 30 tablet; Refill: 1  -     diazePAM (Valium) 5 MG tablet; Take 1 tablet by mouth Daily As Needed for Anxiety.  Dispense: 10 tablet; Refill: 1    4. Post traumatic stress disorder (PTSD)  -     buPROPion XL (Wellbutrin XL) 300 MG 24 hr tablet; Take 1 tablet by mouth Daily.  Dispense: 30 tablet; Refill: 1  -     lamoTRIgine (LaMICtal) 200 MG tablet; Take 1 tablet by mouth 2 (Two) Times a Day.  Dispense: 60 tablet; Refill: 1    5. Major depressive disorder, recurrent, mild (HCC)  -     buPROPion XL (Wellbutrin XL) 300 MG 24 hr tablet; Take 1 tablet by mouth Daily.  Dispense: 30 tablet; Refill: 1  -     lamoTRIgine (LaMICtal) 200 MG tablet; Take 1 tablet by mouth 2 (Two) Times a Day.  Dispense: 60 tablet; Refill: 1     Discontinue Latuda. Will start Caplyta. Decrease Wellbutrin to 300 mg daily and continue Valium and Lamictal as ordered.     A psychological evaluation was conducted in order to assess past and current level of functioning. Areas assessed included, but were not limited to: perception of social support, perception of ability to face and deal with challenges in life (positive functioning), anxiety symptoms, depressive symptoms, perspective on beliefs/belief system, coping skills for stress, intelligence level,  Therapeutic rapport was established. Interventions conducted today were geared towards incorporating medication management along with support for continued therapy. Education was also provided as to the med management with this provider and what to expect in subsequent sessions.      We discussed risks, benefits, goals and side effects of the above medication and the patient was agreeable with the plan. Patient was educated on the importance of compliance with treatment and follow-up appointments. Patient is aware to contact the [facility] with any worsening of symptoms. To call for questions or concerns and return early if necessary. Patent is agreeable to go to the Emergency  Department or call 911 should they begin SI/HI.    PHQ-2/PHQ-9: Depression Screening  Little Interest or Pleasure in Doing Things: 1-->several days  Feeling Down, Depressed or Hopeless: 2-->more than half the days  PHQ-2 Total Score: 3  Trouble Falling or Staying Asleep, or Sleeping Too Much: 2-->more than half the days  Feeling Tired or Having Little Energy: 3-->nearly every day  Poor Appetite or Overeatin-->not at all  Feeling Bad about Yourself - or that You are a Failure or Have Let Yourself or Your Family Down: 2-->more than half the days  Trouble Concentrating on Things, Such as Reading the Newspaper or Watching Television: 1-->several days  Moving or Speaking So Slowly that Other People Could Have Noticed? Or the Opposite - Being So Fidgety: 1-->several days  Thoughts that You Would be Better Off Dead or of Hurting Yourself in Some Way: 1-->several days  PHQ-9: Brief Depression Severity Measure Score: 13    PHQ-9 Score:   PHQ-9 Total Score: 13    Depression Screening:  Patient screened positive for depression based on a PHQ-9 score of 13 on 2022. Follow-up recommendations include: Prescribed antidepressant medication treatment and Suicide Risk Assessment performed.    Over the last two weeks, how often have you been bothered by the following problems?  Feeling nervous, anxious or on edge: Several days  Not being able to stop or control worrying: More than half the days  Worrying too much about different things: Several days  Trouble Relaxing: Several days  Being so restless that it is hard to sit still: Several days  Becoming easily annoyed or irritable: Several days  Feeling afraid as if something awful might happen: Several days  MARGIE 7 Total Score: 8  If you checked any problems, how difficult have these problems made it for you to do your work, take care of things at home, or get along with other people: Very difficult            MEDS ORDERED DURING VISIT:  New Medications Ordered This Visit    Medications   • Lumateperone Tosylate 42 MG capsule     Sig: Take 1 capsule by mouth Daily.     Dispense:  30 capsule     Refill:  1   • buPROPion XL (Wellbutrin XL) 300 MG 24 hr tablet     Sig: Take 1 tablet by mouth Daily.     Dispense:  30 tablet     Refill:  1   • diazePAM (Valium) 5 MG tablet     Sig: Take 1 tablet by mouth Daily As Needed for Anxiety.     Dispense:  10 tablet     Refill:  1   • lamoTRIgine (LaMICtal) 200 MG tablet     Sig: Take 1 tablet by mouth 2 (Two) Times a Day.     Dispense:  60 tablet     Refill:  1         Follow Up   Return in about 6 weeks (around 1/3/2023).  Patient was given instructions and counseling regarding her condition or for health maintenance advice. Please see specific information pulled into the AVS if appropriate.     TREATMENT PLAN/GOALS: Continue supportive psychotherapy efforts and medications as indicated. Treatment and medication options discussed during today's visit. Patient acknowledged and verbally consented to continue with current treatment plan and was educated on the importance of compliance with treatment and follow-up appointments.    MEDICATION ISSUES:  Discussed medication options and treatment plan of prescribed medication as well as the risks, benefits, and side effects including potential falls, possible impaired driving and metabolic adversities among others. Patient is agreeable to call the office with any worsening of symptoms or onset of side effects. Patient is agreeable to call 911 or go to the nearest ER should he/she begin having SI/HI.      FRANCK Sotelo PC [facility] [address] [address]  [phone]    November 22, 2022 15:46 EST

## 2022-12-09 ENCOUNTER — TELEPHONE (OUTPATIENT)
Dept: INTERNAL MEDICINE | Facility: CLINIC | Age: 37
End: 2022-12-09

## 2022-12-09 NOTE — TELEPHONE ENCOUNTER
Reference # [id number] calling because he got a response that eligibility could not be verified through his insurance.  Was calling to see if he had another insurance for eligibility.  I ran insurance and received an eligibility verification so he said that you can contact the insurance directly to initiate the PA by calling [phone]

## 2022-12-14 ENCOUNTER — PRIOR AUTHORIZATION (OUTPATIENT)
Dept: BEHAVIORAL HEALTH | Facility: CLINIC | Age: 37
End: 2022-12-14

## 2022-12-14 NOTE — TELEPHONE ENCOUNTER
PA SUBMITTED FOR CRISTOFER 42MG Y PHONE. THIS CAN TAKE UP TO 72 HOURS    CASE- [id number]    [facility] STATUS - 2     [email or web address]

## 2022-12-14 NOTE — TELEPHONE ENCOUNTER
PA SUBMITTED BY PHONE. DOCUMENTATION IN CHART. PT NAME IS NOT UP TO DATE WITH INSURANCE. I WILL CQALL PT AND LET HER KNOW.

## 2022-12-19 ENCOUNTER — HOSPITAL ENCOUNTER (EMERGENCY)
Facility: HOSPITAL | Age: 37
Discharge: HOME | End: 2022-12-19
Attending: EMERGENCY MEDICINE | Admitting: EMERGENCY MEDICINE

## 2022-12-19 ENCOUNTER — APPOINTMENT (OUTPATIENT)
Dept: GENERAL RADIOLOGY | Facility: HOSPITAL | Age: 37
End: 2022-12-19

## 2022-12-19 VITALS
OXYGEN SATURATION: 91 % | WEIGHT: 293 LBS | BODY MASS INDEX: 48.82 KG/M2 | SYSTOLIC BLOOD PRESSURE: 128 MMHG | DIASTOLIC BLOOD PRESSURE: 73 MMHG | TEMPERATURE: 98.3 F | HEIGHT: 65 IN | RESPIRATION RATE: 20 BRPM | HEART RATE: 80 BPM

## 2022-12-19 DIAGNOSIS — M25.551 ACUTE PAIN OF RIGHT HIP: Primary | ICD-10-CM

## 2022-12-19 PROCEDURE — 25010000002 TRIAMCINOLONE PER 10 MG: Performed by: EMERGENCY MEDICINE

## 2022-12-19 PROCEDURE — 99283 EMERGENCY DEPT VISIT LOW MDM: CPT

## 2022-12-19 PROCEDURE — 96372 THER/PROPH/DIAG INJ SC/IM: CPT

## 2022-12-19 PROCEDURE — 63710000001 ONDANSETRON PER 8 MG: Performed by: EMERGENCY MEDICINE

## 2022-12-19 PROCEDURE — 73502 X-RAY EXAM HIP UNI 2-3 VIEWS: CPT

## 2022-12-19 RX ORDER — METHOCARBAMOL 750 MG/1
750 TABLET, FILM COATED ORAL ONCE
Status: COMPLETED | OUTPATIENT
Start: 2022-12-19 | End: 2022-12-19

## 2022-12-19 RX ORDER — METHYLPREDNISOLONE 4 MG/1
TABLET ORAL
Qty: 21 TABLET | Refills: 0 | Status: SHIPPED | OUTPATIENT
Start: 2022-12-19

## 2022-12-19 RX ORDER — TRIAMCINOLONE ACETONIDE 40 MG/ML
80 INJECTION, SUSPENSION INTRA-ARTICULAR; INTRAMUSCULAR ONCE
Status: COMPLETED | OUTPATIENT
Start: 2022-12-19 | End: 2022-12-19

## 2022-12-19 RX ORDER — METHOCARBAMOL 750 MG/1
750 TABLET, FILM COATED ORAL 3 TIMES DAILY PRN
Qty: 20 TABLET | Refills: 0 | Status: SHIPPED | OUTPATIENT
Start: 2022-12-19

## 2022-12-19 RX ORDER — ONDANSETRON 4 MG/1
4 TABLET, FILM COATED ORAL ONCE
Status: COMPLETED | OUTPATIENT
Start: 2022-12-19 | End: 2022-12-19

## 2022-12-19 RX ORDER — HYDROCODONE BITARTRATE AND ACETAMINOPHEN 7.5; 325 MG/1; MG/1
1 TABLET ORAL ONCE
Status: COMPLETED | OUTPATIENT
Start: 2022-12-19 | End: 2022-12-19

## 2022-12-19 RX ADMIN — HYDROCODONE BITARTRATE AND ACETAMINOPHEN 1 TABLET: 7.5; 325 TABLET ORAL at 13:57

## 2022-12-19 RX ADMIN — TRIAMCINOLONE ACETONIDE 80 MG: 40 INJECTION, SUSPENSION INTRA-ARTICULAR; INTRAMUSCULAR at 14:20

## 2022-12-19 RX ADMIN — METHOCARBAMOL 750 MG: 750 TABLET ORAL at 13:57

## 2022-12-19 RX ADMIN — ONDANSETRON HYDROCHLORIDE 4 MG: 4 TABLET, FILM COATED ORAL at 13:57

## 2022-12-19 NOTE — ED PROVIDER NOTES
EMERGENCY DEPARTMENT ENCOUNTER    Pt Name: Vicky Lowery  MRN: [id number]  Pt :   1985  Room Number:    Date of encounter:  2022  PCP: José Miguel Dunn DO  ED Provider: FRANCK Ramirez    Historian: Patient      HPI:  Chief Complaint: Hip pain        Context: Vicky Lowery is a 37 y.o. female who presents to the ED c/o acute pain to the right hip onset this morning after ambulating back to bed after urinating earlier this morning.  Patient states that she turned and felt a pop in her right hip.  Now she has continued pain with weightbearing.  Patient states she has a history of osteoarthritis and rheumatoid arthritis.  She states that she takes prednisone on a regular basis is much as 10 mg as needed.  Her last occasion to take prednisone was 2 days ago.    Review of systems is negative for fever chills or recent illness.  Negative for chest pain or cough or shortness of breath.  GI and  systems are negative.  No profound weakness, dizziness or syncope.  No neurosensory complaint or focal weakness.      PAST MEDICAL HISTORY  Past Medical History:   Diagnosis Date   • Anxiety    • Anxiety    • Arthritis    • Asthma    • Back pain    • Bipolar disorder (HCC)    • Cervical disc disorder    • Chronic pain disorder     A very long time   • Depression    • Fibromyalgia, primary    • Kidney disease, chronic, stage II (mild, EGFR 60+ ml/min)    • Low back pain    • Migraine    • Mitral valve prolapse    • MRSA (methicillin resistant Staphylococcus aureus)    • Peripheral neuropathy    • Psychiatric illness ?    Not sure what's meant by that   • PTSD (post-traumatic stress disorder)    • Schizoaffective disorder (HCC) ?   • Self-injurious behavior 2010    Cutting   • Tattoo          PAST SURGICAL HISTORY  Past Surgical History:   Procedure Laterality Date   • EPIDURAL BLOCK     • FIBULA FRACTURE SURGERY  ,    • FRACTURE SURGERY     • MANDIBLE  FRACTURE SURGERY  2002   • TRIGGER POINT INJECTION     • WISDOM TOOTH EXTRACTION           FAMILY HISTORY  Family History   Problem Relation Age of Onset   • Breast cancer Mother    • Arthritis Mother    • Cancer Mother    • Diabetes Maternal Grandfather    • Hypertension Father    • Migraines Father    • Arthritis Maternal Grandmother    • Colon cancer Neg Hx          SOCIAL HISTORY  Social History     Socioeconomic History   • Marital status:    Tobacco Use   • Smoking status: Every Day     Packs/day: 0.50     Years: 3.00     Pack years: 1.50     Types: Cigarettes   • Smokeless tobacco: Never   Vaping Use   • Vaping Use: Never used   Substance and Sexual Activity   • Alcohol use: Yes     Alcohol/week: 1.0 standard drink     Types: 1 Drinks containing 0.5 oz of alcohol per week     Comment: Honestly not even that often   • Drug use: No   • Sexual activity: Yes     Partners: Male     Birth control/protection: None         ALLERGIES  Triptans and Nsaids        REVIEW OF SYSTEMS  Review of Systems     All systems reviewed and negative except for those discussed in HPI.       PHYSICAL EXAM    I have reviewed the triage vital signs and nursing notes.    ED Triage Vitals   Temp Heart Rate Resp BP SpO2   12/19/22 1224 12/19/22 1200 12/19/22 1224 12/19/22 1200 12/19/22 1200   98.3 °F (36.8 °C) 77 20 138/80 97 %      Temp src Heart Rate Source Patient Position BP Location FiO2 (%)   12/19/22 1224 12/19/22 1224 12/19/22 1224 12/19/22 1224 --   Oral Monitor Sitting Right arm        Physical Exam  GENERAL:   Appears in no acute distress.  Her vital signs are normal.  She is a good historian  HENT: Nares patent.  EYES: No scleral icterus.  CV: Regular rhythm, regular rate.  No tachycardia.  No peripheral edema  RESPIRATORY: Normal effort.  No audible wheezes, rales or rhonchi.  ABDOMEN: Soft, nontender  MUSCULOSKELETAL: No deformities.  Pelvis is stable.  She has 5 out of 5 strength in her lower extremities.  Her  presenting pain is reproduced with passive rotation at the right hip and with the palpation of the trochanteric musculature.  No spine tenderness.  Straight leg raise is negative  NEURO: Alert, moves all extremities, follows commands.  No neurosensory deficit or focal weakness  SKIN: Warm, dry, no rash visualized.      LAB RESULTS  No results found for this or any previous visit (from the past 24 hour(s)).    If labs were ordered, I independently reviewed the results and considered them in treating the patient.        RADIOLOGY  XR Hip With or Without Pelvis 2 - 3 View Right    Result Date: 12/19/2022  DATE OF EXAM: 12/19/2022 11:56 AM  PROCEDURE: XR HIP W OR WO PELVIS 2-3 VIEW RIGHT-  INDICATIONS: HIP PAIN  COMPARISON: No comparisons available.  TECHNIQUE: 3 images of the right hip including AP view the pelvis  FINDINGS: The right hip soft tissues are unremarkable. No acute fracture or malalignment. There is minimal degenerative change of the right hip joint with a tiny amount of subchondral cystic change and spurring of the superolateral acetabulum. No high-grade joint space loss. The pubic symphysis and sacroiliac joints are congruent.      No acute osseous findings.  This report was finalized on 12/19/2022 12:32 PM by Kash Haley MD.        PROCEDURES    Procedures    No orders to display       MEDICATIONS GIVEN IN ER    Medications   triamcinolone acetonide (KENALOG-40) injection 80 mg (80 mg Intramuscular Given 12/19/22 1420)   HYDROcodone-acetaminophen (NORCO) 7.5-325 MG per tablet 1 tablet (1 tablet Oral Given 12/19/22 1357)   ondansetron (ZOFRAN) tablet 4 mg (4 mg Oral Given 12/19/22 1357)   methocarbamol (ROBAXIN) tablet 750 mg (750 mg Oral Given 12/19/22 1357)       Orders placed during this visit:  Orders Placed This Encounter   Procedures   • XR Hip With or Without Pelvis 2 - 3 View Right         Additional orders considered but not ordered:    ED Course:    Consultants:                      AS OF  15:54 EST VITALS:    BP - 128/73  HR - 80  TEMP - 98.3 °F (36.8 °C) (Oral)  O2 SATS - 91%                  DIAGNOSIS  Final diagnoses:   Acute pain of right hip         DISPOSITION    DISCHARGE    Patient discharged in stable condition.    Reviewed implications of results, diagnosis, meds, responsibility to follow up, warning signs and symptoms of possible worsening, potential complications and reasons to return to ER.    Patient/Family voiced understanding of above instructions.    Discussed plan for discharge, as there is no emergent indication for admission.  Pt/family is agreeable and understands need for follow up and possible repeat testing.  Pt/family is aware that discharge does not mean that nothing is wrong but that it indicates no emergency is currently present that requires admission and they must continue care with follow-up as given below or with a physician of their choice.     FOLLOW-UP  José Miguel Dunn, DO  [address] [place] [address]   [place] [facility] [phone]    Schedule an appointment as soon as possible for a visit in 2 days  If symptoms worsen         Medication List      New Prescriptions    methocarbamol 750 MG tablet  Commonly known as: ROBAXIN  Take 1 tablet by mouth 3 (Three) Times a Day As Needed for Muscle Spasms.     methylPREDNISolone 4 MG dose pack  Commonly known as: MEDROL  Take as directed on package instructions.           Where to Get Your Medications      These medications were sent to [facility] [id number] - [place] [address] AT [facility] - [phone]  - [phone]   [address], [facility] [address]    Phone: [phone]   · methocarbamol 750 MG tablet  · methylPREDNISolone 4 MG dose pack             Please note that portions of this document were completed with voice recognition software.      Emilie Mojica, APRN  12/19/22 [id number]

## 2022-12-19 NOTE — DISCHARGE INSTRUCTIONS
Home to rest.  Maintain your very best hydration and nutrition.  Medications been forwarded to your personal pharmacy.  Follow-up with your primary care provider to monitor your recovery.  Return to the emergency department as needed for worsening symptoms or concerns.  Thank you

## 2022-12-28 ENCOUNTER — PRIOR AUTHORIZATION (OUTPATIENT)
Dept: NEUROLOGY | Facility: CLINIC | Age: 37
End: 2022-12-28

## 2022-12-28 ENCOUNTER — TELEPHONE (OUTPATIENT)
Dept: NEUROLOGY | Facility: CLINIC | Age: 37
End: 2022-12-28

## 2022-12-28 NOTE — TELEPHONE ENCOUNTER
"Spoke to pt and told her that the first PA request was not approved and informed her it was saying unable to verify. Pt stated it could be under last name, \"Hans\" as she is recently . This worked and the PA has been filled out and sent to plan. Advised pt to call insurance and straighten out name issues as it could cause issues for her in the future.  "

## 2022-12-28 NOTE — TELEPHONE ENCOUNTER
CHECKING ON THE STATUS OF PRIOR AUTH FOR JEAN PAUL. HAS BEEN A COUPLE OF MONTHS SINCE SENT IN.    NEEDS MORE MIGRAINE MEDICATION (UBREVELY) AND WONDERING IF SHE CAN HAVE SOME SAMPLES.     REQUESTING A CALL

## 2023-01-16 DIAGNOSIS — F41.1 GAD (GENERALIZED ANXIETY DISORDER): ICD-10-CM

## 2023-01-17 RX ORDER — DIAZEPAM 5 MG/1
TABLET ORAL
Qty: 10 TABLET | Refills: 0 | Status: SHIPPED | OUTPATIENT
Start: 2023-01-17 | End: 2023-02-21

## 2023-01-20 ENCOUNTER — PRIOR AUTHORIZATION (OUTPATIENT)
Dept: NEUROLOGY | Facility: CLINIC | Age: 38
End: 2023-01-20
Payer: MEDICARE

## 2023-02-02 ENCOUNTER — PRIOR AUTHORIZATION (OUTPATIENT)
Dept: INTERNAL MEDICINE | Facility: CLINIC | Age: 38
End: 2023-02-02
Payer: MEDICARE

## 2023-02-15 DIAGNOSIS — F41.1 GAD (GENERALIZED ANXIETY DISORDER): ICD-10-CM

## 2023-02-15 DIAGNOSIS — F33.0 MAJOR DEPRESSIVE DISORDER, RECURRENT, MILD: ICD-10-CM

## 2023-02-15 DIAGNOSIS — F43.10 POST TRAUMATIC STRESS DISORDER (PTSD): ICD-10-CM

## 2023-02-16 RX ORDER — BUPROPION HYDROCHLORIDE 300 MG/1
TABLET ORAL
Qty: 30 TABLET | Refills: 0 | Status: SHIPPED | OUTPATIENT
Start: 2023-02-16

## 2023-02-19 DIAGNOSIS — F41.1 GAD (GENERALIZED ANXIETY DISORDER): ICD-10-CM

## 2023-02-21 RX ORDER — DIAZEPAM 5 MG/1
TABLET ORAL
Qty: 10 TABLET | Refills: 0 | Status: SHIPPED | OUTPATIENT
Start: 2023-02-21

## 2023-02-21 NOTE — TELEPHONE ENCOUNTER
Rx Refill Note  Requested Prescriptions     Pending Prescriptions Disp Refills   • diazePAM (VALIUM) 5 MG tablet [Pharmacy Med Name: diazePAM 5 MG TABLET] 10 tablet      Sig: TAKE ONE TABLET BY MOUTH DAILY AS NEEDED FOR ANXIETY      Last office visit with prescribing clinician: 11/22/2022   Last telemedicine visit with prescribing clinician:   Next office visit with prescribing clinician: 4/13/2023     PRN  {    Valarie Tejada MA  02/21/23, 16:48 EST

## 2023-03-20 RX ORDER — GALCANEZUMAB 120 MG/ML
INJECTION, SOLUTION SUBCUTANEOUS
Qty: 2 ML | OUTPATIENT
Start: 2023-03-20

## 2023-03-20 NOTE — TELEPHONE ENCOUNTER
Hub to read:    Refill not appropriate. Script was for loading dose of emgality. Patient has Emgality script on file at [facility] with 11 refills.

## 2023-04-13 ENCOUNTER — TELEMEDICINE (OUTPATIENT)
Dept: BEHAVIORAL HEALTH | Facility: CLINIC | Age: 38
End: 2023-04-13
Payer: MEDICARE

## 2023-04-13 DIAGNOSIS — F31.81 BIPOLAR II DISORDER: Primary | ICD-10-CM

## 2023-04-13 DIAGNOSIS — F33.0 MAJOR DEPRESSIVE DISORDER, RECURRENT, MILD: ICD-10-CM

## 2023-04-13 DIAGNOSIS — F31.9 BIPOLAR DEPRESSION: ICD-10-CM

## 2023-04-13 DIAGNOSIS — F41.1 GAD (GENERALIZED ANXIETY DISORDER): ICD-10-CM

## 2023-04-13 DIAGNOSIS — F43.10 POST TRAUMATIC STRESS DISORDER (PTSD): ICD-10-CM

## 2023-04-13 PROCEDURE — 1159F MED LIST DOCD IN RCRD: CPT

## 2023-04-13 PROCEDURE — 99214 OFFICE O/P EST MOD 30 MIN: CPT

## 2023-04-13 PROCEDURE — 1160F RVW MEDS BY RX/DR IN RCRD: CPT

## 2023-04-13 RX ORDER — LAMOTRIGINE 200 MG/1
200 TABLET ORAL 2 TIMES DAILY
Qty: 60 TABLET | Refills: 1 | Status: SHIPPED | OUTPATIENT
Start: 2023-04-13

## 2023-04-13 RX ORDER — DIAZEPAM 5 MG/1
5 TABLET ORAL DAILY PRN
Qty: 10 TABLET | Refills: 0 | Status: SHIPPED | OUTPATIENT
Start: 2023-04-13

## 2023-04-13 RX ORDER — BUPROPION HYDROCHLORIDE 150 MG/1
150 TABLET ORAL EVERY MORNING
Qty: 30 TABLET | Refills: 1 | Status: SHIPPED | OUTPATIENT
Start: 2023-04-13

## 2023-04-13 NOTE — PROGRESS NOTES
This provider is located at [facility], [facility] ,[address], [address] in [place], [place],using a secure MyChart Video Visit through [employer]. Patient is being seen remotely via telehealth at their home address in [place], and stated they are in a secure environment for this session. The patient's condition being diagnosed/treated is appropriate for telemedicine. The provider identified herself as well as her credentials.   The patient, and/or patients guardian, consent to be seen remotely, and when consent is given they understand that the consent allows for patient identifiable information to be sent to a third party as needed.   They may refuse to be seen remotely at any time. The electronic data is encrypted and password protected, and the patient and/or guardian has been advised of the potential risks to privacy not withstanding such measures.    Video Visit    Patient Name: Vicky Lowery  : 1985   MRN: [id number]   Care Team: Patient Care Team:  José Miguel Dunn DO as PCP - General (Family Medicine)  Glenn Gonzales PA-C as Referring Physician (Physician Assistant)  Malgorzata Felder APRN as Nurse Practitioner (Behavioral Health)        Chief Complaint:    Chief Complaint   Patient presents with   • Anxiety   • Depression   • Bipolar   • PTSD   • Med Management       History of Present Illness: Vicky Lowery is a 38 y.o. female who presents via video visit for a medication management follow up. Patient reports that has been more irritable and agitated lately. She reports feeling more on edge most of the time. She has had some depressed moments lately, but feels that overall her biggest concern is her irritability. She denies SI/HI today.     The following portion of the patient's history were reviewed and updated appropriately: allergies, current and past medications, family history, medical history and social  history.  Subjective   Review of Systems:    Review of Systems   Psychiatric/Behavioral: Positive for agitation, depressed mood and stress. The patient is nervous/anxious.    All other systems reviewed and are negative.      Current Medications:   Current Outpatient Medications   Medication Sig Dispense Refill   • diazePAM (VALIUM) 5 MG tablet Take 1 tablet by mouth Daily As Needed for Anxiety. 10 tablet 0   • lamoTRIgine (LaMICtal) 200 MG tablet Take 1 tablet by mouth 2 (Two) Times a Day. 60 tablet 1   • Lumateperone Tosylate 42 MG capsule Take 1 capsule by mouth Daily. 30 capsule 1   • buPROPion XL (Wellbutrin XL) 150 MG 24 hr tablet Take 1 tablet by mouth Every Morning. 30 tablet 1   • galcanezumab-gnlm (EMGALITY) 120 MG/ML auto-injector pen Inject 1 mL under the skin into the appropriate area as directed Every 30 (Thirty) Days. 1.12 mL 11   • hydroxychloroquine (PLAQUENIL) 200 MG tablet Take  by mouth Daily.     • predniSONE (DELTASONE) 10 MG tablet      • propranolol LA (INDERAL LA) 160 MG 24 hr capsule TAKE ONE CAPSULE BY MOUTH DAILY 90 capsule 1   • tiZANidine (ZANAFLEX) 4 MG tablet Take 1 tablet by mouth Every 8 (Eight) Hours As Needed for Muscle Spasms. 20 tablet 0   • ubrogepant (ubrogepant) 100 MG tablet Take 1 tablet by mouth 1 (One) Time As Needed (migraine) for up to 2 doses. Wait 2 hours before 2nd dose. 16 tablet 5   • vitamin D (ERGOCALCIFEROL) 1.25 MG (72454 UT) capsule capsule Take 50,000 Units by mouth 1 (One) Time Per Week.       No current facility-administered medications for this visit.       Mental Status Exam:   Hygiene:   unable to assess  Cooperation:  Cooperative  Eye Contact:  Good  Psychomotor Behavior:  unable to assess  Affect:  Appropriate  Mood: depressed, anxious and irritable  Speech:  Normal  Thought Process:  Goal directed and Linear  Thought Content:  Normal and Mood congruent  Suicidal:  None  Homicidal:  None  Hallucinations:  None  Delusion:  None  Memory:   Intact  Orientation:  Person, Place, Time and Situation  Reliability:  good  Insight:  Good  Judgement:  Good  Impulse Control:  Good  Physical/Medical Issues:  Yes see chart     Objective   Vital Signs:   There were no vitals taken for this visit.      Assessment / Plan    Diagnoses and all orders for this visit:    1. Bipolar II disorder (Primary)  -     lamoTRIgine (LaMICtal) 200 MG tablet; Take 1 tablet by mouth 2 (Two) Times a Day.  Dispense: 60 tablet; Refill: 1  -     Lumateperone Tosylate 42 MG capsule; Take 1 capsule by mouth Daily.  Dispense: 30 capsule; Refill: 1    2. MARGIE (generalized anxiety disorder)  -     buPROPion XL (Wellbutrin XL) 150 MG 24 hr tablet; Take 1 tablet by mouth Every Morning.  Dispense: 30 tablet; Refill: 1  -     diazePAM (VALIUM) 5 MG tablet; Take 1 tablet by mouth Daily As Needed for Anxiety.  Dispense: 10 tablet; Refill: 0    3. Post traumatic stress disorder (PTSD)  -     buPROPion XL (Wellbutrin XL) 150 MG 24 hr tablet; Take 1 tablet by mouth Every Morning.  Dispense: 30 tablet; Refill: 1  -     lamoTRIgine (LaMICtal) 200 MG tablet; Take 1 tablet by mouth 2 (Two) Times a Day.  Dispense: 60 tablet; Refill: 1    4. Major depressive disorder, recurrent, mild  -     buPROPion XL (Wellbutrin XL) 150 MG 24 hr tablet; Take 1 tablet by mouth Every Morning.  Dispense: 30 tablet; Refill: 1  -     lamoTRIgine (LaMICtal) 200 MG tablet; Take 1 tablet by mouth 2 (Two) Times a Day.  Dispense: 60 tablet; Refill: 1    5. Bipolar depression  -     Lumateperone Tosylate 42 MG capsule; Take 1 capsule by mouth Daily.  Dispense: 30 capsule; Refill: 1    Will decrease Wellbutrin to 150 mg. Continue all other medication as ordered.     A psychological evaluation was conducted in order to assess past and current level of functioning. Areas assessed included, but were not limited to: perception of social support, perception of ability to face and deal with challenges in life (positive functioning),  anxiety symptoms, depressive symptoms, perspective on beliefs/belief system, coping skills for stress, intelligence level,  Therapeutic rapport was established. Interventions conducted today were geared towards incorporating medication management along with support for continued therapy. Education was also provided as to the med management with this provider and what to expect in subsequent sessions.      We discussed risks, benefits, goals and side effects of the above medication and the patient was agreeable with the plan. Patient was educated on the importance of compliance with treatment and follow-up appointments. Patient is aware to contact the [facility] with any worsening of symptoms. To call for questions or concerns and return early if necessary. Patent is agreeable to go to the Emergency Department or call 911 should they begin SI/HI.        MEDS ORDERED DURING VISIT:  New Medications Ordered This Visit   Medications   • buPROPion XL (Wellbutrin XL) 150 MG 24 hr tablet     Sig: Take 1 tablet by mouth Every Morning.     Dispense:  30 tablet     Refill:  1   • diazePAM (VALIUM) 5 MG tablet     Sig: Take 1 tablet by mouth Daily As Needed for Anxiety.     Dispense:  10 tablet     Refill:  0   • lamoTRIgine (LaMICtal) 200 MG tablet     Sig: Take 1 tablet by mouth 2 (Two) Times a Day.     Dispense:  60 tablet     Refill:  1   • Lumateperone Tosylate 42 MG capsule     Sig: Take 1 capsule by mouth Daily.     Dispense:  30 capsule     Refill:  1         Follow Up   Return in about 6 weeks (around 5/25/2023).  Patient was given instructions and counseling regarding her condition or for health maintenance advice. Please see specific information pulled into the AVS if appropriate.     TREATMENT PLAN/GOALS: Continue supportive psychotherapy efforts and medications as indicated. Treatment and medication options discussed during today's visit. Patient acknowledged and verbally consented to continue with current  treatment plan and was educated on the importance of compliance with treatment and follow-up appointments.    MEDICATION ISSUES:  Discussed medication options and treatment plan of prescribed medication as well as the risks, benefits, and side effects including potential falls, possible impaired driving and metabolic adversities among others. Patient is agreeable to call the office with any worsening of symptoms or onset of side effects. Patient is agreeable to call 911 or go to the nearest ER should he/she begin having SI/HI.        FRANCK Sotelo [facility] [address] [address]  [phone]    April 13, 2023 14:35 EDT

## 2023-04-17 ENCOUNTER — OFFICE VISIT (OUTPATIENT)
Dept: NEUROLOGY | Facility: CLINIC | Age: 38
End: 2023-04-17
Payer: MEDICARE

## 2023-04-17 VITALS
HEART RATE: 94 BPM | DIASTOLIC BLOOD PRESSURE: 74 MMHG | BODY MASS INDEX: 48.82 KG/M2 | OXYGEN SATURATION: 95 % | HEIGHT: 65 IN | SYSTOLIC BLOOD PRESSURE: 124 MMHG | WEIGHT: 293 LBS

## 2023-04-17 DIAGNOSIS — G43.719 INTRACTABLE CHRONIC MIGRAINE WITHOUT AURA AND WITHOUT STATUS MIGRAINOSUS: Primary | ICD-10-CM

## 2023-04-17 PROCEDURE — 1159F MED LIST DOCD IN RCRD: CPT | Performed by: PSYCHIATRY & NEUROLOGY

## 2023-04-17 PROCEDURE — 1160F RVW MEDS BY RX/DR IN RCRD: CPT | Performed by: PSYCHIATRY & NEUROLOGY

## 2023-04-17 PROCEDURE — 99214 OFFICE O/P EST MOD 30 MIN: CPT | Performed by: PSYCHIATRY & NEUROLOGY

## 2023-04-17 RX ORDER — SEMAGLUTIDE 1.34 MG/ML
INJECTION, SOLUTION SUBCUTANEOUS
COMMUNITY

## 2023-04-17 RX ORDER — PROPRANOLOL HYDROCHLORIDE 160 MG/1
160 CAPSULE, EXTENDED RELEASE ORAL DAILY
Qty: 90 CAPSULE | Refills: 1 | Status: SHIPPED | OUTPATIENT
Start: 2023-04-17

## 2023-04-17 NOTE — PROGRESS NOTES
Subjective:    CC: Vicky Lowery is in clinic today for follow up for history of intractable migraines.    HPI:  Initial visit: 9/14/22: Patient is a 37-year-old female with past medical history of intractable migraines, neck pain referred to clinic to establish care.  She was being followed by [facility] neurology at [place] and was seeing FRANCK Sapp for management of migraines.  She reports that overall migraines have remained under good control with on an average frequency of 1 migraine in a week however some weeks more, she may get 3-4 migraines in a week.  She reports it typically starts in the right occipital, right neck region with radiating pain up to the top of the head and sometimes around the right eye associated with light and sound sensitivity and sometimes nausea.  She reports that currently she is on propranolol long-acting 160 mg daily for migraine prevention.  She reports that she tried Topamax in the past many years ago which worked for some time then it stopped working.  She is currently using Ubrelvy as needed as an abortive treatment and seems to be working well.    Follow-up: 4/17/2023: She is in clinic for regular follow-up.  Since her initial visit in September 2022, she is now doing Emgality once a month injection in addition to Inderal long-acting 160 mg daily.  She reports excellent results with addition of Emgality and reports almost complete resolution of migraines.  She does not have to take any abortive treatment.  She is tolerating this combination well without any side effects.    The following portions of the patient's history were reviewed and updated as of 04/17/2023: allergies, social history and problem list.       Current Outpatient Medications:   •  buPROPion XL (Wellbutrin XL) 150 MG 24 hr tablet, Take 1 tablet by mouth Every Morning., Disp: 30 tablet, Rfl: 1  •  diazePAM (VALIUM) 5 MG tablet, Take 1 tablet by mouth Daily As Needed for Anxiety., Disp: 10  tablet, Rfl: 0  •  galcanezumab-gnlm (EMGALITY) 120 MG/ML auto-injector pen, Inject 1 mL under the skin into the appropriate area as directed Every 30 (Thirty) Days., Disp: 1.12 mL, Rfl: 11  •  hydroxychloroquine (PLAQUENIL) 200 MG tablet, Take  by mouth Daily., Disp: , Rfl:   •  lamoTRIgine (LaMICtal) 200 MG tablet, Take 1 tablet by mouth 2 (Two) Times a Day., Disp: 60 tablet, Rfl: 1  •  Lumateperone Tosylate 42 MG capsule, Take 1 capsule by mouth Daily., Disp: 30 capsule, Rfl: 1  •  predniSONE (DELTASONE) 10 MG tablet, , Disp: , Rfl:   •  propranolol LA (INDERAL LA) 160 MG 24 hr capsule, Take 1 capsule by mouth Daily., Disp: 90 capsule, Rfl: 1  •  Semaglutide, 1 MG/DOSE, (Ozempic, 1 MG/DOSE,) 4 MG/3ML solution pen-injector, Ozempic 1 mg/dose (4 mg/3 mL) subcutaneous pen injector, Disp: , Rfl:   •  tiZANidine (ZANAFLEX) 4 MG tablet, Take 1 tablet by mouth Every 8 (Eight) Hours As Needed for Muscle Spasms., Disp: 20 tablet, Rfl: 0  •  vitamin D (ERGOCALCIFEROL) 1.25 MG (49231 UT) capsule capsule, Take 1 capsule by mouth 1 (One) Time Per Week., Disp: , Rfl:    Past Medical History:   Diagnosis Date   • Anxiety    • Anxiety    • Arthritis    • Asthma    • Back pain    • Bipolar disorder    • Cervical disc disorder    • Chronic pain disorder     A very long time   • Depression    • Fibromyalgia, primary    • Kidney disease, chronic, stage II (mild, EGFR 60+ ml/min)    • Low back pain    • Migraine    • Mitral valve prolapse    • MRSA (methicillin resistant Staphylococcus aureus)    • Peripheral neuropathy    • Psychiatric illness 2010?    Not sure what's meant by that   • PTSD (post-traumatic stress disorder) 2017   • Schizoaffective disorder 2014?   • Self-injurious behavior 2010    Cutting   • Tattoo       Past Surgical History:   Procedure Laterality Date   • EPIDURAL BLOCK     • FIBULA FRACTURE SURGERY  2014, 2015   • FRACTURE SURGERY  2014   • MANDIBLE FRACTURE SURGERY  2002   • TRIGGER POINT INJECTION     •  "WISDOM TOOTH EXTRACTION        Family History   Problem Relation Age of Onset   • Breast cancer Mother    • Arthritis Mother    • Cancer Mother    • Diabetes Maternal Grandfather    • Hypertension Father    • Migraines Father    • Arthritis Maternal Grandmother    • Colon cancer Neg Hx         Review of Systems  Objective:    /74   Pulse 94   Ht 165.1 cm (65\")   Wt (!) 181 kg (400 lb)   SpO2 95%   BMI 66.56 kg/m²     Neurology Exam:  General apperance: NAD.     Mental status: Alert, awake and oriented to time place and person.    Recent and Remote memory: Can recall 3/3 objects at 5 minutes. Can recall historical events.     Attention span and Concentration: Serial 7s: Normal.     Fund of knowledge:  Normal.     Language and Speech: No aphasia or dysarthria.    Naming , Repitition and Comprehension:  Can name objects, repeat a sentence and follow commands. Speech is clear and fluent with good repetition, comprehension, and naming.    CN II to XII: Intact.    Opthalmoscopic Exam: No papilledema.    Motor:  Right UE muscle strength 5/5. Normal tone.     Left UE muscle strength 5/5. Normal tone.      Right LE muscle strength5/5. Normal tone.     Left LE muscle strength 5/5. Normal tone.      Sensory: Normal light touch, vibration and pinprick sensation bilaterally.    DTRs: 2+ bilaterally.    Babinski: Negative bilaterally.    Co-ordination: Normal finger-to-nose, heel to shin B/L.    Rhomberg: Negative.    Gait: Normal.    Cardiovascular: Regular rate and rhythm without murmur, gallop or rub.    Assessment and Plan:  1. Intractable chronic migraine without aura and without status migrainosus  She has responded really well to combination of Inderal long-acting 160 mg daily along with Emgality once a month injection with almost complete resolution of migraines.  Continue with this combination and I will see her back in clinic in 6 months for follow-up.  - propranolol LA (INDERAL LA) 160 MG 24 hr capsule; Take " 1 capsule by mouth Daily.  Dispense: 90 capsule; Refill: 1       I spent 30 minutes in patient care: Reviewing records prior to the visit, entering orders and documentation and spent more than vicente 50% of this time face-to-face in management, instructions and education regarding above mentioned diagnosis and also on counseling and discussing about taking medication regularly, possible side effects with medication use, importance of good sleep hygiene, good hydration and regular exercise.    Return in about 6 months (around 10/17/2023).       Note to patient: The 21st Century Cures Act makes medical notes like these available to patients in the interest of transparency. However, be advised this is a medical document. It is intended as peer to peer communication. It is written in medical language and may contain abbreviations or verbiage that are unfamiliar. It may appear blunt or direct. Medical documents are intended to carry relevant information, facts as evident, and the clinical opinion of the physician.

## 2023-05-15 DIAGNOSIS — F41.1 GAD (GENERALIZED ANXIETY DISORDER): ICD-10-CM

## 2023-05-15 RX ORDER — DIAZEPAM 5 MG/1
TABLET ORAL
Qty: 10 TABLET | Refills: 0 | Status: SHIPPED | OUTPATIENT
Start: 2023-05-15

## 2023-05-16 DIAGNOSIS — F31.81 BIPOLAR II DISORDER: ICD-10-CM

## 2023-05-16 DIAGNOSIS — F31.9 BIPOLAR DEPRESSION: ICD-10-CM

## 2023-05-16 RX ORDER — LUMATEPERONE 42 MG/1
CAPSULE ORAL
Qty: 30 CAPSULE | Refills: 1 | Status: SHIPPED | OUTPATIENT
Start: 2023-05-16

## 2023-05-30 ENCOUNTER — TELEMEDICINE (OUTPATIENT)
Dept: BEHAVIORAL HEALTH | Facility: CLINIC | Age: 38
End: 2023-05-30

## 2023-05-30 DIAGNOSIS — F43.10 POST TRAUMATIC STRESS DISORDER (PTSD): ICD-10-CM

## 2023-05-30 DIAGNOSIS — F41.1 GAD (GENERALIZED ANXIETY DISORDER): ICD-10-CM

## 2023-05-30 DIAGNOSIS — F31.81 BIPOLAR II DISORDER: Primary | ICD-10-CM

## 2023-05-30 DIAGNOSIS — F33.0 MAJOR DEPRESSIVE DISORDER, RECURRENT, MILD: ICD-10-CM

## 2023-05-30 DIAGNOSIS — F31.9 BIPOLAR DEPRESSION: ICD-10-CM

## 2023-05-30 PROCEDURE — 99214 OFFICE O/P EST MOD 30 MIN: CPT

## 2023-05-30 RX ORDER — LAMOTRIGINE 200 MG/1
200 TABLET ORAL 2 TIMES DAILY
Qty: 60 TABLET | Refills: 1 | Status: SHIPPED | OUTPATIENT
Start: 2023-05-30

## 2023-05-30 RX ORDER — LUMATEPERONE 42 MG/1
1 CAPSULE ORAL DAILY
Qty: 30 CAPSULE | Refills: 1 | Status: SHIPPED | OUTPATIENT
Start: 2023-05-30

## 2023-05-30 RX ORDER — DIAZEPAM 5 MG/1
5 TABLET ORAL DAILY PRN
Qty: 10 TABLET | Refills: 1 | Status: SHIPPED | OUTPATIENT
Start: 2023-05-30

## 2023-05-30 RX ORDER — BUPROPION HYDROCHLORIDE 150 MG/1
150 TABLET ORAL EVERY MORNING
Qty: 30 TABLET | Refills: 1 | Status: SHIPPED | OUTPATIENT
Start: 2023-05-30

## 2023-05-30 NOTE — PROGRESS NOTES
This provider is located at The [facility], Behavioral Health ,[address], [address] in [place], [place],using a secure MyChart Video Visit through [employer]. Patient is being seen remotely via telehealth at their home address in [place], and stated they are in a secure environment for this session. The patient's condition being diagnosed/treated is appropriate for telemedicine. The provider identified herself as well as her credentials.   The patient, and/or patients guardian, consent to be seen remotely, and when consent is given they understand that the consent allows for patient identifiable information to be sent to a third party as needed.   They may refuse to be seen remotely at any time. The electronic data is encrypted and password protected, and the patient and/or guardian has been advised of the potential risks to privacy not withstanding such measures.    Video Visit    Patient Name: Vicky Lowery  : 1985   MRN: [id number]   Care Team: Patient Care Team:  José Migule Dunn DO as PCP - General (Family Medicine)  Glenn Gonzales PA-C as Referring Physician (Physician Assistant)  Malgorzata Felder APRN as Nurse Practitioner (Behavioral Health)        Chief Complaint:    Chief Complaint   Patient presents with   • Bipolar   • Anxiety   • Depression   • PTSD   • Med Management       History of Present Illness: Vicky Lowery is a 38 y.o. female who presents via video visit for a medication management follow up. Patient reports that overall her irritability has decreased since decreasing the Wellbutrin. She states for the most part her other medication continues to be effective as well. She endorses some situational stressors lately, but feels that medication helps make them manageable. She recently found out she has cataracts and is working on figuring out a treatment plan for that. She did not see the need for any changes at this time  and did not have any medication concerns at today's visit. She denies SI/HI today.     The following portion of the patient's history were reviewed and updated appropriately: allergies, current and past medications, family history, medical history and social history.  Subjective   Review of Systems:    Review of Systems   Psychiatric/Behavioral: Positive for stress. The patient is nervous/anxious.    All other systems reviewed and are negative.      Current Medications:   Current Outpatient Medications   Medication Sig Dispense Refill   • buPROPion XL (Wellbutrin XL) 150 MG 24 hr tablet Take 1 tablet by mouth Every Morning. 30 tablet 1   • diazePAM (VALIUM) 5 MG tablet Take 1 tablet by mouth Daily As Needed for Anxiety. 10 tablet 1   • lamoTRIgine (LaMICtal) 200 MG tablet Take 1 tablet by mouth 2 (Two) Times a Day. 60 tablet 1   • Lumateperone Tosylate (Caplyta) 42 MG capsule Take 1 capsule by mouth Daily. 30 capsule 1   • galcanezumab-gnlm (EMGALITY) 120 MG/ML auto-injector pen Inject 1 mL under the skin into the appropriate area as directed Every 30 (Thirty) Days. 1.12 mL 11   • hydroxychloroquine (PLAQUENIL) 200 MG tablet Take  by mouth Daily.     • predniSONE (DELTASONE) 10 MG tablet      • propranolol LA (INDERAL LA) 160 MG 24 hr capsule Take 1 capsule by mouth Daily. 90 capsule 1   • Semaglutide, 1 MG/DOSE, (Ozempic, 1 MG/DOSE,) 4 MG/3ML solution pen-injector Ozempic 1 mg/dose (4 mg/3 mL) subcutaneous pen injector     • tiZANidine (ZANAFLEX) 4 MG tablet Take 1 tablet by mouth Every 8 (Eight) Hours As Needed for Muscle Spasms. 20 tablet 0   • vitamin D (ERGOCALCIFEROL) 1.25 MG (09670 UT) capsule capsule Take 1 capsule by mouth 1 (One) Time Per Week.       No current facility-administered medications for this visit.       Mental Status Exam:   Hygiene:   unable to assess  Cooperation:  Cooperative  Eye Contact:  Good  Psychomotor Behavior:  unable to assess  Affect:  Appropriate  Mood: normal, anxious and  stressed  Speech:  Normal  Thought Process:  Goal directed and Linear  Thought Content:  Normal and Mood congruent  Suicidal:  None  Homicidal:  None  Hallucinations:  None  Delusion:  None  Memory:  Intact  Orientation:  Person, Place, Time and Situation  Reliability:  good  Insight:  Good  Judgement:  Good  Impulse Control:  Good  Physical/Medical Issues:  Yes see chart     Objective   Vital Signs:   There were no vitals taken for this visit.      Assessment / Plan    Diagnoses and all orders for this visit:    1. Bipolar II disorder (Primary)  -     Lumateperone Tosylate (Caplyta) 42 MG capsule; Take 1 capsule by mouth Daily.  Dispense: 30 capsule; Refill: 1  -     lamoTRIgine (LaMICtal) 200 MG tablet; Take 1 tablet by mouth 2 (Two) Times a Day.  Dispense: 60 tablet; Refill: 1    2. MARGIE (generalized anxiety disorder)  -     buPROPion XL (Wellbutrin XL) 150 MG 24 hr tablet; Take 1 tablet by mouth Every Morning.  Dispense: 30 tablet; Refill: 1  -     diazePAM (VALIUM) 5 MG tablet; Take 1 tablet by mouth Daily As Needed for Anxiety.  Dispense: 10 tablet; Refill: 1    3. Post traumatic stress disorder (PTSD)  -     buPROPion XL (Wellbutrin XL) 150 MG 24 hr tablet; Take 1 tablet by mouth Every Morning.  Dispense: 30 tablet; Refill: 1  -     lamoTRIgine (LaMICtal) 200 MG tablet; Take 1 tablet by mouth 2 (Two) Times a Day.  Dispense: 60 tablet; Refill: 1    4. Major depressive disorder, recurrent, mild  -     buPROPion XL (Wellbutrin XL) 150 MG 24 hr tablet; Take 1 tablet by mouth Every Morning.  Dispense: 30 tablet; Refill: 1  -     lamoTRIgine (LaMICtal) 200 MG tablet; Take 1 tablet by mouth 2 (Two) Times a Day.  Dispense: 60 tablet; Refill: 1    5. Bipolar depression  -     Lumateperone Tosylate (Caplyta) 42 MG capsule; Take 1 capsule by mouth Daily.  Dispense: 30 capsule; Refill: 1      Patient appears to be doing well on current medication. No issues reported and no indication for change. Will continue medication  as ordered.     A psychological evaluation was conducted in order to assess past and current level of functioning. Areas assessed included, but were not limited to: perception of social support, perception of ability to face and deal with challenges in life (positive functioning), anxiety symptoms, depressive symptoms, perspective on beliefs/belief system, coping skills for stress, intelligence level,  Therapeutic rapport was established. Interventions conducted today were geared towards incorporating medication management along with support for continued therapy. Education was also provided as to the med management with this provider and what to expect in subsequent sessions.      We discussed risks, benefits, goals and side effects of the above medication and the patient was agreeable with the plan. Patient was educated on the importance of compliance with treatment and follow-up appointments. Patient is aware to contact the [facility] with any worsening of symptoms. To call for questions or concerns and return early if necessary. Patent is agreeable to go to the Emergency Department or call 911 should they begin SI/HI.        MEDS ORDERED DURING VISIT:  New Medications Ordered This Visit   Medications   • buPROPion XL (Wellbutrin XL) 150 MG 24 hr tablet     Sig: Take 1 tablet by mouth Every Morning.     Dispense:  30 tablet     Refill:  1   • Lumateperone Tosylate (Caplyta) 42 MG capsule     Sig: Take 1 capsule by mouth Daily.     Dispense:  30 capsule     Refill:  1   • lamoTRIgine (LaMICtal) 200 MG tablet     Sig: Take 1 tablet by mouth 2 (Two) Times a Day.     Dispense:  60 tablet     Refill:  1   • diazePAM (VALIUM) 5 MG tablet     Sig: Take 1 tablet by mouth Daily As Needed for Anxiety.     Dispense:  10 tablet     Refill:  1         Follow Up   Return in about 8 weeks (around 7/25/2023).  Patient was given instructions and counseling regarding her condition or for health maintenance advice. Please see  specific information pulled into the AVS if appropriate.     TREATMENT PLAN/GOALS: Continue supportive psychotherapy efforts and medications as indicated. Treatment and medication options discussed during today's visit. Patient acknowledged and verbally consented to continue with current treatment plan and was educated on the importance of compliance with treatment and follow-up appointments.    MEDICATION ISSUES:  Discussed medication options and treatment plan of prescribed medication as well as the risks, benefits, and side effects including potential falls, possible impaired driving and metabolic adversities among others. Patient is agreeable to call the office with any worsening of symptoms or onset of side effects. Patient is agreeable to call 911 or go to the nearest ER should he/she begin having SI/HI.        FRANCK Sotelo PC BE[facility] BEHAVIORAL HEALTH [address] [address] [phone]    May 31, 2023 12:51 EDT

## 2023-06-19 DIAGNOSIS — F33.0 MAJOR DEPRESSIVE DISORDER, RECURRENT, MILD: ICD-10-CM

## 2023-06-19 DIAGNOSIS — F41.1 GAD (GENERALIZED ANXIETY DISORDER): ICD-10-CM

## 2023-06-19 DIAGNOSIS — F31.81 BIPOLAR II DISORDER: ICD-10-CM

## 2023-06-19 DIAGNOSIS — F43.10 POST TRAUMATIC STRESS DISORDER (PTSD): ICD-10-CM

## 2023-06-19 RX ORDER — LAMOTRIGINE 200 MG/1
TABLET ORAL
Qty: 60 TABLET | Refills: 1 | OUTPATIENT
Start: 2023-06-19

## 2023-06-19 RX ORDER — BUPROPION HYDROCHLORIDE 150 MG/1
TABLET ORAL
Qty: 30 TABLET | Refills: 1 | OUTPATIENT
Start: 2023-06-19

## 2023-08-28 ENCOUNTER — TELEMEDICINE (OUTPATIENT)
Dept: BEHAVIORAL HEALTH | Facility: CLINIC | Age: 38
End: 2023-08-28
Payer: MEDICARE

## 2023-08-28 DIAGNOSIS — F43.10 POST TRAUMATIC STRESS DISORDER (PTSD): ICD-10-CM

## 2023-08-28 DIAGNOSIS — F33.0 MAJOR DEPRESSIVE DISORDER, RECURRENT, MILD: ICD-10-CM

## 2023-08-28 DIAGNOSIS — F41.1 GAD (GENERALIZED ANXIETY DISORDER): ICD-10-CM

## 2023-08-28 DIAGNOSIS — F31.81 BIPOLAR II DISORDER: ICD-10-CM

## 2023-08-28 DIAGNOSIS — F31.9 BIPOLAR DEPRESSION: ICD-10-CM

## 2023-08-28 PROCEDURE — 1160F RVW MEDS BY RX/DR IN RCRD: CPT

## 2023-08-28 PROCEDURE — 1159F MED LIST DOCD IN RCRD: CPT

## 2023-08-28 PROCEDURE — 99214 OFFICE O/P EST MOD 30 MIN: CPT

## 2023-08-28 RX ORDER — LUMATEPERONE 42 MG/1
1 CAPSULE ORAL DAILY
Qty: 30 CAPSULE | Refills: 2 | Status: SHIPPED | OUTPATIENT
Start: 2023-08-28

## 2023-08-28 RX ORDER — DIAZEPAM 5 MG/1
5 TABLET ORAL DAILY PRN
Qty: 10 TABLET | Refills: 2 | Status: SHIPPED | OUTPATIENT
Start: 2023-08-28

## 2023-08-28 RX ORDER — LAMOTRIGINE 200 MG/1
200 TABLET ORAL 2 TIMES DAILY
Qty: 60 TABLET | Refills: 2 | Status: SHIPPED | OUTPATIENT
Start: 2023-08-28

## 2023-08-28 RX ORDER — BUPROPION HYDROCHLORIDE 150 MG/1
150 TABLET ORAL EVERY MORNING
Qty: 30 TABLET | Refills: 2 | Status: SHIPPED | OUTPATIENT
Start: 2023-08-28

## 2023-08-28 NOTE — PROGRESS NOTES
This provider is located at The [facility], Behavioral Health ,[address], [address] in [place], [place],using a secure MyChart Video Visit through [employer] Patient is being seen remotely via telehealth at their home address in [place], and stated they are in a secure environment for this session. The patient's condition being diagnosed/treated is appropriate for telemedicine. The provider identified herself as well as her credentials.   The patient, and/or patients guardian, consent to be seen remotely, and when consent is given they understand that the consent allows for patient identifiable information to be sent to a third party as needed.   They may refuse to be seen remotely at any time. The electronic data is encrypted and password protected, and the patient and/or guardian has been advised of the potential risks to privacy not withstanding such measures.    Video Visit    Patient Name: Vicky Lowery  : 1985   MRN: [id number]   Care Team: Patient Care Team:  José Miguel Dunn DO as PCP - General (Family Medicine)  Glenn Gonzales PA-C as Referring Physician (Physician Assistant)  Malgorzata Felder APRN as Nurse Practitioner (Behavioral Health)         Chief Complaint:    Chief Complaint   Patient presents with    Anxiety    Depression    Bipolar    PTSD    Med Management       History of Present Illness: Vicky Lowery is a 38 y.o. female who presents via video visit for a medication management follow up. Patient reports that overall medication continues to be effective. She reports still having an occasional day where she needs the Valium to help calm her down, but feel that those days are few and far between. She feels that for the most part her anxiety and mood are managed well. She did not see the need to make any changes and did not have any medication concerns at today's visit. She denies SI/HI today.     The following portion of  the patient's history were reviewed and updated appropriately: allergies, current and past medications, family history, medical history and social history.  Subjective   Review of Systems:    Review of Systems   All other systems reviewed and are negative.    Current Medications:   Current Outpatient Medications   Medication Sig Dispense Refill    buPROPion XL (Wellbutrin XL) 150 MG 24 hr tablet Take 1 tablet by mouth Every Morning. 30 tablet 2    diazePAM (VALIUM) 5 MG tablet Take 1 tablet by mouth Daily As Needed for Anxiety. 10 tablet 2    lamoTRIgine (LaMICtal) 200 MG tablet Take 1 tablet by mouth 2 (Two) Times a Day. 60 tablet 2    Lumateperone Tosylate (Caplyta) 42 MG capsule Take 1 capsule by mouth Daily. 30 capsule 2    galcanezumab-gnlm (EMGALITY) 120 MG/ML auto-injector pen Inject 1 mL under the skin into the appropriate area as directed Every 30 (Thirty) Days. 1.12 mL 11    hydroxychloroquine (PLAQUENIL) 200 MG tablet Take  by mouth Daily.      predniSONE (DELTASONE) 10 MG tablet       propranolol LA (INDERAL LA) 160 MG 24 hr capsule Take 1 capsule by mouth Daily. 90 capsule 1    Semaglutide, 1 MG/DOSE, (Ozempic, 1 MG/DOSE,) 4 MG/3ML solution pen-injector Ozempic 1 mg/dose (4 mg/3 mL) subcutaneous pen injector      tiZANidine (ZANAFLEX) 4 MG tablet Take 1 tablet by mouth Every 8 (Eight) Hours As Needed for Muscle Spasms. 20 tablet 0    vitamin D (ERGOCALCIFEROL) 1.25 MG (95494 UT) capsule capsule Take 1 capsule by mouth 1 (One) Time Per Week.       No current facility-administered medications for this visit.       Mental Status Exam:   Hygiene:    unable to assess  Cooperation:  Cooperative  Eye Contact:  Good  Psychomotor Behavior:   unable to assess  Affect:  Appropriate  Mood: normal  Speech:  Normal  Thought Process:  Goal directed and Linear  Thought Content:  Normal and Mood congruent  Suicidal:  None  Homicidal:  None  Hallucinations:  None  Delusion:  None  Memory:  Intact  Orientation:  Person,  Place, Time, and Situation  Reliability:  good  Insight:  Good  Judgement:  Good  Impulse Control:  Good  Physical/Medical Issues:  Yes see chart      Objective   Vital Signs:   There were no vitals taken for this visit.      Assessment / Plan    Diagnoses and all orders for this visit:    1. Major depressive disorder, recurrent, mild  -     buPROPion XL (Wellbutrin XL) 150 MG 24 hr tablet; Take 1 tablet by mouth Every Morning.  Dispense: 30 tablet; Refill: 2  -     lamoTRIgine (LaMICtal) 200 MG tablet; Take 1 tablet by mouth 2 (Two) Times a Day.  Dispense: 60 tablet; Refill: 2    2. MARGIE (generalized anxiety disorder)  -     buPROPion XL (Wellbutrin XL) 150 MG 24 hr tablet; Take 1 tablet by mouth Every Morning.  Dispense: 30 tablet; Refill: 2  -     diazePAM (VALIUM) 5 MG tablet; Take 1 tablet by mouth Daily As Needed for Anxiety.  Dispense: 10 tablet; Refill: 2    3. Post traumatic stress disorder (PTSD)  -     buPROPion XL (Wellbutrin XL) 150 MG 24 hr tablet; Take 1 tablet by mouth Every Morning.  Dispense: 30 tablet; Refill: 2  -     lamoTRIgine (LaMICtal) 200 MG tablet; Take 1 tablet by mouth 2 (Two) Times a Day.  Dispense: 60 tablet; Refill: 2    4. Bipolar II disorder  -     lamoTRIgine (LaMICtal) 200 MG tablet; Take 1 tablet by mouth 2 (Two) Times a Day.  Dispense: 60 tablet; Refill: 2  -     Lumateperone Tosylate (Caplyta) 42 MG capsule; Take 1 capsule by mouth Daily.  Dispense: 30 capsule; Refill: 2    5. Bipolar depression  -     Lumateperone Tosylate (Caplyta) 42 MG capsule; Take 1 capsule by mouth Daily.  Dispense: 30 capsule; Refill: 2    Overall, patient appears to be doing well on current medication. No issues reported and no indication for change. Will continue medication as ordered.     A psychological evaluation was conducted in order to assess past and current level of functioning. Areas assessed included, but were not limited to: perception of social support, perception of ability to face and  deal with challenges in life (positive functioning), anxiety symptoms, depressive symptoms, perspective on beliefs/belief system, coping skills for stress, intelligence level,  Therapeutic rapport was established. Interventions conducted today were geared towards incorporating medication management along with support for continued therapy. Education was also provided as to the med management with this provider and what to expect in subsequent sessions.      We discussed risks, benefits, goals and side effects of the above medication and the patient was agreeable with the plan. Patient was educated on the importance of compliance with treatment and follow-up appointments. Patient is aware to contact the [facility] with any worsening of symptoms. To call for questions or concerns and return early if necessary. Patent is agreeable to go to the Emergency Department or call 911 should they begin SI/HI.        MEDS ORDERED DURING VISIT:  New Medications Ordered This Visit   Medications    buPROPion XL (Wellbutrin XL) 150 MG 24 hr tablet     Sig: Take 1 tablet by mouth Every Morning.     Dispense:  30 tablet     Refill:  2    diazePAM (VALIUM) 5 MG tablet     Sig: Take 1 tablet by mouth Daily As Needed for Anxiety.     Dispense:  10 tablet     Refill:  2    lamoTRIgine (LaMICtal) 200 MG tablet     Sig: Take 1 tablet by mouth 2 (Two) Times a Day.     Dispense:  60 tablet     Refill:  2    Lumateperone Tosylate (Caplyta) 42 MG capsule     Sig: Take 1 capsule by mouth Daily.     Dispense:  30 capsule     Refill:  2         Follow Up   Return in about 3 months (around 11/28/2023).  Patient was given instructions and counseling regarding her condition or for health maintenance advice. Please see specific information pulled into the AVS if appropriate.     TREATMENT PLAN/GOALS: Continue supportive psychotherapy efforts and medications as indicated. Treatment and medication options discussed during today's visit. Patient  acknowledged and verbally consented to continue with current treatment plan and was educated on the importance of compliance with treatment and follow-up appointments.    MEDICATION ISSUES:  Discussed medication options and treatment plan of prescribed medication as well as the risks, benefits, and side effects including potential falls, possible impaired driving and metabolic adversities among others. Patient is agreeable to call the office with any worsening of symptoms or onset of side effects. Patient is agreeable to call 911 or go to the nearest ER should he/she begin having SI/HI.        FRANCK Sotelo PC BE[facility] BEHAVIORAL HEALTH  [address] [place] [address] DR ANNELIESE SALAS [id number]  [phone]    August 28, 2023 13:43 EDT

## 2023-10-06 ENCOUNTER — TELEPHONE (OUTPATIENT)
Dept: NEUROLOGY | Facility: CLINIC | Age: 38
End: 2023-10-06
Payer: MEDICARE

## 2023-10-06 NOTE — TELEPHONE ENCOUNTER
LEFT VOICE MAIL INFORMING PATIENT THAT [facility] IS NOW OUT OF NETWORK WITH THE [facility] MEDICARE REPLACEMENT PLANS. PATIENT ADVISED TO CALL [employer] TO VERIFY IF THEY HAVE OUT OF NETWORK BENEFITS.

## 2023-10-19 ENCOUNTER — TELEPHONE (OUTPATIENT)
Dept: NEUROLOGY | Facility: CLINIC | Age: 38
End: 2023-10-19
Payer: MEDICARE

## 2023-10-19 NOTE — TELEPHONE ENCOUNTER
Patient requested appt. Called to schedule appt and discussed [employer] being non-par with [employer]. She is going to call and see if she has [employer] benefits then call back to reschedule.    Patient is requesting a refill of Emgality.    Maty MATTHEWS

## 2023-10-21 DIAGNOSIS — G43.719 INTRACTABLE CHRONIC MIGRAINE WITHOUT AURA AND WITHOUT STATUS MIGRAINOSUS: ICD-10-CM

## 2023-10-23 RX ORDER — PROPRANOLOL HYDROCHLORIDE 160 MG/1
160 CAPSULE, EXTENDED RELEASE ORAL DAILY
Qty: 90 CAPSULE | Refills: 0 | Status: SHIPPED | OUTPATIENT
Start: 2023-10-23

## 2023-10-23 NOTE — TELEPHONE ENCOUNTER
Rx Refill Note  Requested Prescriptions     Pending Prescriptions Disp Refills    propranolol LA (INDERAL LA) 160 MG 24 hr capsule [Pharmacy Med Name: PROPRANOLOL  MG CAPSULE] 90 capsule 1     Sig: TAKE ONE CAPSULE BY MOUTH DAILY      Last filled: 4/17/23 for 6 months     Last office visit with prescribing clinician: 4/17/2023      Next office visit with prescribing clinician: Visit date not found  --> added note to sig pt needs appt for further refills     Pt may not have [employer] benefits, looks like she was asked to check but I don't think we have heard back from her. Sending 90 days with 0 refills in case.     You Acosta MA  10/23/23, 08:49 EDT

## 2024-01-16 DIAGNOSIS — G43.719 INTRACTABLE CHRONIC MIGRAINE WITHOUT AURA AND WITHOUT STATUS MIGRAINOSUS: ICD-10-CM

## 2024-01-17 RX ORDER — PROPRANOLOL HYDROCHLORIDE 160 MG/1
160 CAPSULE, EXTENDED RELEASE ORAL DAILY
Qty: 90 CAPSULE | Refills: 1 | Status: SHIPPED | OUTPATIENT
Start: 2024-01-17

## 2024-01-17 NOTE — TELEPHONE ENCOUNTER
Rx Refill Note  Requested Prescriptions     Pending Prescriptions Disp Refills    propranolol LA (INDERAL LA) 160 MG 24 hr capsule [Pharmacy Med Name: PROPRANOLOL  MG CAPSULE] 90 capsule 0     Sig: TAKE 1 CAPSULE BY MOUTH DAILY **NEEDS APPOINTMENT FOR FURTHER REFILLS**      Last filled: 10/23/23 90 days + 0 refills   Last office visit with prescribing clinician: 4/17/2023      Next office visit with prescribing clinician: 4/15/2024     You Acosta MA  01/17/24, 10:57 EST

## 2024-02-19 DIAGNOSIS — F41.1 GAD (GENERALIZED ANXIETY DISORDER): ICD-10-CM

## 2024-02-19 RX ORDER — DIAZEPAM 5 MG/1
5 TABLET ORAL DAILY PRN
Qty: 10 TABLET | OUTPATIENT
Start: 2024-02-19

## 2024-02-23 DIAGNOSIS — F33.0 MAJOR DEPRESSIVE DISORDER, RECURRENT, MILD: ICD-10-CM

## 2024-02-23 DIAGNOSIS — F43.10 POST TRAUMATIC STRESS DISORDER (PTSD): ICD-10-CM

## 2024-02-23 DIAGNOSIS — F41.1 GAD (GENERALIZED ANXIETY DISORDER): ICD-10-CM

## 2024-02-26 RX ORDER — BUPROPION HYDROCHLORIDE 150 MG/1
150 TABLET ORAL EVERY MORNING
Qty: 30 TABLET | Refills: 2 | OUTPATIENT
Start: 2024-02-26

## 2024-03-05 DIAGNOSIS — F31.9 BIPOLAR DEPRESSION: ICD-10-CM

## 2024-03-05 DIAGNOSIS — F31.81 BIPOLAR II DISORDER: ICD-10-CM

## 2024-03-05 RX ORDER — LUMATEPERONE 42 MG/1
1 CAPSULE ORAL DAILY
Qty: 30 CAPSULE | Refills: 2 | OUTPATIENT
Start: 2024-03-05

## 2024-03-05 NOTE — TELEPHONE ENCOUNTER
Rx Refill Note  Requested Prescriptions     Pending Prescriptions Disp Refills    Caplyta 42 MG capsule [Pharmacy Med Name: CAPLYTA 42 MG CAPSULE] 30 capsule 2     Sig: TAKE ONE CAPSULE BY MOUTH DAILY      Last office visit with prescribing clinician: 11/22/2022   Last telemedicine visit with prescribing clinician: Visit date not found   Next office visit with prescribing clinician: Visit date not found    Ok to fill?        Joanne Colvin MA  03/05/24, 09:46 EST

## 2024-04-06 DIAGNOSIS — F31.9 BIPOLAR DEPRESSION: ICD-10-CM

## 2024-04-06 DIAGNOSIS — F41.1 GAD (GENERALIZED ANXIETY DISORDER): ICD-10-CM

## 2024-04-06 DIAGNOSIS — F31.81 BIPOLAR II DISORDER: ICD-10-CM

## 2024-04-06 RX ORDER — DIAZEPAM 5 MG/1
5 TABLET ORAL DAILY PRN
Qty: 10 TABLET | Refills: 0 | Status: SHIPPED | OUTPATIENT
Start: 2024-04-06

## 2024-04-06 RX ORDER — LUMATEPERONE 42 MG/1
1 CAPSULE ORAL DAILY
Qty: 30 CAPSULE | Refills: 2 | Status: SHIPPED | OUTPATIENT
Start: 2024-04-06

## 2024-04-15 ENCOUNTER — OFFICE VISIT (OUTPATIENT)
Dept: NEUROLOGY | Facility: CLINIC | Age: 39
End: 2024-04-15
Payer: MEDICARE

## 2024-04-15 VITALS
DIASTOLIC BLOOD PRESSURE: 88 MMHG | SYSTOLIC BLOOD PRESSURE: 122 MMHG | BODY MASS INDEX: 48.82 KG/M2 | HEART RATE: 82 BPM | HEIGHT: 65 IN | OXYGEN SATURATION: 98 % | WEIGHT: 293 LBS

## 2024-04-15 DIAGNOSIS — G43.719 INTRACTABLE CHRONIC MIGRAINE WITHOUT AURA AND WITHOUT STATUS MIGRAINOSUS: Primary | ICD-10-CM

## 2024-04-15 DIAGNOSIS — M54.16 LUMBAR RADICULOPATHY, CHRONIC: ICD-10-CM

## 2024-04-15 PROCEDURE — 1160F RVW MEDS BY RX/DR IN RCRD: CPT | Performed by: PSYCHIATRY & NEUROLOGY

## 2024-04-15 PROCEDURE — 99214 OFFICE O/P EST MOD 30 MIN: CPT | Performed by: PSYCHIATRY & NEUROLOGY

## 2024-04-15 PROCEDURE — 1159F MED LIST DOCD IN RCRD: CPT | Performed by: PSYCHIATRY & NEUROLOGY

## 2024-04-15 RX ORDER — HYDROCHLOROTHIAZIDE 25 MG/1
25 TABLET ORAL DAILY
COMMUNITY

## 2024-04-15 NOTE — PROGRESS NOTES
Subjective:    CC: Vicky Lowery is in clinic today for follow up for intractable chronic migraines new complaint of low back pain with lumbar radicular pain shooting down to right lateral thigh and right foot.    HPI:  Initial visit: 9/14/22: Patient is a 37-year-old female with past medical history of intractable migraines, neck pain referred to clinic to establish care.  She was being followed by [facility] neurology at [place] and was seeing FRANCK Sapp for management of migraines.  She reports that overall migraines have remained under good control with on an average frequency of 1 migraine in a week however some weeks more, she may get 3-4 migraines in a week.  She reports it typically starts in the right occipital, right neck region with radiating pain up to the top of the head and sometimes around the right eye associated with light and sound sensitivity and sometimes nausea.  She reports that currently she is on propranolol long-acting 160 mg daily for migraine prevention.  She reports that she tried Topamax in the past many years ago which worked for some time then it stopped working.  She is currently using Ubrelvy as needed as an abortive treatment and seems to be working well.    Follow-up: 4/17/2023: She is in clinic for regular follow-up.  Since her initial visit in September 2022, she is now doing Emgality once a month injection in addition to Inderal long-acting 160 mg daily.  She reports excellent results with addition of Emgality and reports almost complete resolution of migraines.  She does not have to take any abortive treatment.  She is tolerating this combination well without any side effects.    Follow-up: 4/15/2024: She is in clinic for regular follow-up.  Since her last visit 1 year ago, she reports that migraines remain under good control with use of Emgality once a month injection and propranolol long-acting 160 mg daily.  She reports that however she has been having low  back pain shooting go to the right lateral leg also shooting into the right lateral foot and right sided toes 4/5.  She reports that she has had symptoms of burning, tingling numbness involving right lateral thigh for many years now and last 5 to 6 months she is now having symptoms of pain, tingling numbness shooting down to right lateral foot.  Her last MRI of lumbar spine was back in 2022 which showed mild to moderate spondylitic changes at L4-L5 and L5-S1.  She was following up with pain management and has had lumbar epidural injection and radiofrequency ablation which was only temporarily beneficial.    The following portions of the patient's history were reviewed and updated as of 04/15/2024: allergies, social history, and problem list.       Current Outpatient Medications:     buPROPion XL (Wellbutrin XL) 150 MG 24 hr tablet, Take 1 tablet by mouth Every Morning., Disp: 30 tablet, Rfl: 2    Caplyta 42 MG capsule, TAKE ONE CAPSULE BY MOUTH DAILY, Disp: 30 capsule, Rfl: 2    diazePAM (VALIUM) 5 MG tablet, TAKE 1 TABLET BY MOUTH DAILY AS NEEDED FOR ANXIETY, Disp: 10 tablet, Rfl: 0    galcanezumab-gnlm (EMGALITY) 120 MG/ML auto-injector pen, Inject 1 mL under the skin into the appropriate area as directed Every 30 (Thirty) Days., Disp: 1 mL, Rfl: 11    hydroxychloroquine (PLAQUENIL) 200 MG tablet, Take 1 tablet by mouth 2 (Two) Times a Day., Disp: , Rfl:     lamoTRIgine (LaMICtal) 200 MG tablet, Take 1 tablet by mouth 2 (Two) Times a Day., Disp: 60 tablet, Rfl: 2    predniSONE (DELTASONE) 10 MG tablet, 1 tablet. For 2-5 days prn RA flare, Disp: , Rfl:     propranolol LA (INDERAL LA) 160 MG 24 hr capsule, Take 1 capsule by mouth Daily., Disp: 90 capsule, Rfl: 1    Semaglutide, 1 MG/DOSE, (OZEMPIC) 2 MG/1.5ML solution pen-injector, Inject 2 mg under the skin into the appropriate area as directed 1 (One) Time Per Week., Disp: , Rfl:     tiZANidine (ZANAFLEX) 4 MG tablet, Take 1 tablet by mouth Every 8 (Eight) Hours  "As Needed for Muscle Spasms., Disp: 20 tablet, Rfl: 0    hydroCHLOROthiazide 25 MG tablet, Take 1 tablet by mouth Daily., Disp: , Rfl:     vitamin D (ERGOCALCIFEROL) 1.25 MG (72328 UT) capsule capsule, Take 1 capsule by mouth 1 (One) Time Per Week., Disp: , Rfl:    Past Medical History:   Diagnosis Date    Anxiety     Anxiety     Arthritis     Asthma     Back pain     Bipolar disorder     Cervical disc disorder     Chronic pain disorder     A very long time    Depression     Fibromyalgia, primary     Kidney disease, chronic, stage II (mild, EGFR 60+ ml/min)     Low back pain     Migraine     Mitral valve prolapse     MRSA (methicillin resistant Staphylococcus aureus)     Peripheral neuropathy     Psychiatric illness 2010?    Not sure what's meant by that    PTSD (post-traumatic stress disorder) 2017    Schizoaffective disorder 2014?    Self-injurious behavior 2010    Cutting    Tattoo       Past Surgical History:   Procedure Laterality Date    EPIDURAL BLOCK      FIBULA FRACTURE SURGERY  2014, 2015    FRACTURE SURGERY  2014    MANDIBLE FRACTURE SURGERY  2002    TRIGGER POINT INJECTION      WISDOM TOOTH EXTRACTION        Family History   Problem Relation Age of Onset    Breast cancer Mother     Arthritis Mother     Cancer Mother     Diabetes Maternal Grandfather     Hypertension Father     Migraines Father     Arthritis Maternal Grandmother     Colon cancer Neg Hx         Review of Systems  Objective:    /88   Pulse 82   Ht 165.1 cm (65\")   Wt (!) 169 kg (372 lb) Comment: self reported  SpO2 98%   BMI 61.90 kg/m²     Neurology Exam:  General apperance: NAD.     Mental status: Alert, awake and oriented to time place and person.    Language and Speech: No aphasia or dysarthria.    CN II to XII: Intact.    Opthalmoscopic Exam: No papilledema.    Motor:  Right UE muscle strength 5/5. Normal tone.     Left UE muscle strength 5/5. Normal tone.      Right LE muscle strength 5/5. Normal tone.     Left LE muscle " strength 5/5. Normal tone.      Sensory: Normal light touch, vibration and pinprick sensation bilaterally.    DTRs: 2+ bilaterally.    Babinski: Negative bilaterally.    Co-ordination: Normal finger-to-nose, heel to shin B/L.    Rhomberg: Negative.    Gait: Normal.    Cardiovascular: Regular rate and rhythm without murmur, gallop or rub.    Assessment and Plan:  1. Intractable chronic migraine without aura and without status migrainosus  2. Lumbar radiculopathy, chronic  Migraines remain under good control with Emgality and Inderal as 160 mg daily set will be continued.  She is reporting symptoms highly suggestive of meralgia paresthetica on the right and also lumbar radiculopathy.  Last MRI lumbar spine was back in 2022.  I will be repeating MRI of lumbar spine for further evaluation and based on the results, further treatment options will be discussed with her.  I may consider starting her on gabapentin or Lyrica based on the results of the results see her back in clinic in 6 months for follow-up.  - MRI Lumbar Spine Without Contrast; Future       I spent 30 minutes in patient care: Reviewing records prior to the visit, entering orders and documentation and spent more than vicente 50% of this time face-to-face in management, instructions and education regarding above mentioned diagnosis and also on counseling and discussing about taking medication regularly, possible side effects with medication use, importance of good sleep hygiene, good hydration and regular exercise.    Return in about 6 months (around 10/15/2024).       Note to patient: The 21st Century Cures Act makes medical notes like these available to patients in the interest of transparency. However, be advised this is a medical document. It is intended as peer to peer communication. It is written in medical language and may contain abbreviations or verbiage that are unfamiliar. It may appear blunt or direct. Medical documents are intended to carry relevant  information, facts as evident, and the clinical opinion of the physician.

## 2024-04-24 ENCOUNTER — HOSPITAL ENCOUNTER (OUTPATIENT)
Facility: HOSPITAL | Age: 39
Discharge: HOME | End: 2024-04-24
Admitting: PSYCHIATRY & NEUROLOGY
Payer: MEDICARE

## 2024-04-24 DIAGNOSIS — M54.16 LUMBAR RADICULOPATHY, CHRONIC: ICD-10-CM

## 2024-04-24 PROCEDURE — 72148 MRI LUMBAR SPINE W/O DYE: CPT

## 2024-04-29 ENCOUNTER — TELEPHONE (OUTPATIENT)
Dept: NEUROLOGY | Facility: CLINIC | Age: 39
End: 2024-04-29
Payer: MEDICARE

## 2024-04-29 NOTE — TELEPHONE ENCOUNTER
----- Message from You BARROSO sent at 4/25/2024  3:17 PM EDT -----  Please call the patient regarding her MRI of lumbosacral spine.  It shows very mild arthritis at L4-L5 and L5-S1.  No surgical intervention is recommended.  If she has not had physical therapy, that should be the first step and see if it helps.  If she is interested, I can send referral.  Thanks

## 2024-04-29 NOTE — TELEPHONE ENCOUNTER
Caller: Vicky Lowery    Relationship: Self    Best call back number: [phone]    What was the call regarding: PATIENT IS CALLING BACK FROM MESSAGE REGARDING HER MRI RESULTS.     PLEASE ADVISE  THANK YOU

## 2024-06-15 DIAGNOSIS — F41.1 GAD (GENERALIZED ANXIETY DISORDER): ICD-10-CM

## 2024-06-17 NOTE — TELEPHONE ENCOUNTER
Has follow up 7/8/20274, last seen in office 11/2022, Telemedicine was 8/2023    Rx Refill Note  Requested Prescriptions     Pending Prescriptions Disp Refills    diazePAM (VALIUM) 5 MG tablet [Pharmacy Med Name: diazePAM 5 MG TABLET] 10 tablet      Sig: TAKE 1 TABLET BY MOUTH DAILY AS NEEDED FOR ANXIETY      Last office visit with prescribing clinician: 11/22/2022   Last telemedicine visit with prescribing clinician: Visit date not found   Next office visit with prescribing clinician: 7/8/2024                         Would you like a call back once the refill request has been completed: [] Yes [] No    If the office needs to give you a call back, can they leave a voicemail: [] Yes [] No    Gabby Villanueva MA  06/17/24, 08:59 EDT

## 2024-06-18 RX ORDER — DIAZEPAM 5 MG/1
5 TABLET ORAL DAILY PRN
Qty: 10 TABLET | Refills: 0 | Status: SHIPPED | OUTPATIENT
Start: 2024-06-18

## 2024-06-26 ENCOUNTER — TELEPHONE (OUTPATIENT)
Dept: NEUROLOGY | Facility: CLINIC | Age: 39
End: 2024-06-26
Payer: MEDICARE

## 2024-06-26 DIAGNOSIS — M54.16 LUMBAR RADICULOPATHY, CHRONIC: Primary | ICD-10-CM

## 2024-06-26 NOTE — TELEPHONE ENCOUNTER
Sending order request for PM to :    There is a Pain management Center on [address]:    [facility]:    [address]   [facility] [id number]    Fax# [phone]

## 2024-06-26 NOTE — TELEPHONE ENCOUNTER
Caller: Vicky Lowery    Relationship: Self    Best call back number: [phone] (home)     What is the medical concern/diagnosis: MIGRAINE    What specialty or service is being requested: PAIN CLINIC    What is the provider, practice or medical service name: [facility] DOES NOT MATTER BUT WOULD IT TO BE BY [facility]    PLEASE REVIEW  THANK YOU

## 2024-07-08 ENCOUNTER — OFFICE VISIT (OUTPATIENT)
Dept: BEHAVIORAL HEALTH | Facility: CLINIC | Age: 39
End: 2024-07-08
Payer: MEDICARE

## 2024-07-08 VITALS
DIASTOLIC BLOOD PRESSURE: 82 MMHG | HEIGHT: 65 IN | BODY MASS INDEX: 48.82 KG/M2 | OXYGEN SATURATION: 99 % | WEIGHT: 293 LBS | HEART RATE: 80 BPM | SYSTOLIC BLOOD PRESSURE: 132 MMHG

## 2024-07-08 DIAGNOSIS — Z51.81 ENCOUNTER FOR THERAPEUTIC DRUG MONITORING: ICD-10-CM

## 2024-07-08 DIAGNOSIS — F41.1 GAD (GENERALIZED ANXIETY DISORDER): ICD-10-CM

## 2024-07-08 DIAGNOSIS — F31.9 BIPOLAR DEPRESSION: Primary | ICD-10-CM

## 2024-07-08 PROCEDURE — 1159F MED LIST DOCD IN RCRD: CPT

## 2024-07-08 PROCEDURE — 1160F RVW MEDS BY RX/DR IN RCRD: CPT

## 2024-07-08 PROCEDURE — 99214 OFFICE O/P EST MOD 30 MIN: CPT

## 2024-07-08 RX ORDER — LUMATEPERONE 42 MG/1
1 CAPSULE ORAL DAILY
Qty: 30 CAPSULE | Refills: 2 | Status: SHIPPED | OUTPATIENT
Start: 2024-07-08

## 2024-07-08 RX ORDER — DIAZEPAM 5 MG/1
5 TABLET ORAL DAILY PRN
Qty: 10 TABLET | Refills: 2 | Status: SHIPPED | OUTPATIENT
Start: 2024-07-08

## 2024-07-08 NOTE — PROGRESS NOTES
Follow Up Office Visit    Patient Name: Vicky Lowery  : 1985   MRN: [id number]   Care Team: Patient Care Team:  José Miguel Dunn DO as PCP - General (Family Medicine)  Glenn Gonzales PA-C as Referring Physician (Physician Assistant)  Malgorzata Felder APRN as Nurse Practitioner (Behavioral Health)         Chief Complaint:    Chief Complaint   Patient presents with    Bipolar    Anxiety    Med Management       History of Present Illness: Vicky Lowery is a 39 y.o. female who is here today for a medication management follow up. Patient reports that overall medication continues to be effective. She feels that her mood and anxiety have been stable. She states that she still has bad days at time, however, she is able to manage them well. She did not see the need to make any changes and did not have any medication concerns at today's visit. She denies SI/HI today.     The following portion of the patient's history were reviewed and updated appropriately: allergies, current and past medications, family history, medical history and social history. JAYRO reviewed and appropriate.   Subjective   Review of Systems:    Review of Systems   Respiratory: Negative.     Cardiovascular: Negative.  Negative for chest pain and palpitations.   Neurological:  Negative for dizziness and confusion.   Psychiatric/Behavioral: Negative.     All other systems reviewed and are negative.      Current Medications:   Current Outpatient Medications   Medication Sig Dispense Refill    diazePAM (VALIUM) 5 MG tablet Take 1 tablet by mouth Daily As Needed for Anxiety. 10 tablet 2    galcanezumab-gnlm (EMGALITY) 120 MG/ML auto-injector pen Inject 1 mL under the skin into the appropriate area as directed Every 30 (Thirty) Days. 1 mL 11    hydroCHLOROthiazide 25 MG tablet Take 1 tablet by mouth Daily.      hydroxychloroquine (PLAQUENIL) 200 MG tablet Take 1 tablet by mouth 2 (Two) Times a Day.       "Lumateperone Tosylate (Caplyta) 42 MG capsule Take 1 capsule by mouth Daily. 30 capsule 2    predniSONE (DELTASONE) 10 MG tablet 1 tablet. For 2-5 days prn RA flare      propranolol LA (INDERAL LA) 160 MG 24 hr capsule Take 1 capsule by mouth Daily. 90 capsule 1    Semaglutide, 1 MG/DOSE, (OZEMPIC) 2 MG/1.5ML solution pen-injector Inject 2 mg under the skin into the appropriate area as directed 1 (One) Time Per Week.      tiZANidine (ZANAFLEX) 4 MG tablet Take 1 tablet by mouth Every 8 (Eight) Hours As Needed for Muscle Spasms. 20 tablet 0    vitamin D (ERGOCALCIFEROL) 1.25 MG (34937 UT) capsule capsule Take 1 capsule by mouth 1 (One) Time Per Week.       No current facility-administered medications for this visit.       Mental Status Exam:   Hygiene:   good  Cooperation:  Cooperative  Eye Contact:  Good  Psychomotor Behavior:  Appropriate  Affect:  Appropriate  Mood: normal  Speech:  Normal  Thought Process:  Goal directed and Linear  Thought Content:  Normal and Mood congruent  Suicidal:  None  Homicidal:  None  Hallucinations:  None  Delusion:  None  Memory:  Intact  Orientation:  Person, Place, Time, and Situation  Reliability:  good  Insight:  Good  Judgement:  Good  Impulse Control:  Good  Physical/Medical Issues:  Yes see chart       Objective   Vital Signs:   /82   Pulse 80   Ht 165.1 cm (65\")   Wt (!) 162 kg (357 lb)   SpO2 99%   BMI 59.41 kg/m²       Assessment / Plan    Diagnoses and all orders for this visit:    1. Bipolar depression (Primary)  -     Lumateperone Tosylate (Caplyta) 42 MG capsule; Take 1 capsule by mouth Daily.  Dispense: 30 capsule; Refill: 2    2. MARGIE (generalized anxiety disorder)  -     diazePAM (VALIUM) 5 MG tablet; Take 1 tablet by mouth Daily As Needed for Anxiety.  Dispense: 10 tablet; Refill: 2  -     Compliance Drug Analysis, Ur - Urine, Clean Catch    3. Encounter for therapeutic drug monitoring  -     Compliance Drug Analysis, Ur - Urine, Clean Catch     Patient " appears to be doing well on current medication. No issues reported and no indication for change. Will continue medication as ordered. Obtained yearly urine drug screen and controlled substance agreement signed.     History and physical exam exhibit continued safe and appropriate use of controlled substance.    As part of patient's treatment plan I am prescribing a controlled substance.  The patient has been made aware of the appropriate use of such medications, including potential risk of somnolence, limited ability to drive and/or work safely, and potential for dependence and/or overdose.  It has also been made clear that these medications are for use by this patient only, without concomitant use of alcohol or other substances, unless prescribed.    Patient has completed a prescribing agreement detailing terms of continued prescribing of controlled substances, including monitoring JAYRO reports, urine drug screening, and pill counts if necessary.  Patient is aware that inappropriate use will result in cessation of prescribing such medications      AIMS  Facial and Oral Movements  Muscles of Facial Expression: None, normal  Lips and Perioral Area: None, normal  Jaw: None, normal  Tongue: None, normal  Extremity Movements  Upper (arms, wrists, hands, fingers): None, normal  Lower (legs, knees, ankles, toes): None, normal  Trunk Movements  Neck, shoulders, hips: None, normal  Overall Severity  Severity of abnormal movements (max 4): 0  Incapacitation due to abnormal movements: None, normal  Patient's awareness of abnormal movements (rate only patient's report): Aware, no distress  Dental Status  Current problems with teeth and/or dentures?: No  Does patient usually wear dentures?: No        PHQ-9  PHQ-2/PHQ-9: Depression Screening  Little Interest or Pleasure in Doing Things: 1-->several days  Feeling Down, Depressed or Hopeless: 0-->not at all  PHQ-2 Total Score: 1  Trouble Falling or Staying Asleep, or Sleeping Too  Much: 3-->nearly every day  Feeling Tired or Having Little Energy: 3-->nearly every day  Poor Appetite or Overeatin-->more than half the days  Feeling Bad about Yourself - or that You are a Failure or Have Let Yourself or Your Family Down: 0-->not at all  Moving or Speaking So Slowly that Other People Could Have Noticed? Or the Opposite - Being So Fidgety: 1-->several days  Thoughts that You Would be Better Off Dead or of Hurting Yourself in Some Way: 0-->not at all  PHQ-9: Brief Depression Severity Measure Score: 10  If You Checked Off Any Problems, How Difficult Have These Problems Made It For You to Do Your Work, Take Care of Things at Home, or Get Along with Other People?: somewhat difficult      PHQ-9 Score:   PHQ-9 Total Score: 10    Depression Screening:  Patient screened positive for depression based on a PHQ-9 score of 10 on 2024. Follow-up recommendations include: Prescribed antidepressant medication treatment and Suicide Risk Assessment performed.        MARGIE-7  Over the last two weeks, how often have you been bothered by the following problems?  Feeling nervous, anxious or on edge: More than half the days  Not being able to stop or control worrying: Several days  Worrying too much about different things: Several days  Trouble Relaxing: More than half the days  Being so restless that it is hard to sit still: Several days  Becoming easily annoyed or irritable: Several days  Feeling afraid as if something awful might happen: Several days  MARGIE 7 Total Score: 9  If you checked any problems, how difficult have these problems made it for you to do your work, take care of things at home, or get along with other people: Somewhat difficult      ADHD       A psychological evaluation was conducted in order to assess past and current level of functioning. Areas assessed included, but were not limited to: perception of social support, perception of ability to face and deal with challenges in life (positive  functioning), anxiety symptoms, depressive symptoms, perspective on beliefs/belief system, coping skills for stress, intelligence level,  Therapeutic rapport was established. Interventions conducted today were geared towards incorporating medication management along with support for continued therapy. Education was also provided as to the med management with this provider and what to expect in subsequent sessions.      We discussed risks, benefits, goals and side effects of the above medication and the patient was agreeable with the plan. Patient was educated on the importance of compliance with treatment and follow-up appointments. Patient is aware to contact the [facility] with any worsening of symptoms. To call for questions or concerns and return early if necessary. Patent is agreeable to go to the Emergency Department or call 911 should they begin SI/HI.    MEDS ORDERED DURING VISIT:  New Medications Ordered This Visit   Medications    Lumateperone Tosylate (Caplyta) 42 MG capsule     Sig: Take 1 capsule by mouth Daily.     Dispense:  30 capsule     Refill:  2    diazePAM (VALIUM) 5 MG tablet     Sig: Take 1 tablet by mouth Daily As Needed for Anxiety.     Dispense:  10 tablet     Refill:  2         Follow Up   Return in about 3 months (around 10/8/2024).  Patient was given instructions and counseling regarding her condition or for health maintenance advice. Please see specific information pulled into the AVS if appropriate.     TREATMENT PLAN/GOALS: Continue supportive psychotherapy efforts and medications as indicated. Treatment and medication options discussed during today's visit. Patient acknowledged and verbally consented to continue with current treatment plan and was educated on the importance of compliance with treatment and follow-up appointments.    MEDICATION ISSUES:  Discussed medication options and treatment plan of prescribed medication as well as the risks, benefits, and side effects  including potential falls, possible impaired driving and metabolic adversities among others. Patient is agreeable to call the office with any worsening of symptoms or onset of side effects. Patient is agreeable to call 911 or go to the nearest ER should he/she begin having SI/HI.        [facility]  [address] [place] [address]  [phone]    July 8, 2024 16:12 EDT

## 2024-07-09 DIAGNOSIS — G43.719 INTRACTABLE CHRONIC MIGRAINE WITHOUT AURA AND WITHOUT STATUS MIGRAINOSUS: ICD-10-CM

## 2024-07-10 RX ORDER — PROPRANOLOL HYDROCHLORIDE 160 MG/1
160 CAPSULE, EXTENDED RELEASE ORAL DAILY
Qty: 90 CAPSULE | Refills: 1 | Status: SHIPPED | OUTPATIENT
Start: 2024-07-10

## 2024-07-10 NOTE — TELEPHONE ENCOUNTER
Rx Refill Note  Requested Prescriptions     Pending Prescriptions Disp Refills    propranolol LA (INDERAL LA) 160 MG 24 hr capsule [Pharmacy Med Name: PROPRANOLOL  MG CAPSULE] 90 capsule 1     Sig: TAKE 1 CAPSULE BY MOUTH DAILY     Last filled:1/17/24 1 refill  Last office visit with prescribing clinician: 4/15/2024      Next office visit with prescribing clinician: 10/15/2024     ROBINA BARDALES  07/10/24, 09:26 EDT    Sent to pharmacy 90ds 1 refill

## 2024-07-17 LAB — DRUGS UR: NORMAL

## 2024-07-23 NOTE — TELEPHONE ENCOUNTER
[facility] received a fax that requires your attention. The document has been indexed to the patient’s chart for your review.      Reason for sending: RECEIVED FAX FROM [facility], THAT THEY RECEIVED THE PAIN MGMT REFERRAL & NOTED THAT THEY ARE NOT [place] & PATIENT ALREADY IN PAIN MGMT, WE DO NOT ALLOW 2 PAIN DRS. WE WILL NEED THOSE NOTES BEFORE A DECISION IS MADE.    Documents Description: EXT MED RECS:  REFERRAL RESPONSE_[facility]_07/18/24    Name of Sender: BRUNA-DANNY SAMAYOA MD    Date Indexed: 07/23/24

## 2024-10-08 ENCOUNTER — TELEMEDICINE (OUTPATIENT)
Dept: BEHAVIORAL HEALTH | Facility: CLINIC | Age: 39
End: 2024-10-08
Payer: MEDICARE

## 2024-10-08 DIAGNOSIS — F31.9 BIPOLAR DEPRESSION: ICD-10-CM

## 2024-10-08 DIAGNOSIS — F41.1 GAD (GENERALIZED ANXIETY DISORDER): ICD-10-CM

## 2024-10-08 RX ORDER — DIAZEPAM 5 MG
5 TABLET ORAL DAILY PRN
Qty: 10 TABLET | Refills: 1 | Status: SHIPPED | OUTPATIENT
Start: 2024-10-08

## 2024-10-08 RX ORDER — LUMATEPERONE 42 MG/1
1 CAPSULE ORAL DAILY
Qty: 30 CAPSULE | Refills: 1 | Status: SHIPPED | OUTPATIENT
Start: 2024-10-08

## 2024-10-08 RX ORDER — LAMOTRIGINE 25 MG/1
TABLET ORAL
Qty: 60 TABLET | Refills: 1 | Status: SHIPPED | OUTPATIENT
Start: 2024-10-08

## 2024-10-08 NOTE — PROGRESS NOTES
This provider is located at [facility], Behavioral Health, [address] [place] [address], [facility],using a secure MyChart Video Visit through [employer]. Patient is being seen remotely via telehealth at their home address in [place], and stated they are in a secure environment for this session. The patient's condition being diagnosed/treated is appropriate for telemedicine. The provider identified herself as well as her credentials.   The patient, and/or patients guardian, consent to be seen remotely, and when consent is given they understand that the consent allows for patient identifiable information to be sent to a third party as needed.   They may refuse to be seen remotely at any time. The electronic data is encrypted and password protected, and the patient and/or guardian has been advised of the potential risks to privacy not withstanding such measures.    Video Visit    Patient Name: Vicky Lowery  : 1985   MRN: [id number]   Care Team: Patient Care Team:  José Miguel Dunn DO as PCP - General (Family Medicine)  Glenn Gonzales PA-C as Referring Physician (Physician Assistant)  Malgorzata Felder APRN as Nurse Practitioner (Behavioral Health)         Chief Complaint:    Chief Complaint   Patient presents with    Anxiety    Bipolar    Med Management       History of Present Illness: Vicky Lowery is a 39 y.o. female who presents via video visit for a medication management follow up. Patient reports feeling that her mood has not felt as stable. She feels that she has been much more irritable and that has been harder to manage. She denies SI/HI today.     The following portion of the patient's history were reviewed and updated appropriately: allergies, current and past medications, family history, medical history and social history. JAYRO reviewed and appropriate.   Subjective   Review of Systems:    Review of Systems   Respiratory: Negative.      Cardiovascular: Negative.  Negative for chest pain and palpitations.   Neurological: Negative.    Psychiatric/Behavioral:  Positive for agitation and stress. The patient is nervous/anxious.    All other systems reviewed and are negative.      Current Medications:   Current Outpatient Medications   Medication Sig Dispense Refill    diazePAM (VALIUM) 5 MG tablet Take 1 tablet by mouth Daily As Needed for Anxiety. 10 tablet 1    Lumateperone Tosylate (Caplyta) 42 MG capsule Take 1 capsule by mouth Daily. 30 capsule 1    galcanezumab-gnlm (EMGALITY) 120 MG/ML auto-injector pen Inject 1 mL under the skin into the appropriate area as directed Every 30 (Thirty) Days. 1 mL 11    hydroCHLOROthiazide 25 MG tablet Take 1 tablet by mouth Daily.      hydroxychloroquine (PLAQUENIL) 200 MG tablet Take 1 tablet by mouth 2 (Two) Times a Day.      lamoTRIgine (LaMICtal) 25 MG tablet TAKE ONE TABLET BY  MOUTH NIGHTLY FOR TWO WEEKS, THEN INCREASE TO TWO TABLETS BY MOUTH NIGHTLY 60 tablet 1    predniSONE (DELTASONE) 10 MG tablet 1 tablet. For 2-5 days prn RA flare      propranolol LA (INDERAL LA) 160 MG 24 hr capsule TAKE 1 CAPSULE BY MOUTH DAILY 90 capsule 1    Semaglutide, 1 MG/DOSE, (OZEMPIC) 2 MG/1.5ML solution pen-injector Inject 2 mg under the skin into the appropriate area as directed 1 (One) Time Per Week.      tiZANidine (ZANAFLEX) 4 MG tablet Take 1 tablet by mouth Every 8 (Eight) Hours As Needed for Muscle Spasms. 20 tablet 0    vitamin D (ERGOCALCIFEROL) 1.25 MG (72890 UT) capsule capsule Take 1 capsule by mouth 1 (One) Time Per Week.       No current facility-administered medications for this visit.       Mental Status Exam:   Hygiene:    unable to assess   Cooperation:  Cooperative  Eye Contact:  Good  Psychomotor Behavior:   appears to be WNL   Affect:  Appropriate  Mood: depressed, anxious, irritable, and fluctates  Speech:  Normal  Thought Process:  Goal directed and Linear  Thought Content:  Normal and Mood  congruent  Suicidal:  None  Homicidal:  None  Hallucinations:  None  Delusion:  None  Memory:  Intact  Orientation:  Person, Place, Time, and Situation  Reliability:  good  Insight:  Good  Judgement:  Good  Impulse Control:  Good  Physical/Medical Issues:  Yes see chart       Objective   Vital Signs:   There were no vitals taken for this visit.      On 7/8/2024  BP: 132/82  P: 80    Assessment / Plan    Diagnoses and all orders for this visit:    1. MARGIE (generalized anxiety disorder)  -     diazePAM (VALIUM) 5 MG tablet; Take 1 tablet by mouth Daily As Needed for Anxiety.  Dispense: 10 tablet; Refill: 1    2. Bipolar depression  -     lamoTRIgine (LaMICtal) 25 MG tablet; TAKE ONE TABLET BY  MOUTH NIGHTLY FOR TWO WEEKS, THEN INCREASE TO TWO TABLETS BY MOUTH NIGHTLY  Dispense: 60 tablet; Refill: 1  -     Lumateperone Tosylate (Caplyta) 42 MG capsule; Take 1 capsule by mouth Daily.  Dispense: 30 capsule; Refill: 1    Will add Lamictal nightly. Continue all other medication as ordered.    As part of patient's treatment plan I am prescribing a controlled substance.  The patient has been made aware of the appropriate use of such medications, including potential risk of somnolence, limited ability to drive and/or work safely, and potential for dependence and/or overdose.  It has also been made clear that these medications are for use by this patient only, without concomitant use of alcohol or other substances, unless prescribed.    Patient has completed a prescribing agreement detailing terms of continued prescribing of controlled substances, including monitoring JAYRO reports, urine drug screening, and pill counts if necessary.  Patient is aware that inappropriate use will result in cessation of prescribing such medications    AIMS  Facial and Oral Movements  Muscles of Facial Expression: None, normal  Lips and Perioral Area: None, normal  Jaw: None, normal  Tongue: None, normal  Extremity Movements  Upper (arms, wrists,  hands, fingers): None, normal  Lower (legs, knees, ankles, toes): None, normal  Trunk Movements  Neck, shoulders, hips: None, normal  Overall Severity  Severity of abnormal movements (max 4): 0  Incapacitation due to abnormal movements: None, normal  Patient's awareness of abnormal movements (rate only patient's report): Aware, no distress  Dental Status  Current problems with teeth and/or dentures?: No  Does patient usually wear dentures?: No        A psychological evaluation was conducted in order to assess past and current level of functioning. Areas assessed included, but were not limited to: perception of social support, perception of ability to face and deal with challenges in life (positive functioning), anxiety symptoms, depressive symptoms, perspective on beliefs/belief system, coping skills for stress, intelligence level,  Therapeutic rapport was established. Interventions conducted today were geared towards incorporating medication management along with support for continued therapy. Education was also provided as to the med management with this provider and what to expect in subsequent sessions.      We discussed risks, benefits, goals and side effects of the above medication and the patient was agreeable with the plan. Patient was educated on the importance of compliance with treatment and follow-up appointments. Patient is aware to contact the [facility] with any worsening of symptoms. To call for questions or concerns and return early if necessary. Patent is agreeable to go to the Emergency Department or call 911 should they begin SI/HI.        MEDS ORDERED DURING VISIT:  New Medications Ordered This Visit   Medications    lamoTRIgine (LaMICtal) 25 MG tablet     Sig: TAKE ONE TABLET BY  MOUTH NIGHTLY FOR TWO WEEKS, THEN INCREASE TO TWO TABLETS BY MOUTH NIGHTLY     Dispense:  60 tablet     Refill:  1    diazePAM (VALIUM) 5 MG tablet     Sig: Take 1 tablet by mouth Daily As Needed for Anxiety.      Dispense:  10 tablet     Refill:  1    Lumateperone Tosylate (Caplyta) 42 MG capsule     Sig: Take 1 capsule by mouth Daily.     Dispense:  30 capsule     Refill:  1         Follow Up   Return in about 6 weeks (around 11/19/2024).  Patient was given instructions and counseling regarding her condition or for health maintenance advice. Please see specific information pulled into the AVS if appropriate.     TREATMENT PLAN/GOALS: Continue supportive psychotherapy efforts and medications as indicated. Treatment and medication options discussed during today's visit. Patient acknowledged and verbally consented to continue with current treatment plan and was educated on the importance of compliance with treatment and follow-up appointments.    MEDICATION ISSUES:  Discussed medication options and treatment plan of prescribed medication as well as the risks, benefits, and side effects including potential falls, possible impaired driving and metabolic adversities among others. Patient is agreeable to call the office with any worsening of symptoms or onset of side effects. Patient is agreeable to call 911 or go to the nearest ER should he/she begin having SI/HI.        FRANCK Sotelo PC [facility]  [address] [place] [address] [address] [place] [id number] [phone]    October 15, 2024 13:25 EDT

## 2024-11-04 RX ORDER — GALCANEZUMAB 120 MG/ML
120 INJECTION, SOLUTION SUBCUTANEOUS
Qty: 1 ML | Refills: 0 | Status: SHIPPED | OUTPATIENT
Start: 2024-11-04

## 2024-11-04 NOTE — TELEPHONE ENCOUNTER
Rx Refill Note  Requested Prescriptions     Pending Prescriptions Disp Refills    Emgality 120 MG/ML auto-injector pen [Pharmacy Med Name: EMGALITY 120 MG/ML PEN] 1 mL 11     Sig: INJECT 1ML UNDER THE SKIN INTO THE APPROPRIATE AREA AS DIRECTED EVERY 30 DAYS      Last filled: 10/19/23 for 1 yr     Last office visit with prescribing clinician: 4/15/2024      Next office visit with prescribing clinician: Visit date not found. Had f/u scheduled 10/15/24 and no show'd appt, has yet to reschedule    You Acosta MA  11/04/24, 08:19 EST

## 2024-11-20 ENCOUNTER — TELEMEDICINE (OUTPATIENT)
Dept: BEHAVIORAL HEALTH | Facility: CLINIC | Age: 39
End: 2024-11-20
Payer: MEDICARE

## 2024-11-20 DIAGNOSIS — F41.1 GAD (GENERALIZED ANXIETY DISORDER): ICD-10-CM

## 2024-11-20 DIAGNOSIS — F31.9 BIPOLAR DEPRESSION: Primary | ICD-10-CM

## 2024-11-20 RX ORDER — DIAZEPAM 5 MG/1
5 TABLET ORAL DAILY PRN
Qty: 10 TABLET | Refills: 1 | Status: SHIPPED | OUTPATIENT
Start: 2024-11-20

## 2024-11-20 RX ORDER — OXCARBAZEPINE 150 MG/1
TABLET, FILM COATED ORAL
Qty: 60 TABLET | Refills: 1 | Status: SHIPPED | OUTPATIENT
Start: 2024-11-20

## 2024-11-20 RX ORDER — LUMATEPERONE 42 MG/1
1 CAPSULE ORAL DAILY
Qty: 30 CAPSULE | Refills: 1 | Status: SHIPPED | OUTPATIENT
Start: 2024-11-20

## 2024-11-20 NOTE — PROGRESS NOTES
This provider is located at [facility], Behavioral Health, [address] [place] [address], [facility],using a secure MyChart Video Visit through [employer]. Patient is being seen remotely via telehealth at their home address in [place], and stated they are in a secure environment for this session. The patient's condition being diagnosed/treated is appropriate for telemedicine. The provider identified herself as well as her credentials.   The patient, and/or patients guardian, consent to be seen remotely, and when consent is given they understand that the consent allows for patient identifiable information to be sent to a third party as needed.   They may refuse to be seen remotely at any time. The electronic data is encrypted and password protected, and the patient and/or guardian has been advised of the potential risks to privacy not withstanding such measures.    Video Visit    Patient Name: Vicky Lowery  : 1985   MRN: [id number]   Care Team: Patient Care Team:  José Miguel Dunn DO as PCP - General (Family Medicine)  Glenn Gonzales PA-C as Referring Physician (Physician Assistant)  Malgorzata Felder APRN as Nurse Practitioner (Behavioral Health)         Chief Complaint:    Chief Complaint   Patient presents with    Bipolar     Anxiety    Med Management       History of Present Illness: Vicky Lowery is a 39 y.o. female who presents via video visit for a medication management follow up. Patient reports not noticing much of an improvement since adding the Lamictal. She states she has had a rough 6 weeks and has been feeling more depressed. She does endorse thoughts of self harm and suicide, however denies any plan and denies any action. She feels that her good days are good and her bad days are really bad. She does endorse some situational stressors and recent changes that she knows could be playing a part in her exacerbation of symptoms. She denies  SI/HI today.     The following portion of the patient's history were reviewed and updated appropriately: allergies, current and past medications, family history, medical history and social history. JAYRO reviewed and appropriate.   Subjective   Review of Systems:    Review of Systems   Respiratory: Negative.     Cardiovascular: Negative.  Negative for chest pain and palpitations.   Neurological: Negative.    Psychiatric/Behavioral:  Positive for depressed mood and stress. The patient is nervous/anxious.    All other systems reviewed and are negative.      Current Medications:   Current Outpatient Medications   Medication Sig Dispense Refill    diazePAM (VALIUM) 5 MG tablet Take 1 tablet by mouth Daily As Needed for Anxiety. 10 tablet 1    Lumateperone Tosylate (Caplyta) 42 MG capsule Take 1 capsule by mouth Daily. 30 capsule 1    galcanezumab-gnlm (Emgality) 120 MG/ML auto-injector pen Inject 1 mL under the skin into the appropriate area as directed Every 30 (Thirty) Days. **Must schedule and KEEP appointment for additional refills** 1 mL 0    hydroCHLOROthiazide 25 MG tablet Take 1 tablet by mouth Daily.      hydroxychloroquine (PLAQUENIL) 200 MG tablet Take 1 tablet by mouth 2 (Two) Times a Day.      OXcarbazepine (Trileptal) 150 MG tablet TAKE ONE TABLET BY MOUTH NIGHTLY FOR TWO WEEKS, THEN INCREASE TO ONE TABLET BY MOUTH TWICE A DAY. 60 tablet 1    predniSONE (DELTASONE) 10 MG tablet 1 tablet. For 2-5 days prn RA flare      propranolol LA (INDERAL LA) 160 MG 24 hr capsule TAKE 1 CAPSULE BY MOUTH DAILY 90 capsule 1    Semaglutide, 1 MG/DOSE, (OZEMPIC) 2 MG/1.5ML solution pen-injector Inject 2 mg under the skin into the appropriate area as directed 1 (One) Time Per Week.      tiZANidine (ZANAFLEX) 4 MG tablet Take 1 tablet by mouth Every 8 (Eight) Hours As Needed for Muscle Spasms. 20 tablet 0    vitamin D (ERGOCALCIFEROL) 1.25 MG (54977 UT) capsule capsule Take 1 capsule by mouth 1 (One) Time Per Week.       No  current facility-administered medications for this visit.       Mental Status Exam:   Hygiene:    appears to be WNL   Cooperation:  Cooperative  Eye Contact:  Good  Psychomotor Behavior:   appears to be WNL   Affect:  Appropriate  Mood: depressed  Speech:  Normal  Thought Process:  Goal directed and Linear  Thought Content:  Normal and Mood congruent  Suicidal:  None  Homicidal:  None  Hallucinations:  None  Delusion:  None  Memory:  Intact  Orientation:  Person, Place, Time, and Situation  Reliability:  good  Insight:  Good  Judgement:  Good  Impulse Control:  Good  Physical/Medical Issues:  Yes see chart       Objective   Vital Signs:   There were no vitals taken for this visit.      On 7/8/2024  BP: 132/82  P: 80      Lab Results:   No visits with results within 3 Month(s) from this visit.   Latest known visit with results is:   Office Visit on 07/08/2024   Component Date Value Ref Range Status    Report Summary 07/08/2024 FINAL   Final    Comment: ====================================================================  [facility] DRUG ANALYSIS,[facility]  ====================================================================  Test                             Result       Flag       Units  Drug Present and Declared for Prescription Verification    Desmethyldiazepam              129          EXPECTED   ng/mg creat    Oxazepam                       142          EXPECTED   ng/mg creat    Temazepam                      100          EXPECTED   ng/mg creat     Desmethyldiazepam, oxazepam, and temazepam are expected     metabolites of diazepam. Desmethyldiazepam and oxazepam are also     expected metabolites of other drugs, including chlordiazepoxide,     prazepam, clorazepate, and halazepam. Oxazepam is an expected     metabolite of temazepam. Oxazepam and temazepam are also     available as scheduled prescription medications.    Propranolol                    PRESENT      EXPECTED  Drug Present not Declared for Prescription  Verification    Car                           boxy-THC                    >234         UNEXPECTED ng/mg creat     Carboxy-THC is a metabolite of tetrahydrocannabinol (THC). Source     of THC is most commonly herbal marijuana or marijuana-based     products, but THC is also present in a scheduled prescription     medication. Trace amounts of THC can be present in hemp and     cannabidiol (CBD) products. This test is not intended to     distinguish between delta-9-tetrahydrocannabinol, the predominant     form of THC in most herbal or marijuana-based products, and     delta-8-tetrahydrocannabinol.    Diclofenac                     PRESENT      UNEXPECTED    Naproxen                       PRESENT      UNEXPECTED  Drug Absent but Declared for Prescription Verification    Lamotrigine                    Not Detected UNEXPECTED    Tizanidine                     Not Detected UNEXPECTED     Tizanidine, as indicated in the declared medication list, is not     always detected even when used as directed.    Bupropion                      Not                            Detected UNEXPECTED  ====================================================================  Test                      Result    Flag   Units      Ref Range    Creatinine              427              mg/dL      >=20  ====================================================================  Declared Medications:   The flagging and interpretation on this report are based on the   following declared medications.  Unexpected results may arise from   inaccuracies in the declared medications.   **Note: The testing scope of this panel includes these medications:   Bupropion (Wellbutrin)   Diazepam (Valium)   Lamotrigine (Lamictal)   Propranolol (Inderal)   **Note: The testing scope of this panel does not include small to   moderate amounts of these reported medications:   Tizanidine   **Note: The testing scope of this panel does not include following   reported medications:    Galcanezumab (Emgality)   Hydrochlorothiazide   Hydroxychloroquine (Plaquenil)   Lumateperone (Caplyta)   Prednisone (Delta                           sone)   Semaglutide (Ozempic)   Vitamin D2 (Ergocalciferol)  ====================================================================  For clinical consultation, please call [phone].  ====================================================================        Assessment / Plan    Diagnoses and all orders for this visit:    1. Bipolar depression (Primary)  -     OXcarbazepine (Trileptal) 150 MG tablet; TAKE ONE TABLET BY MOUTH NIGHTLY FOR TWO WEEKS, THEN INCREASE TO ONE TABLET BY MOUTH TWICE A DAY.  Dispense: 60 tablet; Refill: 1  -     Lumateperone Tosylate (Caplyta) 42 MG capsule; Take 1 capsule by mouth Daily.  Dispense: 30 capsule; Refill: 1    2. MARGIE (generalized anxiety disorder)  -     diazePAM (VALIUM) 5 MG tablet; Take 1 tablet by mouth Daily As Needed for Anxiety.  Dispense: 10 tablet; Refill: 1    Will discontinue Lamictal and try Trileptal. Continue all other medication as ordered.     As part of patient's treatment plan I am prescribing a controlled substance.  The patient has been made aware of the appropriate use of such medications, including potential risk of somnolence, limited ability to drive and/or work safely, and potential for dependence and/or overdose.  It has also been made clear that these medications are for use by this patient only, without concomitant use of alcohol or other substances, unless prescribed.    Patient has completed a prescribing agreement detailing terms of continued prescribing of controlled substances, including monitoring JAYRO reports, urine drug screening, and pill counts if necessary.  Patient is aware that inappropriate use will result in cessation of prescribing such medications.    AIMS  Facial and Oral Movements  Muscles of Facial Expression: None, normal  Lips and Perioral Area: None, normal  Jaw: None,  normal  Tongue: None, normal  Extremity Movements  Upper (arms, wrists, hands, fingers): None, normal  Lower (legs, knees, ankles, toes): None, normal  Trunk Movements  Neck, shoulders, hips: None, normal  Overall Severity  Severity of abnormal movements (max 4): 0  Incapacitation due to abnormal movements: None, normal  Patient's awareness of abnormal movements (rate only patient's report): Aware, no distress  Dental Status  Current problems with teeth and/or dentures?: No  Does patient usually wear dentures?: No        A psychological evaluation was conducted in order to assess past and current level of functioning. Areas assessed included, but were not limited to: perception of social support, perception of ability to face and deal with challenges in life (positive functioning), anxiety symptoms, depressive symptoms, perspective on beliefs/belief system, coping skills for stress, intelligence level,  Therapeutic rapport was established. Interventions conducted today were geared towards incorporating medication management along with support for continued therapy. Education was also provided as to the med management with this provider and what to expect in subsequent sessions.      We discussed risks, benefits, goals and side effects of the above medication and the patient was agreeable with the plan. Patient was educated on the importance of compliance with treatment and follow-up appointments. Patient is aware to contact the [facility] with any worsening of symptoms. To call for questions or concerns and return early if necessary. Patent is agreeable to go to the Emergency Department or call 911 should they begin SI/HI.        MEDS ORDERED DURING VISIT:  New Medications Ordered This Visit   Medications    OXcarbazepine (Trileptal) 150 MG tablet     Sig: TAKE ONE TABLET BY MOUTH NIGHTLY FOR TWO WEEKS, THEN INCREASE TO ONE TABLET BY MOUTH TWICE A DAY.     Dispense:  60 tablet     Refill:  1    diazePAM (VALIUM)  5 MG tablet     Sig: Take 1 tablet by mouth Daily As Needed for Anxiety.     Dispense:  10 tablet     Refill:  1    Lumateperone Tosylate (Caplyta) 42 MG capsule     Sig: Take 1 capsule by mouth Daily.     Dispense:  30 capsule     Refill:  1         Follow Up   Return in about 6 weeks (around 1/1/2025).  Patient was given instructions and counseling regarding her condition or for health maintenance advice. Please see specific information pulled into the AVS if appropriate.     TREATMENT PLAN/GOALS: Continue supportive psychotherapy efforts and medications as indicated. Treatment and medication options discussed during today's visit. Patient acknowledged and verbally consented to continue with current treatment plan and was educated on the importance of compliance with treatment and follow-up appointments.    MEDICATION ISSUES:  Discussed medication options and treatment plan of prescribed medication as well as the risks, benefits, and side effects including potential falls, possible impaired driving and metabolic adversities among others. Patient is agreeable to call the office with any worsening of symptoms or onset of side effects. Patient is agreeable to call 911 or go to the nearest ER should he/she begin having SI/HI.        FRANCK Sotelo PC [facility]  [address] [place] [address] [address] [place] [address]  [phone]    November 21, 2024 09:20 EST

## 2024-12-02 RX ORDER — GALCANEZUMAB 120 MG/ML
INJECTION, SOLUTION SUBCUTANEOUS
Qty: 1 ML | Refills: 0 | OUTPATIENT
Start: 2024-12-02

## 2024-12-17 RX ORDER — GALCANEZUMAB 120 MG/ML
INJECTION, SOLUTION SUBCUTANEOUS
Qty: 1 ML | Refills: 0 | OUTPATIENT
Start: 2024-12-17

## 2024-12-20 ENCOUNTER — PATIENT MESSAGE (OUTPATIENT)
Dept: NEUROLOGY | Facility: CLINIC | Age: 39
End: 2024-12-20
Payer: MEDICARE

## 2024-12-20 ENCOUNTER — TELEPHONE (OUTPATIENT)
Dept: NEUROLOGY | Facility: CLINIC | Age: 39
End: 2024-12-20
Payer: MEDICARE

## 2024-12-23 RX ORDER — GALCANEZUMAB 120 MG/ML
120 INJECTION, SOLUTION SUBCUTANEOUS
Qty: 1 ML | Refills: 5 | Status: SHIPPED | OUTPATIENT
Start: 2024-12-23

## 2024-12-23 NOTE — TELEPHONE ENCOUNTER
Rx Refill Note  Requested Prescriptions     Pending Prescriptions Disp Refills    Emgality 120 MG/ML auto-injector pen [Pharmacy Med Name: EMGALITY 120 MG/ML PEN] 1 mL 0     Sig: INJECT 1ML UNDER THE SKIN INTO THE APPROPRIATE AREA AS DIRECTED EVERY 30 DAYS **MUST SCHEDULE AND KEEP APPOINTMENT FOR ADDITIONAL REFILLS**      Last filled: 11/4/24 1 mo + 0 refills   Last office visit with prescribing clinician: 4/15/2024      Next office visit with prescribing clinician: 5/23/2025     You Acosta MA  12/23/24, 14:29 EST

## 2025-01-05 DIAGNOSIS — G43.719 INTRACTABLE CHRONIC MIGRAINE WITHOUT AURA AND WITHOUT STATUS MIGRAINOSUS: ICD-10-CM

## 2025-01-07 RX ORDER — PROPRANOLOL HYDROCHLORIDE 160 MG/1
160 CAPSULE, EXTENDED RELEASE ORAL DAILY
Qty: 90 CAPSULE | Refills: 1 | Status: SHIPPED | OUTPATIENT
Start: 2025-01-07

## 2025-01-07 NOTE — TELEPHONE ENCOUNTER
Rx Refill Note  Requested Prescriptions     Pending Prescriptions Disp Refills    propranolol LA (INDERAL LA) 160 MG 24 hr capsule [Pharmacy Med Name: PROPRANOLOL  MG CAPSULE] 90 capsule 1     Sig: TAKE 1 CAPSULE BY MOUTH DAILY      Last filled: 7/10/24 for 6 mos total  Last office visit with prescribing clinician: 4/15/2024      Next office visit with prescribing clinician: 5/23/2025     You Acosta MA  01/07/25, 13:04 EST

## 2025-01-09 ENCOUNTER — TELEMEDICINE (OUTPATIENT)
Dept: BEHAVIORAL HEALTH | Facility: CLINIC | Age: 40
End: 2025-01-09
Payer: MEDICARE

## 2025-01-09 DIAGNOSIS — F31.9 BIPOLAR DEPRESSION: Primary | ICD-10-CM

## 2025-01-09 DIAGNOSIS — F41.1 GAD (GENERALIZED ANXIETY DISORDER): ICD-10-CM

## 2025-01-09 RX ORDER — DIAZEPAM 5 MG/1
5 TABLET ORAL DAILY PRN
Qty: 15 TABLET | Refills: 1 | Status: SHIPPED | OUTPATIENT
Start: 2025-01-09

## 2025-01-09 RX ORDER — OXCARBAZEPINE 150 MG/1
TABLET, FILM COATED ORAL
Qty: 60 TABLET | Refills: 1 | Status: SHIPPED | OUTPATIENT
Start: 2025-01-09

## 2025-01-09 RX ORDER — LUMATEPERONE 42 MG/1
1 CAPSULE ORAL DAILY
Qty: 30 CAPSULE | Refills: 1 | Status: SHIPPED | OUTPATIENT
Start: 2025-01-09

## 2025-01-09 NOTE — PROGRESS NOTES
This provider is located at [facility], Behavioral Health, [address] [place] [address], [facility],using a secure MyChart Video Visit through [employer]. Patient is being seen remotely via telehealth at their home address in [place], and stated they are in a secure environment for this session. The patient's condition being diagnosed/treated is appropriate for telemedicine. The provider identified herself as well as her credentials.   The patient, and/or patients guardian, consent to be seen remotely, and when consent is given they understand that the consent allows for patient identifiable information to be sent to a third party as needed.   They may refuse to be seen remotely at any time. The electronic data is encrypted and password protected, and the patient and/or guardian has been advised of the potential risks to privacy not withstanding such measures.    Video Visit    Patient Name: Vicky Lowery  : 1985   MRN: [id number]   Care Team: Patient Care Team:  José Miguel Dunn DO as PCP - General (Family Medicine)  Glenn Gonzales PA-C as Referring Physician (Physician Assistant)  Malgorzata Felder APRN as Nurse Practitioner (Behavioral Health)         Chief Complaint:    Chief Complaint   Patient presents with    Bipolar    Anxiety    Med Management       History of Present Illness: Vicky Lowery is a 40 y.o. female who presents via video visit for a medication management follow up.  Patient reports that overall medication has been working well. She reports that things have been OK. She feels like for the most part. Her mood and anxiety are managed well. She does endorse some increased panic and stress at times due to some situational stressors however, she feels that she is managing them well. She denies SI/HI today.     The following portion of the patient's history were reviewed and updated appropriately: allergies, current and past medications,  family history, medical history and social history. JAYRO reviewed and appropriate.   Subjective   Review of Systems:    Review of Systems   Respiratory: Negative.     Cardiovascular: Negative.  Negative for chest pain and palpitations.   Neurological: Negative.    Psychiatric/Behavioral:  Positive for stress. The patient is nervous/anxious (panic).    All other systems reviewed and are negative.      Current Medications:   Current Outpatient Medications   Medication Sig Dispense Refill    diazePAM (VALIUM) 5 MG tablet Take 1 tablet by mouth Daily As Needed for Anxiety. 15 tablet 1    Lumateperone Tosylate (Caplyta) 42 MG capsule Take 1 capsule by mouth Daily. 30 capsule 1    OXcarbazepine (Trileptal) 150 MG tablet TAKE ONE TABLET BY MOUTH NIGHTLY FOR TWO WEEKS, THEN INCREASE TO ONE TABLET BY MOUTH TWICE A DAY. 60 tablet 1    galcanezumab-gnlm (Emgality) 120 MG/ML auto-injector pen Inject 1 mL under the skin into the appropriate area as directed Every 30 (Thirty) Days. 1 mL 5    hydroCHLOROthiazide 25 MG tablet Take 1 tablet by mouth Daily.      hydroxychloroquine (PLAQUENIL) 200 MG tablet Take 1 tablet by mouth 2 (Two) Times a Day.      predniSONE (DELTASONE) 10 MG tablet 1 tablet. For 2-5 days prn RA flare      propranolol LA (INDERAL LA) 160 MG 24 hr capsule TAKE 1 CAPSULE BY MOUTH DAILY 90 capsule 1    Semaglutide, 1 MG/DOSE, (OZEMPIC) 2 MG/1.5ML solution pen-injector Inject 2 mg under the skin into the appropriate area as directed 1 (One) Time Per Week.      tiZANidine (ZANAFLEX) 4 MG tablet Take 1 tablet by mouth Every 8 (Eight) Hours As Needed for Muscle Spasms. 20 tablet 0    vitamin D (ERGOCALCIFEROL) 1.25 MG (27219 UT) capsule capsule Take 1 capsule by mouth 1 (One) Time Per Week.       No current facility-administered medications for this visit.       Mental Status Exam:   Hygiene:    appears to be WNL   Cooperation:  Cooperative  Eye Contact:  Good  Psychomotor Behavior:   appears to be WNL   Affect:   Appropriate  Mood: normal  Speech:  Normal  Thought Process:  Goal directed and Linear  Thought Content:  Normal and Mood congruent  Suicidal:  None  Homicidal:  None  Hallucinations:  None  Delusion:  None  Memory:  Intact  Orientation:  Person, Place, Time, and Situation  Reliability:  good  Insight:  Good  Judgement:  Good  Impulse Control:  Good  Physical/Medical Issues:  Yes see chart       Objective   Vital Signs:   There were no vitals taken for this visit.         BP Readings from Last 3 Encounters:   07/08/24 132/82   04/15/24 122/88   04/17/23 124/74        Pulse Readings from Last 3 Encounters:   07/08/24 80   04/15/24 82   04/17/23 94        Lab Results:     Lab Results   Component Value Date    WBC 9.44 09/28/2022    HGB 15.5 09/28/2022    HCT 45.0 09/28/2022    MCV 94 09/28/2022     09/28/2022         Lab Results   Component Value Date    GLUCOSE 94 06/24/2022    BUN 12 07/20/2022    CREATININE 0.98 07/20/2022     07/20/2022    K 3.4 (L) 07/20/2022     07/20/2022    CALCIUM 9.5 07/20/2022    PROTEINTOT 7.1 07/20/2022    ALBUMIN 4.2 07/20/2022    ALT 20 07/20/2022    AST 17 07/20/2022    ALKPHOS 84 07/20/2022    BILITOT 0.3 07/20/2022    GLOB 2.7 06/24/2022    AGRATIO 1.6 06/24/2022    BCR 12 07/20/2022    ANIONGAP 11 07/20/2022    EGFR 88.1 06/24/2022         Lab Results   Component Value Date    CHLPL 136 03/25/2014    TRIG 47 03/25/2014    HDL 54 03/25/2014    LDL 73 03/25/2014         Lab Results   Component Value Date    HGBA1C 5.4 07/09/2019          Lab Results   Component Value Date    TSH 1.990 10/11/2019         Assessment / Plan    Diagnoses and all orders for this visit:    1. Bipolar depression (Primary)  -     Lumateperone Tosylate (Caplyta) 42 MG capsule; Take 1 capsule by mouth Daily.  Dispense: 30 capsule; Refill: 1  -     OXcarbazepine (Trileptal) 150 MG tablet; TAKE ONE TABLET BY MOUTH NIGHTLY FOR TWO WEEKS, THEN INCREASE TO ONE TABLET BY MOUTH TWICE A DAY.   Dispense: 60 tablet; Refill: 1    2. MARGIE (generalized anxiety disorder)  -     diazePAM (VALIUM) 5 MG tablet; Take 1 tablet by mouth Daily As Needed for Anxiety.  Dispense: 15 tablet; Refill: 1    Patient appears to be doing well on current medication. No issues reported and no indication for change. Will continue medication as ordered.     As part of patient's treatment plan I am prescribing a controlled substance.  The patient has been made aware of the appropriate use of such medications, including potential risk of somnolence, limited ability to drive and/or work safely, and potential for dependence and/or overdose.  It has also been made clear that these medications are for use by this patient only, without concomitant use of alcohol or other substances, unless prescribed.    Patient has completed a prescribing agreement detailing terms of continued prescribing of controlled substances, including monitoring JAYRO reports, urine drug screening, and pill counts if necessary.  Patient is aware that inappropriate use will result in cessation of prescribing such medications.    AIMS  Facial and Oral Movements  Muscles of Facial Expression: None, normal  Lips and Perioral Area: None, normal  Jaw: None, normal  Tongue: None, normal  Extremity Movements  Upper (arms, wrists, hands, fingers): None, normal  Lower (legs, knees, ankles, toes): None, normal  Trunk Movements  Neck, shoulders, hips: None, normal  Overall Severity  Severity of abnormal movements (max 4): 0  Incapacitation due to abnormal movements: None, normal  Patient's awareness of abnormal movements (rate only patient's report): Aware, no distress  Dental Status  Current problems with teeth and/or dentures?: No  Does patient usually wear dentures?: No        A psychological evaluation was conducted in order to assess past and current level of functioning. Areas assessed included, but were not limited to: perception of social support, perception of ability  to face and deal with challenges in life (positive functioning), anxiety symptoms, depressive symptoms, perspective on beliefs/belief system, coping skills for stress, intelligence level,  Therapeutic rapport was established. Interventions conducted today were geared towards incorporating medication management along with support for continued therapy. Education was also provided as to the med management with this provider and what to expect in subsequent sessions.      We discussed risks, benefits, goals and side effects of the above medication and the patient was agreeable with the plan. Patient was educated on the importance of compliance with treatment and follow-up appointments. Patient is aware to contact the [facility] with any worsening of symptoms. To call for questions or concerns and return early if necessary. Patent is agreeable to go to the Emergency Department or call 911 should they begin SI/HI.        MEDS ORDERED DURING VISIT:  New Medications Ordered This Visit   Medications    diazePAM (VALIUM) 5 MG tablet     Sig: Take 1 tablet by mouth Daily As Needed for Anxiety.     Dispense:  15 tablet     Refill:  1    Lumateperone Tosylate (Caplyta) 42 MG capsule     Sig: Take 1 capsule by mouth Daily.     Dispense:  30 capsule     Refill:  1    OXcarbazepine (Trileptal) 150 MG tablet     Sig: TAKE ONE TABLET BY MOUTH NIGHTLY FOR TWO WEEKS, THEN INCREASE TO ONE TABLET BY MOUTH TWICE A DAY.     Dispense:  60 tablet     Refill:  1         Follow Up   Return in about 6 weeks (around 2/20/2025).  Patient was given instructions and counseling regarding her condition or for health maintenance advice. Please see specific information pulled into the AVS if appropriate.     TREATMENT PLAN/GOALS: Continue supportive psychotherapy efforts and medications as indicated. Treatment and medication options discussed during today's visit. Patient acknowledged and verbally consented to continue with current treatment plan  and was educated on the importance of compliance with treatment and follow-up appointments.    MEDICATION ISSUES:  Discussed medication options and treatment plan of prescribed medication as well as the risks, benefits, and side effects including potential falls, possible impaired driving and metabolic adversities among others. Patient is agreeable to call the office with any worsening of symptoms or onset of side effects. Patient is agreeable to call 911 or go to the nearest ER should he/she begin having SI/HI.        FRANCK SoteloE PC [facility] BE[facility]AVIORAL [facility]  [address] [place] [address]  [phone]    January 17, 2025 09:45 EST

## 2025-02-20 ENCOUNTER — TELEMEDICINE (OUTPATIENT)
Dept: BEHAVIORAL HEALTH | Facility: CLINIC | Age: 40
End: 2025-02-20
Payer: MEDICARE

## 2025-02-20 DIAGNOSIS — F31.9 BIPOLAR DEPRESSION: Primary | ICD-10-CM

## 2025-02-20 DIAGNOSIS — F41.1 GAD (GENERALIZED ANXIETY DISORDER): ICD-10-CM

## 2025-02-20 RX ORDER — OXCARBAZEPINE 150 MG/1
TABLET, FILM COATED ORAL
Qty: 60 TABLET | Refills: 2 | Status: SHIPPED | OUTPATIENT
Start: 2025-02-20

## 2025-02-20 RX ORDER — DIAZEPAM 5 MG/1
5 TABLET ORAL DAILY PRN
Qty: 15 TABLET | Refills: 2 | Status: SHIPPED | OUTPATIENT
Start: 2025-02-20

## 2025-02-20 RX ORDER — LUMATEPERONE 42 MG/1
1 CAPSULE ORAL DAILY
Qty: 30 CAPSULE | Refills: 2 | Status: SHIPPED | OUTPATIENT
Start: 2025-02-20

## 2025-02-20 NOTE — PROGRESS NOTES
This provider is located at [facility], Behavioral Health, [address] [place] [address], [facility],using a secure MyChart Video Visit through [employer]. Patient is being seen remotely via telehealth at their home address in [place], and stated they are in a secure environment for this session. The patient's condition being diagnosed/treated is appropriate for telemedicine. The provider identified herself as well as her credentials.   The patient, and/or patients guardian, consent to be seen remotely, and when consent is given they understand that the consent allows for patient identifiable information to be sent to a third party as needed.   They may refuse to be seen remotely at any time. The electronic data is encrypted and password protected, and the patient and/or guardian has been advised of the potential risks to privacy not withstanding such measures.    Video Visit    Patient Name: Vicky Lowery  : 1985   MRN: [id number]   Care Team: Patient Care Team:  José Miguel Dunn DO as PCP - General (Family Medicine)  Glenn Gonzales PA-C as Referring Physician (Physician Assistant)  Malgorzata Felder APRN as Nurse Practitioner (Behavioral Health)         Chief Complaint:    Chief Complaint   Patient presents with    Bipolar    Anxiety    Med Management       History of Present Illness: Vicky Lowery is a 40 y.o. female who presents via video visit for a medication management follow up.  Patient reports that overall medication continues to be effective.  She feels that her mood and anxiety have been relatively stable.  She finds herself being less irritable and is able to control her emotions much better. She did not see the need to make any changes and did not have any medication concerns at today's visit. She denies SI/HI today.     The following portion of the patient's history were reviewed and updated appropriately: allergies, current and past  medications, family history, medical history and social history. JAYRO reviewed and appropriate.   Subjective   Review of Systems:    Review of Systems   Respiratory: Negative.     Cardiovascular: Negative.  Negative for chest pain and palpitations.   Neurological: Negative.    Psychiatric/Behavioral:  Positive for stress. The patient is nervous/anxious.    All other systems reviewed and are negative.      Current Medications:   Current Outpatient Medications   Medication Sig Dispense Refill    diazePAM (VALIUM) 5 MG tablet Take 1 tablet by mouth Daily As Needed for Anxiety. 15 tablet 2    Lumateperone Tosylate (Caplyta) 42 MG capsule Take 1 capsule by mouth Daily. 30 capsule 2    OXcarbazepine (Trileptal) 150 MG tablet TAKE ONE TABLET BY MOUTH NIGHTLY FOR TWO WEEKS, THEN INCREASE TO ONE TABLET BY MOUTH TWICE A DAY. 60 tablet 2    galcanezumab-gnlm (Emgality) 120 MG/ML auto-injector pen Inject 1 mL under the skin into the appropriate area as directed Every 30 (Thirty) Days. 1 mL 5    hydroCHLOROthiazide 25 MG tablet Take 1 tablet by mouth Daily.      hydroxychloroquine (PLAQUENIL) 200 MG tablet Take 1 tablet by mouth 2 (Two) Times a Day.      predniSONE (DELTASONE) 10 MG tablet 1 tablet. For 2-5 days prn RA flare      propranolol LA (INDERAL LA) 160 MG 24 hr capsule TAKE 1 CAPSULE BY MOUTH DAILY 90 capsule 1    Semaglutide, 1 MG/DOSE, (OZEMPIC) 2 MG/1.5ML solution pen-injector Inject 2 mg under the skin into the appropriate area as directed 1 (One) Time Per Week.       No current facility-administered medications for this visit.       Mental Status Exam:   Hygiene:    appears to be WNL   Cooperation:  Cooperative  Eye Contact:  Good  Psychomotor Behavior:   appears to be WNL   Affect:  Appropriate  Mood: normal  Speech:  Normal  Thought Process:  Goal directed and Linear  Thought Content:  Normal and Mood congruent  Suicidal:  None  Homicidal:  None  Hallucinations:  None  Delusion:  None  Memory:   Intact  Orientation:  Person, Place, Time, and Situation  Reliability:  good  Insight:  Good  Judgement:  Good  Impulse Control:  Good  Physical/Medical Issues:  Yes see chart       Objective   Vital Signs:   There were no vitals taken for this visit.         BP Readings from Last 3 Encounters:   07/08/24 132/82   04/15/24 122/88   04/17/23 124/74        Pulse Readings from Last 3 Encounters:   07/08/24 80   04/15/24 82   04/17/23 94        Lab Results:     Lab Results   Component Value Date    WBC 9.44 09/28/2022    HGB 15.5 09/28/2022    HCT 45.0 09/28/2022    MCV 94 09/28/2022     09/28/2022         Lab Results   Component Value Date    GLUCOSE 94 06/24/2022    BUN 12 07/20/2022    CREATININE 0.98 07/20/2022     07/20/2022    K 3.4 (L) 07/20/2022     07/20/2022    CALCIUM 9.5 07/20/2022    PROTEINTOT 7.1 07/20/2022    ALBUMIN 4.2 07/20/2022    ALT 20 07/20/2022    AST 17 07/20/2022    ALKPHOS 84 07/20/2022    BILITOT 0.3 07/20/2022    GLOB 2.7 06/24/2022    AGRATIO 1.6 06/24/2022    BCR 12 07/20/2022    ANIONGAP 11 07/20/2022    EGFR 88.1 06/24/2022         Lab Results   Component Value Date    CHLPL 136 03/25/2014    TRIG 47 03/25/2014    HDL 54 03/25/2014    LDL 73 03/25/2014         Lab Results   Component Value Date    HGBA1C 5.4 07/09/2019          Lab Results   Component Value Date    TSH 1.990 10/11/2019         Assessment / Plan    Diagnoses and all orders for this visit:    1. Bipolar depression (Primary)  -     OXcarbazepine (Trileptal) 150 MG tablet; TAKE ONE TABLET BY MOUTH NIGHTLY FOR TWO WEEKS, THEN INCREASE TO ONE TABLET BY MOUTH TWICE A DAY.  Dispense: 60 tablet; Refill: 2  -     Lumateperone Tosylate (Caplyta) 42 MG capsule; Take 1 capsule by mouth Daily.  Dispense: 30 capsule; Refill: 2    2. MARGIE (generalized anxiety disorder)  -     diazePAM (VALIUM) 5 MG tablet; Take 1 tablet by mouth Daily As Needed for Anxiety.  Dispense: 15 tablet; Refill: 2    Patient appears to be doing  well on current medication. No issues reported and no indication for change. Will continue medication as ordered.     As part of patient's treatment plan I am prescribing a controlled substance.  The patient has been made aware of the appropriate use of such medications, including potential risk of somnolence, limited ability to drive and/or work safely, and potential for dependence and/or overdose.  It has also been made clear that these medications are for use by this patient only, without concomitant use of alcohol or other substances, unless prescribed.    Patient has completed a prescribing agreement detailing terms of continued prescribing of controlled substances, including monitoring JAYRO reports, urine drug screening, and pill counts if necessary.  Patient is aware that inappropriate use will result in cessation of prescribing such medications.    At this time, patient is being prescribed an antipsychotic medication.  The risks, benefits and potential side effects of these medications have been reviewed with the patient/guardian.  I have also discussed with the patient/guardian that while on these medications the following labs should be drawn yearly.  Those labs include, a CBC, a CMP, a lipid panel, A1c, and Thyroid labs.  Encouraged patient/guardian to discuss these labs with their primary care provider.      AIMS  Facial and Oral Movements  Muscles of Facial Expression: None, normal  Lips and Perioral Area: None, normal  Jaw: None, normal  Tongue: None, normal  Extremity Movements  Upper (arms, wrists, hands, fingers): None, normal  Lower (legs, knees, ankles, toes): None, normal  Trunk Movements  Neck, shoulders, hips: None, normal  Overall Severity  Severity of abnormal movements (max 4): 0  Incapacitation due to abnormal movements: None, normal  Patient's awareness of abnormal movements (rate only patient's report): Aware, no distress  Dental Status  Current problems with teeth and/or dentures?:  No  Does patient usually wear dentures?: No    A psychological evaluation was conducted in order to assess past and current level of functioning. Areas assessed included, but were not limited to: perception of social support, perception of ability to face and deal with challenges in life (positive functioning), anxiety symptoms, depressive symptoms, perspective on beliefs/belief system, coping skills for stress, intelligence level,  Therapeutic rapport was established. Interventions conducted today were geared towards incorporating medication management along with support for continued therapy. Education was also provided as to the med management with this provider and what to expect in subsequent sessions.      We discussed risks, benefits, goals and side effects of the above medication and the patient was agreeable with the plan. Patient was educated on the importance of compliance with treatment and follow-up appointments. Patient is aware to contact the [facility] with any worsening of symptoms. To call for questions or concerns and return early if necessary. Patent is agreeable to go to the Emergency Department or call 911 should they begin SI/HI.        MEDS ORDERED DURING VISIT:  New Medications Ordered This Visit   Medications    diazePAM (VALIUM) 5 MG tablet     Sig: Take 1 tablet by mouth Daily As Needed for Anxiety.     Dispense:  15 tablet     Refill:  2    OXcarbazepine (Trileptal) 150 MG tablet     Sig: TAKE ONE TABLET BY MOUTH NIGHTLY FOR TWO WEEKS, THEN INCREASE TO ONE TABLET BY MOUTH TWICE A DAY.     Dispense:  60 tablet     Refill:  2    Lumateperone Tosylate (Caplyta) 42 MG capsule     Sig: Take 1 capsule by mouth Daily.     Dispense:  30 capsule     Refill:  2         Follow Up   Return in about 3 months (around 5/20/2025).  Patient was given instructions and counseling regarding her condition or for health maintenance advice. Please see specific information pulled into the AVS if  appropriate.     TREATMENT PLAN/GOALS: Continue supportive psychotherapy efforts and medications as indicated. Treatment and medication options discussed during today's visit. Patient acknowledged and verbally consented to continue with current treatment plan and was educated on the importance of compliance with treatment and follow-up appointments.    MEDICATION ISSUES:  Discussed medication options and treatment plan of prescribed medication as well as the risks, benefits, and side effects including potential falls, possible impaired driving and metabolic adversities among others. Patient is agreeable to call the office with any worsening of symptoms or onset of side effects. Patient is agreeable to call 911 or go to the nearest ER should he/she begin having SI/HI.        FRANCK Sotelo PC [facility] BE[facility]AV[facility]AL [facility]  [address] [place] [address] [phone]    February 25, 2025 13:00 EST

## 2025-04-28 ENCOUNTER — HOSPITAL ENCOUNTER (EMERGENCY)
Facility: HOSPITAL | Age: 40
Discharge: HOME | End: 2025-04-28
Attending: STUDENT IN AN ORGANIZED HEALTH CARE EDUCATION/TRAINING PROGRAM | Admitting: STUDENT IN AN ORGANIZED HEALTH CARE EDUCATION/TRAINING PROGRAM
Payer: MEDICARE

## 2025-04-28 ENCOUNTER — APPOINTMENT (OUTPATIENT)
Dept: GENERAL RADIOLOGY | Facility: HOSPITAL | Age: 40
End: 2025-04-28
Payer: MEDICARE

## 2025-04-28 VITALS
SYSTOLIC BLOOD PRESSURE: 133 MMHG | RESPIRATION RATE: 18 BRPM | BODY MASS INDEX: 48.82 KG/M2 | WEIGHT: 293 LBS | DIASTOLIC BLOOD PRESSURE: 100 MMHG | TEMPERATURE: 99.3 F | HEART RATE: 77 BPM | HEIGHT: 65 IN | OXYGEN SATURATION: 95 %
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
## 2025-04-28 DIAGNOSIS — S23.3XXA SPRAIN OF UPPER BACK, INITIAL ENCOUNTER: ICD-10-CM

## 2025-04-28 DIAGNOSIS — S40.011A CONTUSION OF RIGHT SHOULDER, INITIAL ENCOUNTER: Primary | ICD-10-CM

## 2025-04-28 PROCEDURE — 73030 X-RAY EXAM OF SHOULDER: CPT

## 2025-04-28 PROCEDURE — 99283 EMERGENCY DEPT VISIT LOW MDM: CPT

## 2025-04-28 PROCEDURE — 72072 X-RAY EXAM THORAC SPINE 3VWS: CPT

## 2025-04-28 RX ORDER — ACETAMINOPHEN 500 MG
1000 TABLET ORAL ONCE
Status: COMPLETED | OUTPATIENT
Start: 2025-04-28 | End: 2025-04-28

## 2025-04-28 RX ADMIN — ACETAMINOPHEN 1000 MG: 500 TABLET ORAL at 13:42

## 2025-04-28 NOTE — ED PROVIDER NOTES
Subjective   History of Present Illness  Pt is a 41 yo female presenting to ED by EMS with complaints of pain after a fall. PMHx significant for CBP, Fibromyalgia, Depression, Anxiety, Migraines, MRSA, Neuropathy, PTSD and Schizoaffective disorder. Pt explains just PTA she tripped in bathtub and fell onto her right side. Pt complains of pain in right shoulder and mid back. She denies hitting her head or LOC. She denies dizziness, CP or SOB. Pt has been able to ambulate without difficulty. She does not take blood thinners. She denies taking meds PTA.     History provided by:  Patient, medical records and EMS personnel      Review of Systems   HENT:  Negative for facial swelling.    Eyes:  Negative for visual disturbance.   Respiratory:  Negative for shortness of breath.    Cardiovascular:  Negative for chest pain.   Gastrointestinal:  Negative for abdominal pain.   Musculoskeletal:  Positive for arthralgias and back pain. Negative for neck pain.   Skin:  Negative for wound.   Neurological:  Negative for dizziness, weakness, numbness and headaches.   Psychiatric/Behavioral:  Negative for confusion.        Past Medical History:   Diagnosis Date    Anxiety     Anxiety     Arthritis     Asthma     Back pain     Bipolar disorder     Cervical disc disorder     Chronic pain disorder     A very long time    Depression     Fibromyalgia, primary     Kidney disease, chronic, stage II (mild, EGFR 60+ ml/min)     Low back pain     Migraine     Mitral valve prolapse     MRSA (methicillin resistant Staphylococcus aureus)     Peripheral neuropathy     Psychiatric illness 2010?    Not sure what's meant by that    PTSD (post-traumatic stress disorder) 2017    Schizoaffective disorder 2014?    Self-injurious behavior 2010    Cutting    Tattoo        Allergies   Allergen Reactions    Triptans Swelling     Felt like throat was closing up    Nsaids Other (See Comments)     Developed renal insufficiency after long term use       Past  Surgical History:   Procedure Laterality Date    EPIDURAL BLOCK      FIBULA FRACTURE SURGERY  2014, 2015    FRACTURE SURGERY  2014    MANDIBLE FRACTURE SURGERY  2002    TRIGGER POINT INJECTION      WISDOM TOOTH EXTRACTION         Family History   Problem Relation Age of Onset    Breast cancer Mother     Arthritis Mother     Cancer Mother     Diabetes Maternal Grandfather     Hypertension Father     Migraines Father     Arthritis Maternal Grandmother     Colon cancer Neg Hx        Social History     Socioeconomic History    Marital status:    Tobacco Use    Smoking status: Every Day     Current packs/day: 0.50     Average packs/day: 0.5 packs/day for 3.0 years (1.5 ttl pk-yrs)     Types: Cigarettes     Passive exposure: Past    Smokeless tobacco: Never   Vaping Use    Vaping status: Never Used   Substance and Sexual Activity    Alcohol use: Yes     Alcohol/week: 1.0 standard drink of alcohol     Types: 1 Drinks containing 0.5 oz of alcohol per week     Comment: Honestly not even that often    Drug use: No    Sexual activity: Yes     Partners: Male     Birth control/protection: None           Objective   Physical Exam  Vitals and nursing note reviewed.   Constitutional:       General: She is not in acute distress.     Appearance: She is obese.   HENT:      Head: Atraumatic.   Eyes:      Conjunctiva/sclera: Conjunctivae normal.   Cardiovascular:      Rate and Rhythm: Normal rate.   Pulmonary:      Effort: Pulmonary effort is normal. No respiratory distress.   Musculoskeletal:         General: Normal range of motion.      Right shoulder: Tenderness present. No deformity. Normal range of motion.      Right elbow: Normal range of motion. No tenderness.      Right wrist: No tenderness. Normal range of motion.      Cervical back: Normal range of motion and neck supple. No tenderness. Normal range of motion.      Thoracic back: Tenderness present. No deformity. Normal range of motion.      Lumbar back: No tenderness.  "Normal range of motion.   Skin:     General: Skin is warm.   Neurological:      General: No focal deficit present.      Mental Status: She is alert and oriented to person, place, and time.      Sensory: No sensory deficit.      Motor: No weakness.   Psychiatric:         Mood and Affect: Mood normal.         Behavior: Behavior normal.         Procedures           ED Course  ED Course as of 04/28/25 1838   Mon Apr 28, 2025   1448 I personally and independently reviewed right shoulder and T spine and agree with the radiology interpretation specifically no acute fractures.  [RT]   1502 Updated patient on results and tx plan.  [RT]      ED Course User Index  [RT] Alia Santos PA        No results found for this or any previous visit (from the past 24 hours).  Note: In addition to lab results from this visit, the labs listed above may include labs taken at another facility or during a different encounter within the last 24 hours. Please correlate lab times with ED admission and discharge times for further clarification of the services performed during this visit.    XR Spine Thoracic 3 View   Final Result   Impression:   Mild mid thoracic spondylosis without evidence of fracture or malalignment.            Electronically Signed: Keith Medina MD     4/28/2025 2:34 PM EDT     Workstation ID: [id number]      XR Shoulder 2+ View Right   Final Result   Impression:   Minimal acromioclavicular degenerative arthrosis.            Electronically Signed: Keith Medina MD     4/28/2025 2:35 PM EDT     Workstation ID: [id number]        Vitals:    04/28/25 1315   BP: 133/100   Patient Position: Sitting   Pulse: 77   Resp: 18   Temp: 99.3 °F (37.4 °C)   TempSrc: Oral   SpO2: 95%   Weight: (!) 147 kg (325 lb)   Height: 165.1 cm (65\")     Medications   acetaminophen (TYLENOL) tablet 1,000 mg (1,000 mg Oral Given 4/28/25 1342)     ECG/EMG Results (last 24 hours)       ** No results found for the last 24 hours. **          No orders to " display                                                      Medical Decision Making  Pt is a 41 yo female presenting to ED with complaints of pain after a fall.I had a discussion with the patient / family regarding ED course, diagnosis, diagnostic results and treatment plan including medications and admission / discharge. Discussed if new or worse symptoms / concerns to return to ED for further evaluation. Discussed need for close follow up with PCP / specialists.    DDx  Fracture, AC separation, Sprain, Spinal injury, Contusion    Problems Addressed:  Contusion of right shoulder, initial encounter: complicated acute illness or injury  Fall in bathtub, initial encounter: complicated acute illness or injury  Sprain of upper back, initial encounter: complicated acute illness or injury    Amount and/or Complexity of Data Reviewed  External Data Reviewed: notes.     Details: Reviewed previous non ED visits including prior labs, imaging, available notes, medications, allergies and surgical hx.   2/20/25 Behavioral health f/u anxiety  Radiology: ordered and independent interpretation performed. Decision-making details documented in ED Course.    Risk  OTC drugs.        Final diagnoses:   Contusion of right shoulder, initial encounter   Sprain of upper back, initial encounter   Fall in bathtub, initial encounter       ED Disposition  ED Disposition       ED Disposition   Discharge    Condition   Stable    Comment   --               José Miguel Dunn DO  [address]   [place] [id number] [phone]    Schedule an appointment as soon as possible for a visit       [facility] EMERGENCY DEPARTMENT  [address] [facility] [address] [phone]    If symptoms worsen         Medication List      No changes were made to your prescriptions during this visit.            Alia Santos PA  04/28/25 0749

## 2025-05-09 ENCOUNTER — TELEPHONE (OUTPATIENT)
Dept: NEUROLOGY | Facility: CLINIC | Age: 40
End: 2025-05-09

## 2025-05-22 ENCOUNTER — TELEMEDICINE (OUTPATIENT)
Dept: BEHAVIORAL HEALTH | Facility: CLINIC | Age: 40
End: 2025-05-22
Payer: MEDICARE

## 2025-05-22 DIAGNOSIS — F31.9 BIPOLAR DEPRESSION: Primary | ICD-10-CM

## 2025-05-22 DIAGNOSIS — F41.1 GAD (GENERALIZED ANXIETY DISORDER): ICD-10-CM

## 2025-05-22 PROCEDURE — 1159F MED LIST DOCD IN RCRD: CPT

## 2025-05-22 PROCEDURE — 1160F RVW MEDS BY RX/DR IN RCRD: CPT

## 2025-05-22 PROCEDURE — 99214 OFFICE O/P EST MOD 30 MIN: CPT

## 2025-05-22 RX ORDER — OXCARBAZEPINE 150 MG/1
150 TABLET, FILM COATED ORAL 2 TIMES DAILY
Qty: 60 TABLET | Refills: 2 | Status: SHIPPED | OUTPATIENT
Start: 2025-05-22

## 2025-05-22 RX ORDER — LUMATEPERONE 42 MG/1
1 CAPSULE ORAL DAILY
Qty: 30 CAPSULE | Refills: 2 | Status: SHIPPED | OUTPATIENT
Start: 2025-05-22

## 2025-05-22 RX ORDER — DIAZEPAM 5 MG/1
5 TABLET ORAL DAILY PRN
Qty: 15 TABLET | Refills: 2 | Status: SHIPPED | OUTPATIENT
Start: 2025-05-22

## 2025-05-22 NOTE — PROGRESS NOTES
This provider is located at her home office in [place], [place] using a secure [employer] Video Visit through [employer]. Patient is being seen remotely via telehealth at their home address in [place], and stated they are in a secure environment for this session. The patient's condition being diagnosed/treated is appropriate for telemedicine. The provider identified herself as well as her credentials.   The patient, and/or patients guardian, consent to be seen remotely, and when consent is given they understand that the consent allows for patient identifiable information to be sent to a third party as needed.   They may refuse to be seen remotely at any time. The electronic data is encrypted and password protected, and the patient and/or guardian has been advised of the potential risks to privacy not withstanding such measures.    Video Visit    Patient Name: Vicky Lowery  : 1985   MRN: [id number]   Care Team: Patient Care Team:  José Miguel Dunn DO as PCP - General (Family Medicine)  Glenn Gonzales PA-C as Referring Physician (Physician Assistant)  Malgorzata Felder APRN as Nurse Practitioner (Behavioral Health)         Chief Complaint:    Chief Complaint   Patient presents with    Bipolar    Anxiety    Med Management       History of Present Illness: Vicky Lowery is a 40 y.o. female who presents via video visit for a medication management follow up.  Patient reports that overall medication continues to be effective.  She feels that her mood and anxiety have been managed well.  She does endorse some situational stressors however, she feels that she is managing them all relatively well. She did not see the need to make any changes and did not have any medication concerns at today's visit. She denies SI/HI today.     The following portion of the patient's history were reviewed and updated appropriately: allergies, current and past medications, family history, medical history  and social history. JAYRO reviewed and appropriate.   Subjective   Review of Systems:    Review of Systems   Respiratory: Negative.     Cardiovascular: Negative.  Negative for chest pain and palpitations.   Neurological: Negative.    Psychiatric/Behavioral:  Positive for stress. The patient is nervous/anxious.    All other systems reviewed and are negative.      Current Medications:   Current Outpatient Medications   Medication Sig Dispense Refill    diazePAM (VALIUM) 5 MG tablet Take 1 tablet by mouth Daily As Needed for Anxiety. 15 tablet 2    Lumateperone Tosylate (Caplyta) 42 MG capsule Take 1 capsule by mouth Daily. 30 capsule 2    OXcarbazepine (Trileptal) 150 MG tablet Take 1 tablet by mouth 2 (Two) Times a Day. 60 tablet 2    galcanezumab-gnlm (Emgality) 120 MG/ML auto-injector pen Inject 1 mL under the skin into the appropriate area as directed Every 30 (Thirty) Days. 1 mL 5    hydroCHLOROthiazide 25 MG tablet Take 1 tablet by mouth Daily.      hydroxychloroquine (PLAQUENIL) 200 MG tablet Take 1 tablet by mouth 2 (Two) Times a Day.      predniSONE (DELTASONE) 10 MG tablet 1 tablet. For 2-5 days prn RA flare      propranolol LA (INDERAL LA) 160 MG 24 hr capsule TAKE 1 CAPSULE BY MOUTH DAILY 90 capsule 1    Semaglutide, 1 MG/DOSE, (OZEMPIC) 2 MG/1.5ML solution pen-injector Inject 2 mg under the skin into the appropriate area as directed 1 (One) Time Per Week.       No current facility-administered medications for this visit.       Mental Status Exam:   Hygiene:    appears to be WNL   Cooperation:  Cooperative  Eye Contact:  Good  Psychomotor Behavior:   appears to be WNL   Affect:  Appropriate  Mood: normal  Speech:  Normal  Thought Process:  Goal directed and Linear  Thought Content:  Normal and Mood congruent  Suicidal:  None  Homicidal:  None  Hallucinations:  None  Delusion:  None  Memory:  Intact  Orientation:  Person, Place, Time, and Situation  Reliability:  good  Insight:  Good  Judgement:   Good  Impulse Control:  Good  Physical/Medical Issues:  Yes see chart       Objective   Vital Signs:   There were no vitals taken for this visit.         BP Readings from Last 3 Encounters:   04/28/25 133/100   07/08/24 132/82   04/15/24 122/88        Pulse Readings from Last 3 Encounters:   04/28/25 77   07/08/24 80   04/15/24 82        Lab Results:     Lab Results   Component Value Date    WBC 9.44 09/28/2022    HGB 15.5 09/28/2022    HCT 45.0 09/28/2022    MCV 94 09/28/2022     09/28/2022         Lab Results   Component Value Date    GLUCOSE 94 06/24/2022    BUN 12 07/20/2022    CREATININE 0.98 07/20/2022     07/20/2022    K 3.4 (L) 07/20/2022     07/20/2022    CALCIUM 9.5 07/20/2022    PROTEINTOT 7.1 07/20/2022    ALBUMIN 4.2 07/20/2022    ALT 20 07/20/2022    AST 17 07/20/2022    ALKPHOS 84 07/20/2022    BILITOT 0.3 07/20/2022    GLOB 2.7 06/24/2022    AGRATIO 1.6 06/24/2022    BCR 12 07/20/2022    ANIONGAP 11 07/20/2022    EGFR 88.1 06/24/2022         Lab Results   Component Value Date    CHLPL 136 03/25/2014    TRIG 47 03/25/2014    HDL 54 03/25/2014    LDL 73 03/25/2014         Lab Results   Component Value Date    HGBA1C 5.4 07/09/2019          Lab Results   Component Value Date    TSH 1.990 10/11/2019         Assessment / Plan    Diagnoses and all orders for this visit:    1. Bipolar depression (Primary)  -     Lumateperone Tosylate (Caplyta) 42 MG capsule; Take 1 capsule by mouth Daily.  Dispense: 30 capsule; Refill: 2  -     OXcarbazepine (Trileptal) 150 MG tablet; Take 1 tablet by mouth 2 (Two) Times a Day.  Dispense: 60 tablet; Refill: 2    2. MARGIE (generalized anxiety disorder)  -     diazePAM (VALIUM) 5 MG tablet; Take 1 tablet by mouth Daily As Needed for Anxiety.  Dispense: 15 tablet; Refill: 2      Patient appears to be doing well on current medication. No issues reported and no indication for change. Will continue medication as ordered.     As part of patient's treatment plan I  am prescribing a controlled substance.  The patient has been made aware of the appropriate use of such medications, including potential risk of somnolence, limited ability to drive and/or work safely, and potential for dependence and/or overdose.  It has also been made clear that these medications are for use by this patient only, without concomitant use of alcohol or other substances, unless prescribed.    Patient/guardian has completed a prescribing agreement detailing terms of continued prescribing of controlled substances, including monitoring JAYRO reports, urine drug screening, and pill counts if necessary.  Patient is aware that inappropriate use will result in cessation of prescribing such medications.    At this time, patient is being prescribed a mood stabilizer and an antipsychotic medication.  The risks, benefits and potential side effects of these medications have been reviewed with the patient/guardian.  I have also discussed with the patient/guardian that while on these medications the following labs should be drawn yearly.  Those labs include, a CBC, a CMP, a lipid panel, A1c, and Thyroid labs.  Encouraged patient/guardian to discuss these labs with their primary care provider.      AIMS  Facial and Oral Movements  Muscles of Facial Expression: None, normal  Lips and Perioral Area: None, normal  Jaw: None, normal  Tongue: None, normal  Extremity Movements  Upper (arms, wrists, hands, fingers): None, normal  Lower (legs, knees, ankles, toes): None, normal  Trunk Movements  Neck, shoulders, hips: None, normal  Overall Severity  Severity of abnormal movements (max 4): 0  Incapacitation due to abnormal movements: None, normal  Patient's awareness of abnormal movements (rate only patient's report): Aware, no distress  Dental Status  Current problems with teeth and/or dentures?: No  Does patient usually wear dentures?: No      A psychological evaluation was conducted in order to assess past and current  level of functioning. Areas assessed included, but were not limited to: perception of social support, perception of ability to face and deal with challenges in life (positive functioning), anxiety symptoms, depressive symptoms, perspective on beliefs/belief system, coping skills for stress, intelligence level,  Therapeutic rapport was established. Interventions conducted today were geared towards incorporating medication management along with support for continued therapy. Education was also provided as to the med management with this provider and what to expect in subsequent sessions.      We discussed risks, benefits, goals and side effects of the above medication and the patient was agreeable with the plan. Patient was educated on the importance of compliance with treatment and follow-up appointments. Patient is aware to contact the [facility] with any worsening of symptoms. To call for questions or concerns and return early if necessary. Patent is agreeable to go to the Emergency Department or call 911 should they begin SI/HI.        MEDS ORDERED DURING VISIT:  New Medications Ordered This Visit   Medications    diazePAM (VALIUM) 5 MG tablet     Sig: Take 1 tablet by mouth Daily As Needed for Anxiety.     Dispense:  15 tablet     Refill:  2    Lumateperone Tosylate (Caplyta) 42 MG capsule     Sig: Take 1 capsule by mouth Daily.     Dispense:  30 capsule     Refill:  2    OXcarbazepine (Trileptal) 150 MG tablet     Sig: Take 1 tablet by mouth 2 (Two) Times a Day.     Dispense:  60 tablet     Refill:  2         Follow Up   Return in about 3 months (around 8/22/2025).  Patient was given instructions and counseling regarding her condition or for health maintenance advice. Please see specific information pulled into the AVS if appropriate.     TREATMENT PLAN/GOALS: Continue supportive psychotherapy efforts and medications as indicated. Treatment and medication options discussed during today's visit. Patient  acknowledged and verbally consented to continue with current treatment plan and was educated on the importance of compliance with treatment and follow-up appointments.    MEDICATION ISSUES:  Discussed medication options and treatment plan of prescribed medication as well as the risks, benefits, and side effects including potential falls, possible impaired driving and metabolic adversities among others. Patient is agreeable to call the office with any worsening of symptoms or onset of side effects. Patient is agreeable to call 911 or go to the nearest ER should he/she begin having SI/HI.        FRANCK Sotelo [facility]  [address] [place] [address] DR ANNELIESE SALAS [id number] [phone]    May 22, 2025 11:21 EDT

## 2025-05-23 RX ORDER — GALCANEZUMAB 120 MG/ML
INJECTION, SOLUTION SUBCUTANEOUS
Qty: 1 ML | Refills: 4 | Status: SHIPPED | OUTPATIENT
Start: 2025-05-23

## 2025-07-07 DIAGNOSIS — G43.719 INTRACTABLE CHRONIC MIGRAINE WITHOUT AURA AND WITHOUT STATUS MIGRAINOSUS: ICD-10-CM

## 2025-07-08 RX ORDER — PROPRANOLOL HYDROCHLORIDE 160 MG/1
160 CAPSULE, EXTENDED RELEASE ORAL DAILY
Qty: 90 CAPSULE | Refills: 1 | Status: SHIPPED | OUTPATIENT
Start: 2025-07-08

## 2025-07-08 NOTE — TELEPHONE ENCOUNTER
Rx Refill Note  Requested Prescriptions     Pending Prescriptions Disp Refills    propranolol LA (INDERAL LA) 160 MG 24 hr capsule [Pharmacy Med Name: PROPRANOLOL  MG CAPSULE] 90 capsule 1     Sig: TAKE 1 CAPSULE BY MOUTH DAILY      Last filled:1/7/25 90ds 1 ref  Last office visit with prescribing clinician: 4/15/2024      Next office visit with prescribing clinician: 9/8/2025     Kerry Gutierrez MA  07/08/25, 16:04 EDT    Sent to pharmacy-must schedule follow up for additional refills.

## 2025-07-16 DIAGNOSIS — F31.9 BIPOLAR DEPRESSION: ICD-10-CM

## 2025-07-16 RX ORDER — OXCARBAZEPINE 150 MG/1
TABLET, FILM COATED ORAL
Qty: 60 TABLET | Refills: 2 | Status: SHIPPED | OUTPATIENT
Start: 2025-07-16

## 2025-08-26 DIAGNOSIS — F31.9 BIPOLAR DEPRESSION: ICD-10-CM

## 2025-08-26 RX ORDER — LUMATEPERONE 42 MG/1
1 CAPSULE ORAL DAILY
Qty: 30 CAPSULE | Refills: 2 | Status: SHIPPED | OUTPATIENT
Start: 2025-08-26

## 2025-08-26 RX ORDER — OXCARBAZEPINE 150 MG/1
150 TABLET, FILM COATED ORAL 2 TIMES DAILY
Qty: 60 TABLET | Refills: 2 | Status: SHIPPED | OUTPATIENT
Start: 2025-08-26